# Patient Record
Sex: FEMALE | Race: BLACK OR AFRICAN AMERICAN | NOT HISPANIC OR LATINO | Employment: UNEMPLOYED | URBAN - METROPOLITAN AREA
[De-identification: names, ages, dates, MRNs, and addresses within clinical notes are randomized per-mention and may not be internally consistent; named-entity substitution may affect disease eponyms.]

---

## 2022-04-21 ENCOUNTER — APPOINTMENT (OUTPATIENT)
Dept: ULTRASOUND IMAGING | Facility: CLINIC | Age: 32
End: 2022-04-21

## 2022-04-21 ENCOUNTER — HOSPITAL ENCOUNTER (INPATIENT)
Facility: CLINIC | Age: 32
LOS: 5 days | Discharge: HOME | End: 2022-04-26
Attending: EMERGENCY MEDICINE | Admitting: OBSTETRICS & GYNECOLOGY

## 2022-04-21 DIAGNOSIS — O14.92 PRE-ECLAMPSIA IN SECOND TRIMESTER: Primary | ICD-10-CM

## 2022-04-21 DIAGNOSIS — O36.4XX0 FETAL DEMISE IN SINGLETON PREGNANCY GREATER THAN 22 WEEKS GESTATION, ANTEPARTUM: ICD-10-CM

## 2022-04-21 LAB
ABO/RH(D): NORMAL
ALT SERPL W P-5'-P-CCNC: 13 U/L (ref 0–50)
ANION GAP SERPL CALCULATED.3IONS-SCNC: 11 MMOL/L (ref 3–14)
ANTIBODY SCREEN: NEGATIVE
APTT PPP: 31 SECONDS (ref 22–38)
AST SERPL W P-5'-P-CCNC: 20 U/L (ref 0–45)
BASOPHILS # BLD AUTO: 0 10E3/UL (ref 0–0.2)
BASOPHILS NFR BLD AUTO: 0 %
BUN SERPL-MCNC: 14 MG/DL (ref 7–30)
CA-I BLD-MCNC: 4.8 MG/DL (ref 4.4–5.2)
CALCIUM SERPL-MCNC: 8.2 MG/DL (ref 8.5–10.1)
CHLORIDE BLD-SCNC: 110 MMOL/L (ref 94–109)
CO2 SERPL-SCNC: 17 MMOL/L (ref 20–32)
CPB POCT: NO
CREAT SERPL-MCNC: 0.89 MG/DL (ref 0.52–1.04)
EOSINOPHIL # BLD AUTO: 0 10E3/UL (ref 0–0.7)
EOSINOPHIL NFR BLD AUTO: 0 %
ERYTHROCYTE [DISTWIDTH] IN BLOOD BY AUTOMATED COUNT: 15.9 % (ref 10–15)
GFR SERPL CREATININE-BSD FRML MDRD: 89 ML/MIN/1.73M2
GLUCOSE BLD-MCNC: 110 MG/DL (ref 70–99)
GLUCOSE BLD-MCNC: 141 MG/DL (ref 70–99)
HBA1C MFR BLD: 4.7 % (ref 0–5.6)
HCO3 BLDV-SCNC: 18 MMOL/L (ref 21–28)
HCT VFR BLD AUTO: 30.1 % (ref 35–47)
HCT VFR BLD CALC: 30 % (ref 35–47)
HGB BLD-MCNC: 10.2 G/DL (ref 11.7–15.7)
HGB BLD-MCNC: 9.7 G/DL (ref 11.7–15.7)
HOLD SPECIMEN: NORMAL
IMM GRANULOCYTES # BLD: 0.1 10E3/UL
IMM GRANULOCYTES NFR BLD: 1 %
INR PPP: 1.15 (ref 0.85–1.15)
LYMPHOCYTES # BLD AUTO: 1.8 10E3/UL (ref 0.8–5.3)
LYMPHOCYTES NFR BLD AUTO: 8 %
MCH RBC QN AUTO: 28.9 PG (ref 26.5–33)
MCHC RBC AUTO-ENTMCNC: 32.2 G/DL (ref 31.5–36.5)
MCV RBC AUTO: 90 FL (ref 78–100)
MONOCYTES # BLD AUTO: 0.7 10E3/UL (ref 0–1.3)
MONOCYTES NFR BLD AUTO: 3 %
NEUTROPHILS # BLD AUTO: 19.8 10E3/UL (ref 1.6–8.3)
NEUTROPHILS NFR BLD AUTO: 88 %
NRBC # BLD AUTO: 0 10E3/UL
NRBC BLD AUTO-RTO: 0 /100
PCO2 BLDV: 35 MM HG (ref 40–50)
PH BLDV: 7.33 [PH] (ref 7.32–7.43)
PLAT MORPH BLD: NORMAL
PLATELET # BLD AUTO: 171 10E3/UL (ref 150–450)
PO2 BLDV: 25 MM HG (ref 25–47)
POTASSIUM BLD-SCNC: 3.5 MMOL/L (ref 3.4–5.3)
POTASSIUM BLD-SCNC: 3.6 MMOL/L (ref 3.4–5.3)
RBC # BLD AUTO: 3.36 10E6/UL (ref 3.8–5.2)
RBC MORPH BLD: NORMAL
SAO2 % BLDV: 43 % (ref 94–100)
SARS-COV-2 RNA RESP QL NAA+PROBE: NEGATIVE
SODIUM BLD-SCNC: 139 MMOL/L (ref 133–144)
SODIUM SERPL-SCNC: 138 MMOL/L (ref 133–144)
SPECIMEN EXPIRATION DATE: NORMAL
TSH SERPL DL<=0.005 MIU/L-ACNC: 1.94 MU/L (ref 0.4–4)
WBC # BLD AUTO: 22.4 10E3/UL (ref 4–11)

## 2022-04-21 PROCEDURE — 86803 HEPATITIS C AB TEST: CPT | Performed by: STUDENT IN AN ORGANIZED HEALTH CARE EDUCATION/TRAINING PROGRAM

## 2022-04-21 PROCEDURE — 86787 VARICELLA-ZOSTER ANTIBODY: CPT | Performed by: STUDENT IN AN ORGANIZED HEALTH CARE EDUCATION/TRAINING PROGRAM

## 2022-04-21 PROCEDURE — 3E0P7VZ INTRODUCTION OF HORMONE INTO FEMALE REPRODUCTIVE, VIA NATURAL OR ARTIFICIAL OPENING: ICD-10-PCS | Performed by: OBSTETRICS & GYNECOLOGY

## 2022-04-21 PROCEDURE — U0005 INFEC AGEN DETEC AMPLI PROBE: HCPCS | Performed by: OBSTETRICS & GYNECOLOGY

## 2022-04-21 PROCEDURE — 99285 EMERGENCY DEPT VISIT HI MDM: CPT | Mod: 25

## 2022-04-21 PROCEDURE — 85460 HEMOGLOBIN FETAL: CPT | Performed by: STUDENT IN AN ORGANIZED HEALTH CARE EDUCATION/TRAINING PROGRAM

## 2022-04-21 PROCEDURE — 86923 COMPATIBILITY TEST ELECTRIC: CPT | Performed by: OBSTETRICS & GYNECOLOGY

## 2022-04-21 PROCEDURE — 120N000002 HC R&B MED SURG/OB UMMC

## 2022-04-21 PROCEDURE — 87340 HEPATITIS B SURFACE AG IA: CPT | Performed by: STUDENT IN AN ORGANIZED HEALTH CARE EDUCATION/TRAINING PROGRAM

## 2022-04-21 PROCEDURE — 82947 ASSAY GLUCOSE BLOOD QUANT: CPT

## 2022-04-21 PROCEDURE — 99285 EMERGENCY DEPT VISIT HI MDM: CPT | Performed by: EMERGENCY MEDICINE

## 2022-04-21 PROCEDURE — 250N000011 HC RX IP 250 OP 636: Performed by: OBSTETRICS & GYNECOLOGY

## 2022-04-21 PROCEDURE — 84450 TRANSFERASE (AST) (SGOT): CPT | Performed by: OBSTETRICS & GYNECOLOGY

## 2022-04-21 PROCEDURE — 999N000127 HC STATISTIC PERIPHERAL IV START W US GUIDANCE

## 2022-04-21 PROCEDURE — 258N000003 HC RX IP 258 OP 636: Performed by: EMERGENCY MEDICINE

## 2022-04-21 PROCEDURE — 250N000011 HC RX IP 250 OP 636: Performed by: STUDENT IN AN ORGANIZED HEALTH CARE EDUCATION/TRAINING PROGRAM

## 2022-04-21 PROCEDURE — 86923 COMPATIBILITY TEST ELECTRIC: CPT | Performed by: NURSE PRACTITIONER

## 2022-04-21 PROCEDURE — 86850 RBC ANTIBODY SCREEN: CPT | Performed by: EMERGENCY MEDICINE

## 2022-04-21 PROCEDURE — 36415 COLL VENOUS BLD VENIPUNCTURE: CPT | Performed by: OBSTETRICS & GYNECOLOGY

## 2022-04-21 PROCEDURE — 86762 RUBELLA ANTIBODY: CPT | Performed by: STUDENT IN AN ORGANIZED HEALTH CARE EDUCATION/TRAINING PROGRAM

## 2022-04-21 PROCEDURE — 85384 FIBRINOGEN ACTIVITY: CPT | Performed by: STUDENT IN AN ORGANIZED HEALTH CARE EDUCATION/TRAINING PROGRAM

## 2022-04-21 PROCEDURE — 87389 HIV-1 AG W/HIV-1&-2 AB AG IA: CPT | Performed by: STUDENT IN AN ORGANIZED HEALTH CARE EDUCATION/TRAINING PROGRAM

## 2022-04-21 PROCEDURE — 76805 OB US >/= 14 WKS SNGL FETUS: CPT

## 2022-04-21 PROCEDURE — 85025 COMPLETE CBC W/AUTO DIFF WBC: CPT | Performed by: EMERGENCY MEDICINE

## 2022-04-21 PROCEDURE — 86780 TREPONEMA PALLIDUM: CPT | Performed by: STUDENT IN AN ORGANIZED HEALTH CARE EDUCATION/TRAINING PROGRAM

## 2022-04-21 PROCEDURE — 250N000013 HC RX MED GY IP 250 OP 250 PS 637: Performed by: STUDENT IN AN ORGANIZED HEALTH CARE EDUCATION/TRAINING PROGRAM

## 2022-04-21 PROCEDURE — 84460 ALANINE AMINO (ALT) (SGPT): CPT | Performed by: OBSTETRICS & GYNECOLOGY

## 2022-04-21 PROCEDURE — 82310 ASSAY OF CALCIUM: CPT | Performed by: EMERGENCY MEDICINE

## 2022-04-21 PROCEDURE — 85730 THROMBOPLASTIN TIME PARTIAL: CPT | Performed by: EMERGENCY MEDICINE

## 2022-04-21 PROCEDURE — 84443 ASSAY THYROID STIM HORMONE: CPT | Performed by: OBSTETRICS & GYNECOLOGY

## 2022-04-21 PROCEDURE — 82330 ASSAY OF CALCIUM: CPT

## 2022-04-21 PROCEDURE — 76805 OB US >/= 14 WKS SNGL FETUS: CPT | Mod: 26 | Performed by: RADIOLOGY

## 2022-04-21 PROCEDURE — 85610 PROTHROMBIN TIME: CPT | Performed by: EMERGENCY MEDICINE

## 2022-04-21 PROCEDURE — 83036 HEMOGLOBIN GLYCOSYLATED A1C: CPT | Performed by: STUDENT IN AN ORGANIZED HEALTH CARE EDUCATION/TRAINING PROGRAM

## 2022-04-21 PROCEDURE — 99221 1ST HOSP IP/OBS SF/LOW 40: CPT | Mod: GC | Performed by: OBSTETRICS & GYNECOLOGY

## 2022-04-21 RX ORDER — DIPHENOXYLATE HCL/ATROPINE 2.5-.025MG
2 TABLET ORAL EVERY 6 HOURS PRN
Status: DISCONTINUED | OUTPATIENT
Start: 2022-04-21 | End: 2022-04-22 | Stop reason: HOSPADM

## 2022-04-21 RX ORDER — FENTANYL CITRATE-0.9 % NACL/PF 10 MCG/ML
100 PLASTIC BAG, INJECTION (ML) INTRAVENOUS EVERY 5 MIN PRN
Status: DISCONTINUED | OUTPATIENT
Start: 2022-04-21 | End: 2022-04-22 | Stop reason: HOSPADM

## 2022-04-21 RX ORDER — FENTANYL CITRATE 50 UG/ML
50-100 INJECTION, SOLUTION INTRAMUSCULAR; INTRAVENOUS
Status: DISCONTINUED | OUTPATIENT
Start: 2022-04-21 | End: 2022-04-23

## 2022-04-21 RX ORDER — NALBUPHINE HYDROCHLORIDE 10 MG/ML
2.5-5 INJECTION, SOLUTION INTRAMUSCULAR; INTRAVENOUS; SUBCUTANEOUS EVERY 6 HOURS PRN
Status: DISCONTINUED | OUTPATIENT
Start: 2022-04-21 | End: 2022-04-23

## 2022-04-21 RX ORDER — DIPHENOXYLATE HCL/ATROPINE 2.5-.025MG
2 TABLET ORAL ONCE
Status: COMPLETED | OUTPATIENT
Start: 2022-04-21 | End: 2022-04-21

## 2022-04-21 RX ORDER — NALOXONE HYDROCHLORIDE 0.4 MG/ML
0.2 INJECTION, SOLUTION INTRAMUSCULAR; INTRAVENOUS; SUBCUTANEOUS
Status: DISCONTINUED | OUTPATIENT
Start: 2022-04-21 | End: 2022-04-23

## 2022-04-21 RX ORDER — MAGNESIUM HYDROXIDE/ALUMINUM HYDROXICE/SIMETHICONE 120; 1200; 1200 MG/30ML; MG/30ML; MG/30ML
30 SUSPENSION ORAL EVERY 4 HOURS PRN
Status: DISCONTINUED | OUTPATIENT
Start: 2022-04-21 | End: 2022-04-22 | Stop reason: HOSPADM

## 2022-04-21 RX ORDER — SODIUM CHLORIDE, SODIUM LACTATE, POTASSIUM CHLORIDE, CALCIUM CHLORIDE 600; 310; 30; 20 MG/100ML; MG/100ML; MG/100ML; MG/100ML
INJECTION, SOLUTION INTRAVENOUS CONTINUOUS
Status: DISCONTINUED | OUTPATIENT
Start: 2022-04-21 | End: 2022-04-22

## 2022-04-21 RX ORDER — FENTANYL CITRATE 50 UG/ML
100 INJECTION, SOLUTION INTRAMUSCULAR; INTRAVENOUS
Status: DISCONTINUED | OUTPATIENT
Start: 2022-04-21 | End: 2022-04-21

## 2022-04-21 RX ORDER — ACETAMINOPHEN 325 MG/1
650 TABLET ORAL EVERY 4 HOURS PRN
Status: DISCONTINUED | OUTPATIENT
Start: 2022-04-21 | End: 2022-04-22 | Stop reason: HOSPADM

## 2022-04-21 RX ORDER — NALOXONE HYDROCHLORIDE 0.4 MG/ML
0.4 INJECTION, SOLUTION INTRAMUSCULAR; INTRAVENOUS; SUBCUTANEOUS
Status: DISCONTINUED | OUTPATIENT
Start: 2022-04-21 | End: 2022-04-23

## 2022-04-21 RX ORDER — ONDANSETRON 2 MG/ML
4 INJECTION INTRAMUSCULAR; INTRAVENOUS EVERY 6 HOURS PRN
Status: DISCONTINUED | OUTPATIENT
Start: 2022-04-21 | End: 2022-04-22 | Stop reason: HOSPADM

## 2022-04-21 RX ORDER — MISOPROSTOL 200 UG/1
200 TABLET ORAL EVERY 4 HOURS
Status: DISCONTINUED | OUTPATIENT
Start: 2022-04-21 | End: 2022-04-22 | Stop reason: HOSPADM

## 2022-04-21 RX ORDER — MIFEPRISTONE 200 MG/1
200 TABLET ORAL ONCE
Status: DISCONTINUED | OUTPATIENT
Start: 2022-04-21 | End: 2022-04-21

## 2022-04-21 RX ORDER — OXYCODONE HYDROCHLORIDE 5 MG/1
5 TABLET ORAL EVERY 4 HOURS PRN
Status: DISCONTINUED | OUTPATIENT
Start: 2022-04-21 | End: 2022-04-22 | Stop reason: HOSPADM

## 2022-04-21 RX ORDER — FENTANYL/ROPIVACAINE/NS/PF 2MCG/ML-.1
PLASTIC BAG, INJECTION (ML) EPIDURAL
Status: DISCONTINUED | OUTPATIENT
Start: 2022-04-21 | End: 2022-04-22 | Stop reason: HOSPADM

## 2022-04-21 RX ADMIN — FENTANYL CITRATE 50 MCG: 50 INJECTION INTRAMUSCULAR; INTRAVENOUS at 20:25

## 2022-04-21 RX ADMIN — MISOPROSTOL 200 MCG: 200 TABLET ORAL at 23:14

## 2022-04-21 RX ADMIN — DIPHENOXYLATE HYDROCHLORIDE AND ATROPINE SULFATE 2 TABLET: 2.5; .025 TABLET ORAL at 23:16

## 2022-04-21 RX ADMIN — FENTANYL CITRATE 100 MCG: 50 INJECTION INTRAMUSCULAR; INTRAVENOUS at 23:35

## 2022-04-21 RX ADMIN — SODIUM CHLORIDE, POTASSIUM CHLORIDE, SODIUM LACTATE AND CALCIUM CHLORIDE: 600; 310; 30; 20 INJECTION, SOLUTION INTRAVENOUS at 20:22

## 2022-04-21 RX ADMIN — FENTANYL CITRATE 100 MCG: 50 INJECTION INTRAMUSCULAR; INTRAVENOUS at 21:27

## 2022-04-21 RX ADMIN — FENTANYL CITRATE 100 MCG: 50 INJECTION INTRAMUSCULAR; INTRAVENOUS at 22:31

## 2022-04-21 RX ADMIN — ONDANSETRON 4 MG: 2 INJECTION INTRAMUSCULAR; INTRAVENOUS at 23:22

## 2022-04-21 RX ADMIN — ACETAMINOPHEN 650 MG: 325 TABLET ORAL at 23:17

## 2022-04-21 ASSESSMENT — ENCOUNTER SYMPTOMS: ABDOMINAL PAIN: 1

## 2022-04-21 ASSESSMENT — ACTIVITIES OF DAILY LIVING (ADL)
DOING_ERRANDS_INDEPENDENTLY_DIFFICULTY: NO
WEAR_GLASSES_OR_BLIND: NO
WALKING_OR_CLIMBING_STAIRS_DIFFICULTY: NO
CONCENTRATING,_REMEMBERING_OR_MAKING_DECISIONS_DIFFICULTY: NO
FALL_HISTORY_WITHIN_LAST_SIX_MONTHS: NO
CHANGE_IN_FUNCTIONAL_STATUS_SINCE_ONSET_OF_CURRENT_ILLNESS/INJURY: NO
TOILETING_ISSUES: NO
DIFFICULTY_EATING/SWALLOWING: NO
DRESSING/BATHING_DIFFICULTY: NO

## 2022-04-21 NOTE — LETTER
[facility]  [address]  [place] [address]  [phone]          April 26, 2022    RE:  Malena Thomas                                                                                                                                                       [address] [place] [id number]        To whom it may concern:    Malena Thomas was admitted to the hospital from 4/22-4/26 for an acute severe medical condition requiring critical inpatient care. She will need several weeks for recovery. Please excuse her from work for 6 weeks as needed.      Sincerely,      Louisa Esqueda MD  ObGyn Resident Physician  04/26/22 5:08 PM

## 2022-04-21 NOTE — ED TRIAGE NOTES
Patient arrives to the ED with complaints of vaginal bleeding since 6pm. Patient reports that she is 5 months pregnant, but denies ever receiving prenatal care.

## 2022-04-22 ENCOUNTER — CARE COORDINATION (OUTPATIENT)
Dept: CARE COORDINATION | Facility: CLINIC | Age: 32
End: 2022-04-22

## 2022-04-22 ENCOUNTER — APPOINTMENT (OUTPATIENT)
Dept: CARDIOLOGY | Facility: CLINIC | Age: 32
End: 2022-04-22
Attending: NURSE PRACTITIONER

## 2022-04-22 ENCOUNTER — APPOINTMENT (OUTPATIENT)
Dept: INTERPRETER SERVICES | Facility: CLINIC | Age: 32
End: 2022-04-22

## 2022-04-22 ENCOUNTER — APPOINTMENT (OUTPATIENT)
Dept: GENERAL RADIOLOGY | Facility: CLINIC | Age: 32
End: 2022-04-22

## 2022-04-22 ENCOUNTER — APPOINTMENT (OUTPATIENT)
Dept: ULTRASOUND IMAGING | Facility: CLINIC | Age: 32
End: 2022-04-22
Attending: NURSE PRACTITIONER

## 2022-04-22 LAB
ALBUMIN SERPL-MCNC: 2.5 G/DL (ref 3.4–5)
ALBUMIN UR-MCNC: 300 MG/DL
ALP SERPL-CCNC: 97 U/L (ref 40–150)
ALT SERPL W P-5'-P-CCNC: 11 U/L (ref 0–50)
ALT SERPL W P-5'-P-CCNC: 17 U/L (ref 0–50)
ALT SERPL W P-5'-P-CCNC: 20 U/L (ref 0–50)
ALT SERPL W P-5'-P-CCNC: 8 U/L (ref 0–50)
AMORPH CRY #/AREA URNS HPF: ABNORMAL /HPF
ANION GAP SERPL CALCULATED.3IONS-SCNC: 13 MMOL/L (ref 3–14)
APPEARANCE UR: CLEAR
APTT PPP: 30 SECONDS (ref 22–38)
APTT PPP: 32 SECONDS (ref 22–38)
APTT PPP: 34 SECONDS (ref 22–38)
APTT PPP: 39 SECONDS (ref 22–38)
APTT PPP: 39 SECONDS (ref 22–38)
AST SERPL W P-5'-P-CCNC: 44 U/L (ref 0–45)
AST SERPL W P-5'-P-CCNC: 45 U/L (ref 0–45)
AST SERPL W P-5'-P-CCNC: 45 U/L (ref 0–45)
AST SERPL W P-5'-P-CCNC: 48 U/L (ref 0–45)
AST SERPL W P-5'-P-CCNC: 49 U/L (ref 0–45)
AST SERPL W P-5'-P-CCNC: 57 U/L (ref 0–45)
BACTERIA #/AREA URNS HPF: ABNORMAL /HPF
BASE EXCESS BLDV CALC-SCNC: -3.4 MMOL/L (ref -7.7–1.9)
BASE EXCESS BLDV CALC-SCNC: -4.3 MMOL/L (ref -7.7–1.9)
BILIRUB SERPL-MCNC: 0.6 MG/DL (ref 0.2–1.3)
BILIRUB UR QL STRIP: NEGATIVE
BLD PROD TYP BPU: NORMAL
BLOOD COMPONENT TYPE: NORMAL
BUN SERPL-MCNC: 20 MG/DL (ref 7–30)
CA-I BLD-MCNC: 4 MG/DL (ref 4.4–5.2)
CALCIUM SERPL-MCNC: 7.8 MG/DL (ref 8.5–10.1)
CF REDUC 60M P MA LENFR BLD TEG: 0 % (ref 0–15)
CF REDUC 60M P MA LENFR BLD TEG: 0 % (ref 0–15)
CFT BLD TEG: 1.3 MINUTE (ref 1–3)
CFT BLD TEG: 4.8 MINUTE (ref 1–3)
CHLORIDE BLD-SCNC: 109 MMOL/L (ref 94–109)
CI (COAGULATION INDEX)(Z) NON NATIVE: -3.5 (ref -3–3)
CI (COAGULATION INDEX)(Z) NON NATIVE: 2.1 (ref -3–3)
CLOT ANGLE BLD TEG: 47.5 DEGREES (ref 53–72)
CLOT ANGLE BLD TEG: 72.2 DEGREES (ref 53–72)
CLOT INIT BLD TEG: 4 MINUTE (ref 5–10)
CLOT INIT BLD TEG: 5.1 MINUTE (ref 5–10)
CLOT LYSIS 30M P MA LENFR BLD TEG: 0 % (ref 0–8)
CLOT LYSIS 30M P MA LENFR BLD TEG: 0 % (ref 0–8)
CLOT STRENGTH BLD TEG: 4.5 KD/SC (ref 4.5–11)
CLOT STRENGTH BLD TEG: 7.9 KD/SC (ref 4.5–11)
CO2 SERPL-SCNC: 15 MMOL/L (ref 20–32)
CODING SYSTEM: NORMAL
COLOR UR AUTO: ABNORMAL
CREAT SERPL-MCNC: 1.42 MG/DL (ref 0.52–1.04)
CREAT SERPL-MCNC: 1.51 MG/DL (ref 0.52–1.04)
CREAT SERPL-MCNC: 1.59 MG/DL (ref 0.52–1.04)
CREAT SERPL-MCNC: 1.61 MG/DL (ref 0.52–1.04)
CREAT SERPL-MCNC: 1.61 MG/DL (ref 0.52–1.04)
CREAT SERPL-MCNC: 1.66 MG/DL (ref 0.52–1.04)
CROSSMATCH: NORMAL
CROSSMATCH: NORMAL
CRP SERPL-MCNC: 12 MG/L (ref 0–8)
ERYTHROCYTE [DISTWIDTH] IN BLOOD BY AUTOMATED COUNT: 15.2 % (ref 10–15)
ERYTHROCYTE [DISTWIDTH] IN BLOOD BY AUTOMATED COUNT: 15.3 % (ref 10–15)
ERYTHROCYTE [DISTWIDTH] IN BLOOD BY AUTOMATED COUNT: 15.4 % (ref 10–15)
ERYTHROCYTE [DISTWIDTH] IN BLOOD BY AUTOMATED COUNT: 15.7 % (ref 10–15)
ERYTHROCYTE [DISTWIDTH] IN BLOOD BY AUTOMATED COUNT: 15.7 % (ref 10–15)
ERYTHROCYTE [DISTWIDTH] IN BLOOD BY AUTOMATED COUNT: 16.1 % (ref 10–15)
FETAL RBC % LFV: 0 %
FETAL RBC (ML): 0 ML
FIBRINOGEN PPP-MCNC: 125 MG/DL (ref 170–490)
FIBRINOGEN PPP-MCNC: 148 MG/DL (ref 170–490)
FIBRINOGEN PPP-MCNC: 201 MG/DL (ref 170–490)
FIBRINOGEN PPP-MCNC: 230 MG/DL (ref 170–490)
FIBRINOGEN PPP-MCNC: 231 MG/DL (ref 170–490)
FIBRINOGEN PPP-MCNC: 279 MG/DL (ref 170–490)
GFR SERPL CREATININE-BSD FRML MDRD: 42 ML/MIN/1.73M2
GFR SERPL CREATININE-BSD FRML MDRD: 44 ML/MIN/1.73M2
GFR SERPL CREATININE-BSD FRML MDRD: 47 ML/MIN/1.73M2
GFR SERPL CREATININE-BSD FRML MDRD: 51 ML/MIN/1.73M2
GLUCOSE BLD-MCNC: 79 MG/DL (ref 70–99)
GLUCOSE BLDC GLUCOMTR-MCNC: 116 MG/DL (ref 70–99)
GLUCOSE BLDC GLUCOMTR-MCNC: 85 MG/DL (ref 70–99)
GLUCOSE BLDC GLUCOMTR-MCNC: 89 MG/DL (ref 70–99)
GLUCOSE BLDC GLUCOMTR-MCNC: 95 MG/DL (ref 70–99)
GLUCOSE UR STRIP-MCNC: NEGATIVE MG/DL
HBV SURFACE AG SERPL QL IA: NONREACTIVE
HCO3 BLDV-SCNC: 20 MMOL/L (ref 21–28)
HCO3 BLDV-SCNC: 21 MMOL/L (ref 21–28)
HCT VFR BLD AUTO: 19.9 % (ref 35–47)
HCT VFR BLD AUTO: 20.2 % (ref 35–47)
HCT VFR BLD AUTO: 21.9 % (ref 35–47)
HCT VFR BLD AUTO: 23.3 % (ref 35–47)
HCT VFR BLD AUTO: 25 % (ref 35–47)
HCT VFR BLD AUTO: 25.9 % (ref 35–47)
HCV AB SERPL QL IA: NONREACTIVE
HGB BLD-MCNC: 6.7 G/DL (ref 11.7–15.7)
HGB BLD-MCNC: 6.9 G/DL (ref 11.7–15.7)
HGB BLD-MCNC: 7.1 G/DL (ref 11.7–15.7)
HGB BLD-MCNC: 7.5 G/DL (ref 11.7–15.7)
HGB BLD-MCNC: 8.1 G/DL (ref 11.7–15.7)
HGB BLD-MCNC: 8.3 G/DL (ref 11.7–15.7)
HGB UR QL STRIP: ABNORMAL
HIV 1+2 AB+HIV1 P24 AG SERPL QL IA: NONREACTIVE
IF INDICATED RECOMMENDED DOSE OF RH IMMUNE GLOBULIN UG: 300 UG
INR PPP: 1.19 (ref 0.86–1.14)
INR PPP: 1.22 (ref 0.85–1.15)
INR PPP: 1.34 (ref 0.85–1.15)
INR PPP: 1.55 (ref 0.86–1.14)
INR PPP: 1.56 (ref 0.86–1.14)
ISSUE DATE AND TIME: NORMAL
KETONES UR STRIP-MCNC: NEGATIVE MG/DL
LACTATE SERPL-SCNC: 1.4 MMOL/L (ref 0.7–2)
LEUKOCYTE ESTERASE UR QL STRIP: NEGATIVE
LVEF ECHO: NORMAL
MAGNESIUM SERPL-MCNC: 3.8 MG/DL (ref 1.6–2.3)
MAGNESIUM SERPL-MCNC: 4 MG/DL (ref 1.6–2.3)
MCF BLD TEG: 47.2 MM (ref 50–70)
MCF BLD TEG: 61.3 MM (ref 50–70)
MCH RBC QN AUTO: 28.5 PG (ref 26.5–33)
MCH RBC QN AUTO: 28.7 PG (ref 26.5–33)
MCH RBC QN AUTO: 28.9 PG (ref 26.5–33)
MCH RBC QN AUTO: 29 PG (ref 26.5–33)
MCH RBC QN AUTO: 29.2 PG (ref 26.5–33)
MCH RBC QN AUTO: 29.2 PG (ref 26.5–33)
MCHC RBC AUTO-ENTMCNC: 31.3 G/DL (ref 31.5–36.5)
MCHC RBC AUTO-ENTMCNC: 32.2 G/DL (ref 31.5–36.5)
MCHC RBC AUTO-ENTMCNC: 32.4 G/DL (ref 31.5–36.5)
MCHC RBC AUTO-ENTMCNC: 33.2 G/DL (ref 31.5–36.5)
MCHC RBC AUTO-ENTMCNC: 33.7 G/DL (ref 31.5–36.5)
MCHC RBC AUTO-ENTMCNC: 34.2 G/DL (ref 31.5–36.5)
MCV RBC AUTO: 86 FL (ref 78–100)
MCV RBC AUTO: 86 FL (ref 78–100)
MCV RBC AUTO: 87 FL (ref 78–100)
MCV RBC AUTO: 89 FL (ref 78–100)
MCV RBC AUTO: 90 FL (ref 78–100)
MCV RBC AUTO: 91 FL (ref 78–100)
MRSA DNA SPEC QL NAA+PROBE: NEGATIVE
MUCOUS THREADS #/AREA URNS LPF: PRESENT /LPF
NITRATE UR QL: NEGATIVE
NT-PROBNP SERPL-MCNC: 585 PG/ML (ref 0–450)
O2/TOTAL GAS SETTING VFR VENT: 0 %
O2/TOTAL GAS SETTING VFR VENT: 0 %
OSMOLALITY UR: 228 MMOL/KG (ref 100–1200)
OXYHGB MFR BLDV: 75 % (ref 70–75)
OXYHGB MFR BLDV: 78 % (ref 70–75)
PCO2 BLDV: 32 MM HG (ref 40–50)
PCO2 BLDV: 33 MM HG (ref 40–50)
PH BLDV: 7.39 [PH] (ref 7.32–7.43)
PH BLDV: 7.42 [PH] (ref 7.32–7.43)
PH UR STRIP: 6 [PH] (ref 5–7)
PLATELET # BLD AUTO: 60 10E3/UL (ref 150–450)
PLATELET # BLD AUTO: 60 10E3/UL (ref 150–450)
PLATELET # BLD AUTO: 72 10E3/UL (ref 150–450)
PLATELET # BLD AUTO: 88 10E3/UL (ref 150–450)
PLATELET # BLD AUTO: 93 10E3/UL (ref 150–450)
PLATELET # BLD AUTO: 95 10E3/UL (ref 150–450)
PO2 BLDV: 42 MM HG (ref 25–47)
PO2 BLDV: 44 MM HG (ref 25–47)
POTASSIUM BLD-SCNC: 4.5 MMOL/L (ref 3.4–5.3)
PROCALCITONIN SERPL-MCNC: 1.04 NG/ML
PROT SERPL-MCNC: 6.3 G/DL (ref 6.8–8.8)
RBC # BLD AUTO: 2.31 10E6/UL (ref 3.8–5.2)
RBC # BLD AUTO: 2.36 10E6/UL (ref 3.8–5.2)
RBC # BLD AUTO: 2.43 10E6/UL (ref 3.8–5.2)
RBC # BLD AUTO: 2.61 10E6/UL (ref 3.8–5.2)
RBC # BLD AUTO: 2.84 10E6/UL (ref 3.8–5.2)
RBC # BLD AUTO: 2.87 10E6/UL (ref 3.8–5.2)
RBC URINE: 8 /HPF
RUBV IGG SERPL QL IA: 3.16 INDEX
RUBV IGG SERPL QL IA: POSITIVE
SA TARGET DNA: NEGATIVE
SODIUM SERPL-SCNC: 137 MMOL/L (ref 133–144)
SODIUM UR-SCNC: 62 MMOL/L
SP GR UR STRIP: 1.01 (ref 1–1.03)
SQUAMOUS EPITHELIAL: 2 /HPF
T PALLIDUM AB SER QL: NONREACTIVE
TROPONIN I SERPL HS-MCNC: 32 NG/L
UNIT ABO/RH: NORMAL
UNIT NUMBER: NORMAL
UNIT STATUS: NORMAL
UNIT TYPE ISBT: 2800
UNIT TYPE ISBT: 5100
UNIT TYPE ISBT: 600
UROBILINOGEN UR STRIP-MCNC: NORMAL MG/DL
VZV IGG SER QL IA: 20.5 INDEX
VZV IGG SER QL IA: NORMAL
WBC # BLD AUTO: 16.2 10E3/UL (ref 4–11)
WBC # BLD AUTO: 20.2 10E3/UL (ref 4–11)
WBC # BLD AUTO: 21.8 10E3/UL (ref 4–11)
WBC # BLD AUTO: 22.8 10E3/UL (ref 4–11)
WBC # BLD AUTO: 23 10E3/UL (ref 4–11)
WBC # BLD AUTO: 23.3 10E3/UL (ref 4–11)
WBC URINE: 6 /HPF

## 2022-04-22 PROCEDURE — P9037 PLATE PHERES LEUKOREDU IRRAD: HCPCS | Performed by: OBSTETRICS & GYNECOLOGY

## 2022-04-22 PROCEDURE — 83935 ASSAY OF URINE OSMOLALITY: CPT | Performed by: NURSE PRACTITIONER

## 2022-04-22 PROCEDURE — 85384 FIBRINOGEN ACTIVITY: CPT | Performed by: OBSTETRICS & GYNECOLOGY

## 2022-04-22 PROCEDURE — 82805 BLOOD GASES W/O2 SATURATION: CPT | Performed by: NURSE PRACTITIONER

## 2022-04-22 PROCEDURE — P9059 PLASMA, FRZ BETWEEN 8-24HOUR: HCPCS | Performed by: STUDENT IN AN ORGANIZED HEALTH CARE EDUCATION/TRAINING PROGRAM

## 2022-04-22 PROCEDURE — 71045 X-RAY EXAM CHEST 1 VIEW: CPT | Mod: 26 | Performed by: RADIOLOGY

## 2022-04-22 PROCEDURE — 88305 TISSUE EXAM BY PATHOLOGIST: CPT | Mod: TC | Performed by: STUDENT IN AN ORGANIZED HEALTH CARE EDUCATION/TRAINING PROGRAM

## 2022-04-22 PROCEDURE — 200N000002 HC R&B ICU UMMC

## 2022-04-22 PROCEDURE — 250N000011 HC RX IP 250 OP 636

## 2022-04-22 PROCEDURE — 722N000001 HC LABOR CARE VAGINAL DELIVERY SINGLE

## 2022-04-22 PROCEDURE — 83735 ASSAY OF MAGNESIUM: CPT | Performed by: OBSTETRICS & GYNECOLOGY

## 2022-04-22 PROCEDURE — 36415 COLL VENOUS BLD VENIPUNCTURE: CPT | Performed by: NURSE PRACTITIONER

## 2022-04-22 PROCEDURE — 85730 THROMBOPLASTIN TIME PARTIAL: CPT | Performed by: STUDENT IN AN ORGANIZED HEALTH CARE EDUCATION/TRAINING PROGRAM

## 2022-04-22 PROCEDURE — P9016 RBC LEUKOCYTES REDUCED: HCPCS | Performed by: NURSE PRACTITIONER

## 2022-04-22 PROCEDURE — 84460 ALANINE AMINO (ALT) (SGPT): CPT | Performed by: OBSTETRICS & GYNECOLOGY

## 2022-04-22 PROCEDURE — 36415 COLL VENOUS BLD VENIPUNCTURE: CPT | Performed by: STUDENT IN AN ORGANIZED HEALTH CARE EDUCATION/TRAINING PROGRAM

## 2022-04-22 PROCEDURE — 87070 CULTURE OTHR SPECIMN AEROBIC: CPT | Performed by: STUDENT IN AN ORGANIZED HEALTH CARE EDUCATION/TRAINING PROGRAM

## 2022-04-22 PROCEDURE — 71045 X-RAY EXAM CHEST 1 VIEW: CPT

## 2022-04-22 PROCEDURE — 85396 CLOTTING ASSAY WHOLE BLOOD: CPT | Performed by: OBSTETRICS & GYNECOLOGY

## 2022-04-22 PROCEDURE — 93306 TTE W/DOPPLER COMPLETE: CPT

## 2022-04-22 PROCEDURE — 93306 TTE W/DOPPLER COMPLETE: CPT | Mod: 26 | Performed by: STUDENT IN AN ORGANIZED HEALTH CARE EDUCATION/TRAINING PROGRAM

## 2022-04-22 PROCEDURE — 85730 THROMBOPLASTIN TIME PARTIAL: CPT | Performed by: OBSTETRICS & GYNECOLOGY

## 2022-04-22 PROCEDURE — 250N000013 HC RX MED GY IP 250 OP 250 PS 637: Performed by: NURSE PRACTITIONER

## 2022-04-22 PROCEDURE — 258N000003 HC RX IP 258 OP 636: Performed by: STUDENT IN AN ORGANIZED HEALTH CARE EDUCATION/TRAINING PROGRAM

## 2022-04-22 PROCEDURE — 85384 FIBRINOGEN ACTIVITY: CPT | Performed by: STUDENT IN AN ORGANIZED HEALTH CARE EDUCATION/TRAINING PROGRAM

## 2022-04-22 PROCEDURE — 82330 ASSAY OF CALCIUM: CPT | Performed by: STUDENT IN AN ORGANIZED HEALTH CARE EDUCATION/TRAINING PROGRAM

## 2022-04-22 PROCEDURE — 84155 ASSAY OF PROTEIN SERUM: CPT | Performed by: STUDENT IN AN ORGANIZED HEALTH CARE EDUCATION/TRAINING PROGRAM

## 2022-04-22 PROCEDURE — 250N000013 HC RX MED GY IP 250 OP 250 PS 637: Performed by: STUDENT IN AN ORGANIZED HEALTH CARE EDUCATION/TRAINING PROGRAM

## 2022-04-22 PROCEDURE — 85610 PROTHROMBIN TIME: CPT | Performed by: OBSTETRICS & GYNECOLOGY

## 2022-04-22 PROCEDURE — 84460 ALANINE AMINO (ALT) (SGPT): CPT | Performed by: STUDENT IN AN ORGANIZED HEALTH CARE EDUCATION/TRAINING PROGRAM

## 2022-04-22 PROCEDURE — 85396 CLOTTING ASSAY WHOLE BLOOD: CPT | Performed by: STUDENT IN AN ORGANIZED HEALTH CARE EDUCATION/TRAINING PROGRAM

## 2022-04-22 PROCEDURE — 87641 MR-STAPH DNA AMP PROBE: CPT | Performed by: NURSE PRACTITIONER

## 2022-04-22 PROCEDURE — 87040 BLOOD CULTURE FOR BACTERIA: CPT | Performed by: OBSTETRICS & GYNECOLOGY

## 2022-04-22 PROCEDURE — 83735 ASSAY OF MAGNESIUM: CPT | Performed by: STUDENT IN AN ORGANIZED HEALTH CARE EDUCATION/TRAINING PROGRAM

## 2022-04-22 PROCEDURE — 84145 PROCALCITONIN (PCT): CPT | Performed by: NURSE PRACTITIONER

## 2022-04-22 PROCEDURE — 250N000011 HC RX IP 250 OP 636: Performed by: OBSTETRICS & GYNECOLOGY

## 2022-04-22 PROCEDURE — P9012 CRYOPRECIPITATE EACH UNIT: HCPCS | Performed by: STUDENT IN AN ORGANIZED HEALTH CARE EDUCATION/TRAINING PROGRAM

## 2022-04-22 PROCEDURE — 84450 TRANSFERASE (AST) (SGOT): CPT | Performed by: STUDENT IN AN ORGANIZED HEALTH CARE EDUCATION/TRAINING PROGRAM

## 2022-04-22 PROCEDURE — 93970 EXTREMITY STUDY: CPT | Mod: 26 | Performed by: RADIOLOGY

## 2022-04-22 PROCEDURE — 85610 PROTHROMBIN TIME: CPT | Performed by: STUDENT IN AN ORGANIZED HEALTH CARE EDUCATION/TRAINING PROGRAM

## 2022-04-22 PROCEDURE — 84300 ASSAY OF URINE SODIUM: CPT | Performed by: NURSE PRACTITIONER

## 2022-04-22 PROCEDURE — 85027 COMPLETE CBC AUTOMATED: CPT | Performed by: STUDENT IN AN ORGANIZED HEALTH CARE EDUCATION/TRAINING PROGRAM

## 2022-04-22 PROCEDURE — 93010 ELECTROCARDIOGRAM REPORT: CPT | Performed by: INTERNAL MEDICINE

## 2022-04-22 PROCEDURE — 59409 OBSTETRICAL CARE: CPT | Mod: GC | Performed by: OBSTETRICS & GYNECOLOGY

## 2022-04-22 PROCEDURE — 250N000013 HC RX MED GY IP 250 OP 250 PS 637

## 2022-04-22 PROCEDURE — 84450 TRANSFERASE (AST) (SGOT): CPT | Performed by: OBSTETRICS & GYNECOLOGY

## 2022-04-22 PROCEDURE — P9059 PLASMA, FRZ BETWEEN 8-24HOUR: HCPCS | Performed by: OBSTETRICS & GYNECOLOGY

## 2022-04-22 PROCEDURE — 80053 COMPREHEN METABOLIC PANEL: CPT | Performed by: STUDENT IN AN ORGANIZED HEALTH CARE EDUCATION/TRAINING PROGRAM

## 2022-04-22 PROCEDURE — 85027 COMPLETE CBC AUTOMATED: CPT | Performed by: NURSE PRACTITIONER

## 2022-04-22 PROCEDURE — 10907ZC DRAINAGE OF AMNIOTIC FLUID, THERAPEUTIC FROM PRODUCTS OF CONCEPTION, VIA NATURAL OR ARTIFICIAL OPENING: ICD-10-PCS | Performed by: OBSTETRICS & GYNECOLOGY

## 2022-04-22 PROCEDURE — 250N000011 HC RX IP 250 OP 636: Performed by: STUDENT IN AN ORGANIZED HEALTH CARE EDUCATION/TRAINING PROGRAM

## 2022-04-22 PROCEDURE — 83880 ASSAY OF NATRIURETIC PEPTIDE: CPT | Performed by: STUDENT IN AN ORGANIZED HEALTH CARE EDUCATION/TRAINING PROGRAM

## 2022-04-22 PROCEDURE — 83605 ASSAY OF LACTIC ACID: CPT | Performed by: NURSE PRACTITIONER

## 2022-04-22 PROCEDURE — 88305 TISSUE EXAM BY PATHOLOGIST: CPT | Mod: 26 | Performed by: PATHOLOGY

## 2022-04-22 PROCEDURE — 84484 ASSAY OF TROPONIN QUANT: CPT | Performed by: STUDENT IN AN ORGANIZED HEALTH CARE EDUCATION/TRAINING PROGRAM

## 2022-04-22 PROCEDURE — 250N000009 HC RX 250: Performed by: NURSE PRACTITIONER

## 2022-04-22 PROCEDURE — 36415 COLL VENOUS BLD VENIPUNCTURE: CPT | Performed by: OBSTETRICS & GYNECOLOGY

## 2022-04-22 PROCEDURE — 85027 COMPLETE CBC AUTOMATED: CPT | Performed by: OBSTETRICS & GYNECOLOGY

## 2022-04-22 PROCEDURE — 250N000009 HC RX 250

## 2022-04-22 PROCEDURE — 81001 URINALYSIS AUTO W/SCOPE: CPT | Performed by: STUDENT IN AN ORGANIZED HEALTH CARE EDUCATION/TRAINING PROGRAM

## 2022-04-22 PROCEDURE — 86140 C-REACTIVE PROTEIN: CPT | Performed by: NURSE PRACTITIONER

## 2022-04-22 PROCEDURE — P9016 RBC LEUKOCYTES REDUCED: HCPCS | Performed by: OBSTETRICS & GYNECOLOGY

## 2022-04-22 PROCEDURE — 93970 EXTREMITY STUDY: CPT

## 2022-04-22 PROCEDURE — 82565 ASSAY OF CREATININE: CPT | Performed by: OBSTETRICS & GYNECOLOGY

## 2022-04-22 RX ORDER — SODIUM CHLORIDE, SODIUM LACTATE, POTASSIUM CHLORIDE, CALCIUM CHLORIDE 600; 310; 30; 20 MG/100ML; MG/100ML; MG/100ML; MG/100ML
10-125 INJECTION, SOLUTION INTRAVENOUS CONTINUOUS
Status: DISCONTINUED | OUTPATIENT
Start: 2022-04-22 | End: 2022-04-22

## 2022-04-22 RX ORDER — MAGNESIUM SULFATE HEPTAHYDRATE 40 MG/ML
4 INJECTION, SOLUTION INTRAVENOUS ONCE
Status: COMPLETED | OUTPATIENT
Start: 2022-04-22 | End: 2022-04-22

## 2022-04-22 RX ORDER — FENTANYL CITRATE 50 UG/ML
50-100 INJECTION, SOLUTION INTRAMUSCULAR; INTRAVENOUS
Status: CANCELLED | OUTPATIENT
Start: 2022-04-22

## 2022-04-22 RX ORDER — FUROSEMIDE 10 MG/ML
40 INJECTION INTRAMUSCULAR; INTRAVENOUS ONCE
Status: DISCONTINUED | OUTPATIENT
Start: 2022-04-22 | End: 2022-04-22

## 2022-04-22 RX ORDER — ONDANSETRON 4 MG/1
4 TABLET, ORALLY DISINTEGRATING ORAL EVERY 6 HOURS PRN
Status: DISCONTINUED | OUTPATIENT
Start: 2022-04-22 | End: 2022-04-26 | Stop reason: HOSPADM

## 2022-04-22 RX ORDER — ACETAMINOPHEN 325 MG/1
650 TABLET ORAL EVERY 4 HOURS PRN
Status: DISCONTINUED | OUTPATIENT
Start: 2022-04-22 | End: 2022-04-26 | Stop reason: HOSPADM

## 2022-04-22 RX ORDER — BISACODYL 10 MG
10 SUPPOSITORY, RECTAL RECTAL DAILY PRN
Status: DISCONTINUED | OUTPATIENT
Start: 2022-04-22 | End: 2022-04-26 | Stop reason: HOSPADM

## 2022-04-22 RX ORDER — DEXTROSE MONOHYDRATE 25 G/50ML
25-50 INJECTION, SOLUTION INTRAVENOUS
Status: DISCONTINUED | OUTPATIENT
Start: 2022-04-22 | End: 2022-04-23

## 2022-04-22 RX ORDER — METHYLERGONOVINE MALEATE 0.2 MG/ML
200 INJECTION INTRAVENOUS
Status: DISCONTINUED | OUTPATIENT
Start: 2022-04-22 | End: 2022-04-26 | Stop reason: HOSPADM

## 2022-04-22 RX ORDER — NICOTINE POLACRILEX 4 MG
15-30 LOZENGE BUCCAL
Status: DISCONTINUED | OUTPATIENT
Start: 2022-04-22 | End: 2022-04-23

## 2022-04-22 RX ORDER — HYDROCORTISONE 2.5 %
CREAM (GRAM) TOPICAL 3 TIMES DAILY PRN
Status: DISCONTINUED | OUTPATIENT
Start: 2022-04-22 | End: 2022-04-26 | Stop reason: HOSPADM

## 2022-04-22 RX ORDER — OXYTOCIN/0.9 % SODIUM CHLORIDE 30/500 ML
340 PLASTIC BAG, INJECTION (ML) INTRAVENOUS CONTINUOUS PRN
Status: DISCONTINUED | OUTPATIENT
Start: 2022-04-22 | End: 2022-04-26 | Stop reason: HOSPADM

## 2022-04-22 RX ORDER — LABETALOL HYDROCHLORIDE 5 MG/ML
20-80 INJECTION, SOLUTION INTRAVENOUS EVERY 10 MIN PRN
Status: DISCONTINUED | OUTPATIENT
Start: 2022-04-22 | End: 2022-04-23

## 2022-04-22 RX ORDER — MAGNESIUM SULFATE HEPTAHYDRATE 500 MG/ML
10 INJECTION, SOLUTION INTRAMUSCULAR; INTRAVENOUS
Status: DISCONTINUED | OUTPATIENT
Start: 2022-04-22 | End: 2022-04-23

## 2022-04-22 RX ORDER — LABETALOL HYDROCHLORIDE 5 MG/ML
INJECTION, SOLUTION INTRAVENOUS
Status: COMPLETED
Start: 2022-04-22 | End: 2022-04-22

## 2022-04-22 RX ORDER — CARBOPROST TROMETHAMINE 250 UG/ML
250 INJECTION, SOLUTION INTRAMUSCULAR
Status: DISCONTINUED | OUTPATIENT
Start: 2022-04-22 | End: 2022-04-26 | Stop reason: HOSPADM

## 2022-04-22 RX ORDER — DOCUSATE SODIUM 100 MG/1
100 CAPSULE, LIQUID FILLED ORAL DAILY
Status: DISCONTINUED | OUTPATIENT
Start: 2022-04-22 | End: 2022-04-26 | Stop reason: HOSPADM

## 2022-04-22 RX ORDER — POLYETHYLENE GLYCOL 3350 17 G/17G
17 POWDER, FOR SOLUTION ORAL DAILY PRN
Status: DISCONTINUED | OUTPATIENT
Start: 2022-04-22 | End: 2022-04-26 | Stop reason: HOSPADM

## 2022-04-22 RX ORDER — SODIUM CHLORIDE 9 MG/ML
INJECTION, SOLUTION INTRAVENOUS
Status: DISCONTINUED
Start: 2022-04-22 | End: 2022-04-22 | Stop reason: HOSPADM

## 2022-04-22 RX ORDER — MISOPROSTOL 200 UG/1
400 TABLET ORAL
Status: DISCONTINUED | OUTPATIENT
Start: 2022-04-22 | End: 2022-04-26 | Stop reason: HOSPADM

## 2022-04-22 RX ORDER — LIDOCAINE HYDROCHLORIDE 10 MG/ML
INJECTION, SOLUTION EPIDURAL; INFILTRATION; INTRACAUDAL; PERINEURAL
Status: DISCONTINUED
Start: 2022-04-22 | End: 2022-04-22 | Stop reason: WASHOUT

## 2022-04-22 RX ORDER — LEVALBUTEROL INHALATION SOLUTION 1.25 MG/3ML
1.25 SOLUTION RESPIRATORY (INHALATION) EVERY 6 HOURS PRN
Status: DISCONTINUED | OUTPATIENT
Start: 2022-04-22 | End: 2022-04-26 | Stop reason: HOSPADM

## 2022-04-22 RX ORDER — MAGNESIUM SULFATE IN WATER 40 MG/ML
0.5 INJECTION, SOLUTION INTRAVENOUS CONTINUOUS
Status: DISCONTINUED | OUTPATIENT
Start: 2022-04-22 | End: 2022-04-22

## 2022-04-22 RX ORDER — MAGNESIUM SULFATE HEPTAHYDRATE 40 MG/ML
2 INJECTION, SOLUTION INTRAVENOUS
Status: DISCONTINUED | OUTPATIENT
Start: 2022-04-22 | End: 2022-04-23

## 2022-04-22 RX ORDER — OXYTOCIN/0.9 % SODIUM CHLORIDE 30/500 ML
PLASTIC BAG, INJECTION (ML) INTRAVENOUS
Status: COMPLETED
Start: 2022-04-22 | End: 2022-04-22

## 2022-04-22 RX ORDER — TRANEXAMIC ACID 10 MG/ML
1 INJECTION, SOLUTION INTRAVENOUS EVERY 30 MIN PRN
Status: DISCONTINUED | OUTPATIENT
Start: 2022-04-22 | End: 2022-04-26 | Stop reason: HOSPADM

## 2022-04-22 RX ORDER — MISOPROSTOL 200 UG/1
TABLET ORAL
Status: DISCONTINUED
Start: 2022-04-22 | End: 2022-04-22 | Stop reason: HOSPADM

## 2022-04-22 RX ORDER — CALCIUM GLUCONATE 20 MG/ML
2 INJECTION, SOLUTION INTRAVENOUS ONCE
Status: COMPLETED | OUTPATIENT
Start: 2022-04-22 | End: 2022-04-22

## 2022-04-22 RX ORDER — MAGNESIUM SULFATE HEPTAHYDRATE 40 MG/ML
4 INJECTION, SOLUTION INTRAVENOUS
Status: DISCONTINUED | OUTPATIENT
Start: 2022-04-22 | End: 2022-04-23

## 2022-04-22 RX ORDER — CALCIUM GLUCONATE 94 MG/ML
1 INJECTION, SOLUTION INTRAVENOUS
Status: DISCONTINUED | OUTPATIENT
Start: 2022-04-22 | End: 2022-04-23

## 2022-04-22 RX ORDER — HYDRALAZINE HYDROCHLORIDE 20 MG/ML
10 INJECTION INTRAMUSCULAR; INTRAVENOUS
Status: DISCONTINUED | OUTPATIENT
Start: 2022-04-22 | End: 2022-04-23

## 2022-04-22 RX ORDER — SODIUM CHLORIDE, SODIUM LACTATE, POTASSIUM CHLORIDE, CALCIUM CHLORIDE 600; 310; 30; 20 MG/100ML; MG/100ML; MG/100ML; MG/100ML
10-125 INJECTION, SOLUTION INTRAVENOUS CONTINUOUS
Status: CANCELLED | OUTPATIENT
Start: 2022-04-22

## 2022-04-22 RX ORDER — TRANEXAMIC ACID 10 MG/ML
INJECTION, SOLUTION INTRAVENOUS
Status: DISCONTINUED
Start: 2022-04-22 | End: 2022-04-22 | Stop reason: WASHOUT

## 2022-04-22 RX ORDER — MISOPROSTOL 200 UG/1
TABLET ORAL
Status: COMPLETED
Start: 2022-04-22 | End: 2022-04-22

## 2022-04-22 RX ORDER — SODIUM CHLORIDE, SODIUM LACTATE, POTASSIUM CHLORIDE, CALCIUM CHLORIDE 600; 310; 30; 20 MG/100ML; MG/100ML; MG/100ML; MG/100ML
INJECTION, SOLUTION INTRAVENOUS CONTINUOUS
Status: CANCELLED | OUTPATIENT
Start: 2022-04-22

## 2022-04-22 RX ORDER — LORAZEPAM 2 MG/ML
2 INJECTION INTRAMUSCULAR
Status: DISCONTINUED | OUTPATIENT
Start: 2022-04-22 | End: 2022-04-23

## 2022-04-22 RX ORDER — MISOPROSTOL 200 UG/1
800 TABLET ORAL
Status: DISCONTINUED | OUTPATIENT
Start: 2022-04-22 | End: 2022-04-26 | Stop reason: HOSPADM

## 2022-04-22 RX ORDER — ONDANSETRON 2 MG/ML
4 INJECTION INTRAMUSCULAR; INTRAVENOUS EVERY 6 HOURS PRN
Status: DISCONTINUED | OUTPATIENT
Start: 2022-04-22 | End: 2022-04-26 | Stop reason: HOSPADM

## 2022-04-22 RX ORDER — OXYTOCIN 10 [USP'U]/ML
10 INJECTION, SOLUTION INTRAMUSCULAR; INTRAVENOUS
Status: DISCONTINUED | OUTPATIENT
Start: 2022-04-22 | End: 2022-04-26 | Stop reason: HOSPADM

## 2022-04-22 RX ORDER — CARBOPROST TROMETHAMINE 250 UG/ML
INJECTION, SOLUTION INTRAMUSCULAR
Status: DISCONTINUED
Start: 2022-04-22 | End: 2022-04-22 | Stop reason: WASHOUT

## 2022-04-22 RX ORDER — OXYTOCIN 10 [USP'U]/ML
INJECTION, SOLUTION INTRAMUSCULAR; INTRAVENOUS
Status: DISCONTINUED
Start: 2022-04-22 | End: 2022-04-22 | Stop reason: WASHOUT

## 2022-04-22 RX ADMIN — MAGNESIUM SULFATE HEPTAHYDRATE 4 G: 40 INJECTION, SOLUTION INTRAVENOUS at 01:30

## 2022-04-22 RX ADMIN — LABETALOL HYDROCHLORIDE 20 MG: 5 INJECTION, SOLUTION INTRAVENOUS at 01:03

## 2022-04-22 RX ADMIN — MISOPROSTOL 800 MCG: 200 TABLET ORAL at 02:40

## 2022-04-22 RX ADMIN — LABETALOL HYDROCHLORIDE 80 MG: 5 INJECTION, SOLUTION INTRAVENOUS at 01:32

## 2022-04-22 RX ADMIN — CALCIUM GLUCONATE 2 G: 20 INJECTION, SOLUTION INTRAVENOUS at 12:21

## 2022-04-22 RX ADMIN — ACETAMINOPHEN 650 MG: 325 TABLET, FILM COATED ORAL at 10:44

## 2022-04-22 RX ADMIN — LABETALOL HYDROCHLORIDE 40 MG: 5 INJECTION, SOLUTION INTRAVENOUS at 01:16

## 2022-04-22 RX ADMIN — OXYCODONE HYDROCHLORIDE 5 MG: 5 TABLET ORAL at 00:11

## 2022-04-22 RX ADMIN — FENTANYL CITRATE 100 MCG: 50 INJECTION INTRAMUSCULAR; INTRAVENOUS at 00:38

## 2022-04-22 RX ADMIN — ACETAMINOPHEN 650 MG: 325 TABLET, FILM COATED ORAL at 14:14

## 2022-04-22 RX ADMIN — HYDRALAZINE HYDROCHLORIDE 10 MG: 20 INJECTION INTRAMUSCULAR; INTRAVENOUS at 01:43

## 2022-04-22 RX ADMIN — FENTANYL CITRATE 100 MCG: 50 INJECTION INTRAMUSCULAR; INTRAVENOUS at 01:40

## 2022-04-22 RX ADMIN — HYDRALAZINE HYDROCHLORIDE 10 MG: 20 INJECTION INTRAMUSCULAR; INTRAVENOUS at 05:16

## 2022-04-22 RX ADMIN — Medication 30 UNITS: at 02:35

## 2022-04-22 RX ADMIN — SODIUM CHLORIDE, POTASSIUM CHLORIDE, SODIUM LACTATE AND CALCIUM CHLORIDE 50 ML/HR: 600; 310; 30; 20 INJECTION, SOLUTION INTRAVENOUS at 06:54

## 2022-04-22 RX ADMIN — MAGNESIUM SULFATE IN WATER 2 G/HR: 40 INJECTION, SOLUTION INTRAVENOUS at 02:00

## 2022-04-22 ASSESSMENT — ACTIVITIES OF DAILY LIVING (ADL)
ADLS_ACUITY_SCORE: 3

## 2022-04-22 NOTE — PROGRESS NOTES
M doctor reported during Antepartum Huddle this patient was transferred from L & D to ICU after a interuterine fetal demise and delivery last night.  There was no consult for this patient but this writer attempted to contact Dad with a phone  to offer support and discuss family's wishes for disposition.    There is a chart note that a Anabaptism  reached out to the family and Dad stated they did not want to hold the baby or know the gender as the baby was not meant to live on this earth and it was God's will.  Baby is not eligible for hospital disposition as gestational age was estimated at 25w.  Family will need to determine disposition for their baby's remains and whether they want a Anabaptism burial.    This writer called Charge nurse in L & D to enter a Loss consult.  This writer will pass off this information and consult to the Weekend  on site tomorrow.    She can be paged at [phone]    Jaci GARW, MSW, LICSW  Maternal Child Health

## 2022-04-22 NOTE — PHARMACY-ADMISSION MEDICATION HISTORY
Admission Medication History Completed by Pharmacy    See [facility] Admission Navigator for allergy information, preferred outpatient pharmacy, prior to admission medications and immunization status.     Medication History Sources:     Patient    Changes made to PTA medication list (reason):    Added: None    Deleted: None    Changed: None    Additional Information:    Per patient not on any medications at home including OTC.    Prior to Admission medications    Not on File       Date completed: 04/22/22    Medication history completed by: Carla Armas RPH

## 2022-04-22 NOTE — PLAN OF CARE
Goal Outcome Evaluation:     fetal demise 4/22/22 @0234.  fetal vc=146ne, ht=13 3/4in, OFC=9 3/4 in  [id number]

## 2022-04-22 NOTE — PROGRESS NOTES
[facility] Transfer of Care Note    Malena Thomas MRN# [id number]   Age: 31 year old YOB: 1990     Date of Admission:  2022  Admitting Physician:  Isaura Montgomery MD    Admit Dx:   -  at 25w5d   - Intrauterine fetal demise   - DIC  - Placental abruption  - No PNC    Diagnoses at Transfer:  - Same as above, now  s/p induction after IUFD    Procedures:  - Spontaneous vaginal delivery  - Epidural analgesia    Admit HPI and Labor Course:  31 year old  at 25w5d by ultrasound today who presented to the ED for vaginal bleeding and leaking fluid, found to have IUFD. Ultrasound concerning for placental abruption. Vital signs notable for a few elevated BP. Pregnancy notable for no prenatal care.     # Preeclampsia with severe features (BP)  # Pulmonary edema  - Patient was diagnosed with preeclampsia with severe features based on severe range blood pressures  - Blood pressures goals < 160/110. If patient has > 160/110 for two checks fifteen minutes apart, she should receive IV antihypertensive medications. OB Management of Preeclampsia and Obstetric Hypertension has been ordered for you. If she has sustained severe range blood pressures, can start with 20mg IV labetalol and progress through protocol. Page OB PGY2 for further guidance if she develops severe range blood pressures.   - Persistently mild range blood pressures (140/90 - 159/109) may indicate a need for long acting oral anti-hypertensive. OB will continue to follow to guide management of beginning oral antihypertensive therapy.   - Magnesium prophylaxis for eclampsia is a contraindicated in patients with pulmonary edema. Magnesium to discontinue at this time. Mag level most recently 3.8, subtherapeutic for eclampsia prophylaxis.   - If patient were to become eclamptic, seizure prophylaxis protocol is included in OB Management of Preeclampsia and Obstetric Hypertension order set.    - Strict Is&Os  -  Daily weights  - Recommend daily HELLP labs: CBC, Cr, AST, ALT     # Placental abruption  # DIC  # Intrauterine fetal demise at 25w  - Patient underwent induction of labor for intrauterine fetal demise diagnosed in ED. Fetus measuring approximately 25w gestational age.   - Delivery was uncomplicated with EBL of 550 mL. This blood loss is greater than average for an IOL, but not outside normal and does not constitute postpartum hemorhage.  - Possible causes include infection, abruption, thrombophilia or fetal factors such as chromosomal abnormalities, FGR. Given her bleeding and appearance on ultrasound, it appears there has been an abruption. Her WBC count is also very elevated, thus it's possible she has developed intraamniotic infection, had PPROM and then abrupted.   - Social work,  PRN for processing loss  - S/p fetal survey  - S/p IOL resulting in  complicated by DIC  - Summary of products received:   - Hgb 10.2 > 9.7> 7.5 > > 1u pRBC   - Plts 171> 60> 1u plts    - Coags: PTT 31>39; Fibrinogen 279>148, INR 1.15>1.55   - Total product: 1u pRBC, 1u plts, 2u FFP, 1u cryo    # MICHAEL  - MICHAEL possibly due to preeclampsia with severe features or prerenal MICHAEL due to severe hypotension in setting of DIC  - Continue management per primary team    # Postpartum state  - L&D nurse to come to ICU to perform fundal checks q2h. Nurse to assess amount of lochia.  - Monitor for sudden excessive bleeding  - Monitor for fundal tenderness  - No PNC  - PNC labs pending    OB PGY2 Pager [phone]    Joe Montaño MD  Obstetrics, Gynecology, and Women's Health  PGY2  11:16 AM 2022

## 2022-04-22 NOTE — PROGRESS NOTES
"SPIRITUAL HEALTH SERVICES  SPIRITUAL ASSESSMENT Progress Note  [facility] ([facility]) UR 10 ICU     REFERRAL SOURCE: Unit     I tele-visited with the pt's spouse. He welcome the SHS support. He mentioned his spouse is doing okay, but could use a visit from a Scientologist  on Monday. He mentioned it was their first baby \" It was not meant to be ours went back to it's creator\". He shared that he was not interested in the gender , because the baby never made it to this life. I prayed for the family to Allah may Allah make their baby be the one who  welcomes  [facility].    PLAN: SHS will remain the same.    Megan Apodaca  Chaplain Resident  Phone: [phone]    "

## 2022-04-22 NOTE — PROGRESS NOTES
Late entry due to patient care:    After delivery patient declined to see baby. Baby banded and moved to loss room at 0245.

## 2022-04-22 NOTE — PLAN OF CARE
Pt arrived by ambulance as transfer from [place] ED. Pt did not feel movement earlier in the day and has arrived with probable rupture of membranes and bleeding. No heart tones noted at [place] ED. Pt sent to us for verification followed by induction. Pt is visibly distressed. Explanations and comfort given. MD notified of arrival.

## 2022-04-22 NOTE — L&D DELIVERY NOTE
Delivery summary    Malena Thomas is a 31 year old , who presented at 25w5d with PPROM and vaginal bleeding. Fetal demise was confirmed by ultrasound. She was induced with vaginal misoprostol, augmented with AROM and delivered a stillborn male infant, weighing 775g, at 0234. Amniotic fluid was bloody. Umbilical cord without complications. Placenta appeared abnormal aside from large clot on posterior aspect. EBL was 550cc. Pitocin and MD misoprostol administered.     External examination reveals a well-developed male infant. The features of the infant were without syndromic appearance. External anatomy was within normal limits and mouth and anus were patent without cleft lip or palette. The infant has two eyes with orbits, a small anteverted nose with patent nares, an intact lip and palate, and a normal chin. The head is grossly unremarkable with normally formed ears, and no nuchal thickening. The thorax is grossly unremarkable, has two nipple buds, and the sternum ends approximately half the distance from nipple line to umbilicus. The vertebral column is intact, and there are five digits on all extremities with no syndactyly or abnormal creases. The anus is patent and there are no abnormal joint contractures. There is an attached pink-white three vessel cord that appears normal in caliber and length. Placenta had no odor.     Debora Lozano MD MSc  OBGYN Resident, PGY3  2022, 4:27 AM

## 2022-04-22 NOTE — ED PROVIDER NOTES
ED Provider Note  [facility]      History     Chief Complaint   Patient presents with     Vaginal Bleeding     HPI  Malena Thomas is a (20-25 weeks pregnant) 31 year old female without past medical history who presents to the ED today complaining of vaginal bleeding.  She presents to the ED today by Uber. Patient states that she is 5 months pregnant and has not had any prenatal care. She denies ever having a positive pregnancy test.  She states her LMP was 5 months ago.  Patient states that she began having cramping abdominal pain and heavy vaginal bleeding at around 6 PM today.  She is also endorsing dizziness here.  She states this is her second pregnancy, noting a miscarriage in 2018.  Here in the ED, blood pressure was 121/84, temperature 97.9  F and hemoglobin 10.2.  Patient denies any allergies to medication, recent falls or trauma, fever or chills.    Past Medical History  No past medical history on file.  No past surgical history on file.  No current outpatient medications on file.    No Known Allergies  Family History  No family history on file.  Social History       Past medical history, past surgical history, medications, allergies, family history, and social history were reviewed with the patient. No additional pertinent items.       Review of Systems   Gastrointestinal: Positive for abdominal pain.   Genitourinary: Positive for pelvic pain and vaginal bleeding.   All other systems reviewed and are negative.    A complete review of systems was performed with pertinent positives and negatives noted in the HPI, and all other systems negative.    Physical Exam   BP: 121/84  Pulse: 74  Temp: 97.7  F (36.5  C)  Resp: 15  SpO2: 98 %  Physical Exam  Vitals and nursing note reviewed.   Constitutional:       General: She is not in acute distress.  Abdominal:      Comments: Gravid nontender fundal height at umbilicus +6 cm   Genitourinary:     Comments: Bimanual pelvic exam performed by  the OB resident 1 cm dilated with rupture of membranes  Neurological:      Mental Status: She is alert.         ED Course     6:50 PM  The patient was seen and examined by Artemio Montgomery MD in Room ED02.     Procedures       Seen by diagnostic resident on the [place]  Discussed with OB staff on the [facility], requested orders put in.  Hemorrhage is small volume according to the resident         Results for orders placed or performed during the hospital encounter of 04/21/22   iStat Gases Electrolytes ICA Glucose Venous, POCT     Status: Abnormal   Result Value Ref Range    CPB Applied No     Hematocrit POCT 30 (L) 35 - 47 %    Calcium, Ionized Whole Blood POCT 4.8 4.4 - 5.2 mg/dL    Glucose Whole Blood POCT 141 (H) 70 - 99 mg/dL    Bicarbonate Venous POCT 18 (L) 21 - 28 mmol/L    Hemoglobin POCT 10.2 (L) 11.7 - 15.7 g/dL    Potassium POCT 3.6 3.4 - 5.3 mmol/L    Sodium POCT 139 133 - 144 mmol/L    pCO2 Venous POCT 35 (L) 40 - 50 mm Hg    pO2 Venous POCT 25 25 - 47 mm Hg    pH Venous POCT 7.33 7.32 - 7.43    O2 Sat, Venous POCT 43 (L) 94 - 100 %     Medications   lactated ringers infusion (has no administration in time range)        Assessments & Plan (with Medical Decision Making)   Patient is a 31-year-old female who is approximately 25 weeks pregnant with no prenatal care, comes in with vaginal bleeding and abdominal cramping.  Bedside ultrasound by myself and the OB/GYN resident shows no fetal cardiac activity. The bleeding is not heavy. I contacted the L&D on the [facility] and should be transported emergently to L&D on the [facility]. She is hemodynamically stable, she has an IV and the hemorrhage is not severe. Her cervix is open, effaced and she has had rupture of membranes.  She is not febrile and she was informed of the fetal demise.    I have reviewed the nursing notes. I have reviewed the findings, diagnosis, plan and need for follow up with the patient.    New Prescriptions    No medications on file        Final diagnoses:   Fetal demise in benito pregnancy greater than 22 weeks gestation, antepartum   I, Umer Dillard, am serving as a trained medical scribe to document services personally performed by Artemio Montgomery MD, based on the provider's statements to me.     IArtemio MD, was physically present and have reviewed and verified the accuracy of this note documented by Umer Dillard.      --  Artemio Montgomery MD  [facility] EMERGENCY DEPARTMENT  4/21/2022     Artemoi Montgomery MD  04/21/22 1924

## 2022-04-22 NOTE — PROGRESS NOTES
Block note:    0325 patient BP 45/27 on left arm. BP cuff moved to Right arm repeat BP 97/71 at 0327. Fundus firm at this time. MD, PGY-3, and PGY-2 paged to bedside for evaluation. 0332 PGY-3 arrived to bedside and preformed fundal assessment which was 2 below and firm. Magnesium turned off at this time. PGY-2 arrived to bedside to evaluate. 0342 Blood administration started. Anesthesia MD arrived to bedside to start 3rd IV site. Stef PETERS arrived to bedside for evaluation. PGY-2 preformed fundal assessment. 0345 Patient straight cathed with 100 ml output. Anthony sweep performed by PGY-2. No clots present. Bear hugger applied to patient for warmth. Patient bleeding and fundus stable. BP back to baseline. See flow sheets for details.

## 2022-04-22 NOTE — CARE PLAN
Data: Malena Thomas transferred to ICU floor 10 via wheelchair at 0910.  Action: Receiving unit notified of transfer: Yes. Patient and family notified of room change. Report given to Amee at 0900. Belongings sent to receiving unit. Accompanied by Registered Nurse. Oriented patient to surroundings. Call light within reach.   Response: Patient tolerated transfer and is stable enough for the transfer to ICU.

## 2022-04-22 NOTE — PROGRESS NOTES
"Late entry due to patient care:     Saw patient around 0645 for magnesium check. She was feeling well. Mild headache, maybe some floaters in vision, denied SOB, chest pain, RUQ pain. Fundus was firm. Was on magnesium 0.5g/hr due to elevated creatine. LE 2+ pitting edema. Patellar reflexes 3+. 1 beat clonus each foot.     Paged by bedside RN at 0715 re: new SOB. Went to assess patient with anesthesia provider Dr. Mary Watts. Patient reports new SOB, \"working hard to breathe\". Denies chest pain. Appears to be in some respiratory distress. O2 sats low 90s. Tachycardic to 100s. On auscultation of lungs crackles are noted in anterior lung fields.     Assessment: new pulmonary edema    Plan:   - STAT Teg, CXR, pulmonary ultrasound per anesthesia provider  - Discussed w/ ICU provider and recommended transfer to MICU. Signout given.     Discussed w/ Dr. Becerra.     Debora Lozano MD MSc  OBGYN Resident, PGY3  April 22, 2022, 8:06 AM    "

## 2022-04-22 NOTE — PROVIDER NOTIFICATION
04/22/22 0336   Provider Notification   Provider Name/Title drs [email or web address]   Method of Notification Electronic Page   Notification Reason Maternal Vital Sign Change;Bleeding;Other (Comment)  (dr lilly on unit, brought into room)   Bp 94/57

## 2022-04-22 NOTE — CONSULTS
[facility]  OB Consult      Malena Thomas MRN# [id number]   Age: 31 year old YOB: 1990     CC: pregnant, vaginal bleeding    HPI:  Ms. Malena Thomas is a 31 year old  at 5 months' gestation by patient report and unsure LMP who presented to the [place] ED by Uber for vaginal bleeding and cramping.     I was paged to the ED STAT at 1658 to evaluate the patient.    Patient endorses cramping and vaginal bleeding that started around 6 PM today.  She endorses normal fetal movements throughout the day today until about 5 PM.  She denies any leakage of fluid.  She denies any abdominal trauma or accidents, and has not had any vaginal bleeding prior to today.  She denies any fevers or chills. She states that her LMP was 5 months ago, and she has not had any prenatal care.  This is her second pregnancy: She had an elective surgical  in 2018 at about 16 weeks gestation.  She denies any complications with that pregnancy.    Pregnancy Complications:  -No prenatal care    Denies past medical history, medications, or allergies to medications.  Only PSH is surgical  in 2018    ROS:   Negative except as noted in HPI. She denies headache, blurry vision, chest pain, shortness of breath, RUQ pain, nausea, vomiting, dysuria, hematuria or extremity edema.    PE:  Patient Vitals for the past 4 hrs:   BP Temp Temp src Pulse Resp SpO2   22 1858 121/84 97.7  F (36.5  C) Axillary 74 15 98 %      Gen: Lying quietly in bed, no acute distress, responsive to questions  CV: Well perfused  Pulm: Breathing comfortably on RA  Abd: Soft, gravid, non-tender.  Fundus ~4 cm above umbilicus, nontender to palpation.  Pelvic: Scant vaginal bleeding on perineum, a few spots on chux. Gross leakage of blood-tinged fluid.    BSUS: Garner IUP, cephalic, no fetal cardiac activity, placenta anterior    SVE: /-3, no fetal parts or umbilical cord palpated. Bloody show and gross leakage of  fluid.    Memb: Grossly ruptured              Assessment/Plan:  Ms. Malena Thomas is a 31 year old  at ~20-24 weeks' gestation who presents with vaginal bleeding and gross rupture of membranes in the setting of benito intrauterine fetal demise.    Explained to patient that no fetal heartbeat was seen on the ultrasound, which means that the baby is no longer alive. Explained that her water appears to be broken, and luckily her amount of bleeding currently is not dangerous. She will need to go to L&D for further evaluation, and she is agreeable to this. She will have a support person meet her there.    # IUFD, unknown GA  - Formal dating US ordered  - Labs: CBC, T&S, KB, Coags, prenatal labs  - Hgb 10.2, vitals wnl without hypotension, tachycardia, or fever  - Patient is stable for transfer to L&D    Discussed with L&D resident (Dr. Lozano) and OB staff (Dr. Finch) on [facility]. Transferred to [facility] L&D for further assessment and likely induction for IUFD. Rh status unknown, needs Rhogam if Rh negative.    Rosa Danielle MD  OB/GYN, PGY-2  2022, 8:18 PM

## 2022-04-22 NOTE — PROGRESS NOTES
Labor Progress Note     S: Feeling more pressure. Denies headache, vision changes, shortness of breath, chest pain, upper abdominal pain.     O:  Patient Vitals for the past 4 hrs:   BP Temp Temp src Pulse Resp   22 0100 (!) 161/109 -- -- 72 --   22 0045 (!) 161/103 98.5  F (36.9  C) Oral 85 20   22 2230 (!) 149/103 -- -- -- --   22 -- -- -- -- 18     General: appears uncomfortable  VE: 6/100/0, passed some clots    A/P: 31 year old  at 24w3d by US today, here for IOL due to IUFD and PPROM with likely abruption. Progressing well in labor. Now meeting criteria for preeclampsia w/ SF by BP.    # Pre-eclampsia with severe features  - Serial BP monitoring  - IV Antihypertensives prn for sustained severe range blood pressures (>160/>110)  - Labs: HELLP labs ordered  - Daily weights, strict I&Os  - Magnesium: 4g loading dose, followed by 2g per hour   - Mg checks q4h   - Will continue for 24hrs post-delivery    # Vaginal bleeding, likely placental abruption  - CBC, Coags ordered  - T&C x2u pRBC    Anticipate     Debora Lozano MD MSc  OBGYN Resident, PGY3  2022, 1:13 AM

## 2022-04-22 NOTE — H&P
History and Physical     Malena Thomas MRN# [id number]   YOB: 1990 Age: 31 year old      Date of Admission: 2022      Assessment and Plan:   31 year old  at 25w5d by ultrasound today who presented to the ED for vaginal bleeding and leaking fluid, found to have IUFD. Ultrasound concerning for placental abruption. Labs so far notable only for elevated WBC with left shift. Vital signs notable for a few elevated BP. Pregnancy notable for no prenatal care.     # IUFD at 25 weeks  We reviewed some possible causes, including infection, abruption, thrombophilia or fetal factors such as chromosomal abnormalities, FGR. Given her bleeding and appearance on ultrasound, it appears there has been an abruption. Her WBC count is also very elevated, thus it's possible she has developed intraamniotic infection, had PPROM and then abrupted.  - Labs: CBC, coags, KB, TSH, A1c, prenatal labs; patient denies drug use  - Fetal evaluation: will perform fetal survey, send placenta for cultures and path  - IUFD IOL: 24 to 28 weeks: SFP protocol   - Misoprostol 200 mcg vaginally every 4 hours     # PNC  - had no PNC  - PNC labs pending     Patient discussed with Dr. Stef Lozano MD MSc  OBGYN Resident, PGY3  2022, 9:36 PM           HPI:   Malena Thomas is a 31 year old  at 25w5d by radiology ultrasound today who presented to [place] emergency department for vaginal bleeding and cramping. She has     Patient endorses cramping and vaginal bleeding that started around 6 PM today.  She endorses normal fetal movements throughout the day today until about 5 PM.  She denies any leakage of fluid.  She denies any abdominal trauma or accidents, and has not had any vaginal bleeding prior to today.  She denies any fevers or chills. She states that her LMP was 5 months ago, and she has not had any prenatal care.  This is her second pregnancy: She had an elective surgical  in 2018 at  "about 16 weeks gestation.  She denies any complications with that pregnancy.    She does endorse lower abdominal pain that is aching, persistent, doesn't come and go. Started this afternoon. She denies fever, SOB, chest pain, palpitations, N/V, LE swelling/tenderness.  No concerns for headache, vision changes, RUQ or epigastric pain.     OB History:    OB History    Para Term  AB Living   2 0 0 0 1 0   SAB IAB Ectopic Multiple Live Births   0 0 0 0 0      # Outcome Date GA Lbr Bayron/2nd Weight Sex Delivery Anes PTL Lv   2 Current            1 AB 2018 16w0d    IAB           Prenatal Lab Results:  Lab Results   Component Value Date    AS Negative 2022    HGB 9.7 (L) 2022     GBS Status: unknown         Past Medical History:   History reviewed. No pertinent past medical history.          Past Surgical History:   History reviewed. No pertinent surgical history.          Social History:     Social History     Tobacco Use     Smoking status: Never Smoker     Smokeless tobacco: Never Used   Substance Use Topics     Alcohol use: Not Currently             Family History:   History reviewed. No pertinent family history.          Immunizations:     There is no immunization history on file for this patient.         Allergies:   No Known Allergies          Medications:     No medications prior to admission.             Review of Systems & Physical Exam:     The Review of Systems is negative other than noted in the HPI      Patient Vitals for the past 4 hrs:   BP Temp Temp src Resp Height Weight   22 2230 (!) 149/103 -- -- -- -- --   22 -- -- -- 18 -- --   22 135/83 -- -- -- -- --   22 -- 97.5  F (36.4  C) Oral 17 1.575 m (5' 2\") 79.4 kg (175 lb)   22 (!) 163/93 -- -- -- -- --     Gen: shaking, appears uncomfortable, answering questions appropriately  CV: regular rate, well perfused  Lungs: breathing comfortably on room air  Abd: Gravid, mildly " tender in suprapubic area  Ext: trace peripheral extremity edema    SVE: 2/80/-1, some blood on glove  Membranes: ruptured  Presentation: cephalic by BSUS         Data:     Results for orders placed or performed during the hospital encounter of 04/21/22 (from the past 24 hour(s))   iStat Gases Electrolytes ICA Glucose Venous, POCT   Result Value Ref Range    CPB Applied No     Hematocrit POCT 30 (L) 35 - 47 %    Calcium, Ionized Whole Blood POCT 4.8 4.4 - 5.2 mg/dL    Glucose Whole Blood POCT 141 (H) 70 - 99 mg/dL    Bicarbonate Venous POCT 18 (L) 21 - 28 mmol/L    Hemoglobin POCT 10.2 (L) 11.7 - 15.7 g/dL    Potassium POCT 3.6 3.4 - 5.3 mmol/L    Sodium POCT 139 133 - 144 mmol/L    pCO2 Venous POCT 35 (L) 40 - 50 mm Hg    pO2 Venous POCT 25 25 - 47 mm Hg    pH Venous POCT 7.33 7.32 - 7.43    O2 Sat, Venous POCT 43 (L) 94 - 100 %   Asymptomatic COVID-19 Virus (Coronavirus) by PCR Nose    Specimen: Nose; Swab   Result Value Ref Range    SARS CoV2 PCR Negative Negative    Narrative    Testing was performed using the harley  SARS-CoV-2 & Influenza A/B Assay on the harley  [employer]  System.  This test should be ordered for the detection of SARS-COV-2 in individuals who meet SARS-CoV-2 clinical and/or epidemiological criteria. Test performance is unknown in asymptomatic patients.  This test is for in vitro diagnostic use under the FDA EUA for laboratories certified under CLIA to perform moderate and/or high complexity testing. This test has not been FDA cleared or approved.  A negative test does not rule out the presence of PCR inhibitors in the specimen or target RNA in concentration below the limit of detection for the assay. The possibility of a false negative should be considered if the patient's recent exposure or clinical presentation suggests COVID-19.  [facility] are certified under the Clinical Laboratory Improvement Amendments of 1988 (CLIA-88) as qualified to perform moderate and/or high  complexity laboratory testing.   US OB > 14 Weeks    Addendum: 4/21/2022    Previous report reads single living intrauterine gestation.     This was a template error and should read single non living  intrauterine gestation. There are no fetal heart tones.    I have personally reviewed the examination and initial interpretation  and I agree with the findings.    GEORGE ACEVEDO MD         SYSTEM ID:  [id number]      Narrative    EXAMINATION: US OB > 14 WEEKS, 4/21/2022 8:56 PM     COMPARISON: None.    HISTORY: Formal dating.     FINDINGS: There is a single living intrauterine gestation.    Maternal/Uterine findings:  Placental location: Right    Biometry:  Biparietal diameter: 6.4 cm , 26 weeks, 0 days,  Head circumference:24.1 cm , 26 weeks, 2 days,  Abdominal circumference: 20.8 cm , 25 weeks, 3 days,  Femur length: 4.5 cm , 25 weeks, 0 days,  Head circumference to abdominal circumference ratio: 1.2    Estimated fetal weight:  792 grams    Ultrasound gestational age by today's measurements: 25 weeks, 5 days   Estimated date of delivery by today's measurements: 07/30/2022      Impression    IMPRESSION:      There is a single living intrauterine gestation with a gestational age  of 25 weeks, 5 days corresponding to an estimated date of delivery of  07/30/2022.    I have personally reviewed the examination and initial interpretation  and I agree with the findings.    GEORGE ACEVEDO MD         SYSTEM ID:  [id number]   [place] Draw    Narrative    The following orders were created for panel order [place] Draw.  Procedure                               Abnormality         Status                     ---------                               -----------         ------                     Extra Blue Top Tube[[id number]]                              Final result               Extra Red Top Tube[[id number]]                               Final result               Extra Green Top (Lithium...[[id number]]                       Final result               Extra Purple Top Tube[[id number]]                            Final result               Extra Purple Top Tube[[id number]]                            Final result                 Please view results for these tests on the individual orders.   CBC with platelets differential    Narrative    The following orders were created for panel order CBC with platelets differential.  Procedure                               Abnormality         Status                     ---------                               -----------         ------                     CBC with platelets and d...[[id number]]  Abnormal            Final result               RBC and Platelet Morphology[[id number]]                      Final result                 Please view results for these tests on the individual orders.   ABO/Rh type and screen    Narrative    The following orders were created for panel order ABO/Rh type and screen.  Procedure                               Abnormality         Status                     ---------                               -----------         ------                     Adult Type and Screen[[id number]]                            Final result                 Please view results for these tests on the individual orders.   INR   Result Value Ref Range    INR 1.15 0.85 - 1.15   Partial thromboplastin time   Result Value Ref Range    aPTT 31 22 - 38 Seconds   Basic metabolic panel   Result Value Ref Range    Sodium 138 133 - 144 mmol/L    Potassium 3.5 3.4 - 5.3 mmol/L    Chloride 110 (H) 94 - 109 mmol/L    Carbon Dioxide (CO2) 17 (L) 20 - 32 mmol/L    Anion Gap 11 3 - 14 mmol/L    Urea Nitrogen 14 7 - 30 mg/dL    Creatinine 0.89 0.52 - 1.04 mg/dL    Calcium 8.2 (L) 8.5 - 10.1 mg/dL    Glucose 110 (H) 70 - 99 mg/dL    GFR Estimate 89 >60 mL/min/1.73m2   Hemoglobin A1c   Result Value Ref Range    Hemoglobin A1C 4.7 0.0 - 5.6 %   AST   Result Value Ref Range    AST 20 0 - 45 U/L   ALT   Result Value Ref Range     ALT 13 0 - 50 U/L   TSH with free T4 reflex   Result Value Ref Range    TSH 1.94 0.40 - 4.00 mU/L   Extra Blue Top Tube   Result Value Ref Range    Hold Specimen JIC    Extra Red Top Tube   Result Value Ref Range    Hold Specimen JIC    Extra Green Top (Lithium Heparin) Tube   Result Value Ref Range    Hold Specimen JIC    Extra Purple Top Tube   Result Value Ref Range    Hold Specimen JIC    Extra Purple Top Tube   Result Value Ref Range    Hold Specimen JIC    CBC with platelets and differential   Result Value Ref Range    WBC Count 22.4 (H) 4.0 - 11.0 10e3/uL    RBC Count 3.36 (L) 3.80 - 5.20 10e6/uL    Hemoglobin 9.7 (L) 11.7 - 15.7 g/dL    Hematocrit 30.1 (L) 35.0 - 47.0 %    MCV 90 78 - 100 fL    MCH 28.9 26.5 - 33.0 pg    MCHC 32.2 31.5 - 36.5 g/dL    RDW 15.9 (H) 10.0 - 15.0 %    Platelet Count 171 150 - 450 10e3/uL    % Neutrophils 88 %    % Lymphocytes 8 %    % Monocytes 3 %    % Eosinophils 0 %    % Basophils 0 %    % Immature Granulocytes 1 %    NRBCs per 100 WBC 0 <1 /100    Absolute Neutrophils 19.8 (H) 1.6 - 8.3 10e3/uL    Absolute Lymphocytes 1.8 0.8 - 5.3 10e3/uL    Absolute Monocytes 0.7 0.0 - 1.3 10e3/uL    Absolute Eosinophils 0.0 0.0 - 0.7 10e3/uL    Absolute Basophils 0.0 0.0 - 0.2 10e3/uL    Absolute Immature Granulocytes 0.1 <=0.4 10e3/uL    Absolute NRBCs 0.0 10e3/uL   Adult Type and Screen   Result Value Ref Range    ABO/RH(D) O POS     Antibody Screen Negative Negative    SPECIMEN EXPIRATION DATE [id number]    Extra Tube    Narrative    The following orders were created for panel order Extra Tube.  Procedure                               Abnormality         Status                     ---------                               -----------         ------                     Extra Red Top Tube[[id number]]                               Final result                 Please view results for these tests on the individual orders.   Extra Red Top Tube   Result Value Ref Range    Hold Specimen JIC     Extra Tube    Narrative    The following orders were created for panel order Extra Tube.  Procedure                               Abnormality         Status                     ---------                               -----------         ------                     Extra Blue Top Tube[[id number]]                              Final result               Extra Green Top (Lithium...[[id number]]                      Final result                 Please view results for these tests on the individual orders.   Extra Blue Top Tube   Result Value Ref Range    Hold Specimen JIC    Extra Green Top (Lithium Heparin) Tube   Result Value Ref Range    Hold Specimen JIC    RBC and Platelet Morphology   Result Value Ref Range    Platelet Assessment  Automated Count Confirmed. Platelet morphology is normal.     Automated Count Confirmed. Platelet morphology is normal.    RBC Morphology Confirmed RBC Indices

## 2022-04-22 NOTE — PLAN OF CARE
Shift SBAR:  Post partum team called ICU team. Concern for resp. Status, wanted her to be monitored in ICU. Concern for pt dyspnea /pulm. Edema.     - ECHO.  - LE .  - John placed. For accurate I:O. No lasix given, adequate UO. Maintaining oxygen stats on RA.   -1 RBC given. Slowly given not to fluid overload pt. Recheck is at 1800, awaiting.  - Resp. Status. Lung sounds improved over shift. Started off with crackles, to clear/diminished. Stating mid to high 90s on RA. Providers held off on giving lasix. Also, voiding adequately for diuresis.   - Diet advanced from clear liquids to regular. BM while in ICU, loose stools.    Social work consulted for support.     Problem:  Loss  Goal: Optimal Adjustment to Loss  Outcome: Ongoing, Progressing

## 2022-04-22 NOTE — H&P
MEDICAL ICU H&P  04/22/2022    Date of Hospital Admission: 04/21/2022  Date of ICU Admission: 04/22/2022  Reason for Critical Care Admission: Dyspnea, increased work of breathing   Date of Service (when I saw the patient): 04/22/2022    ASSESSMENT: Malena Thomas is a 31 year old female with history of pre-eclampsia with severe features presented to the ED on 4/21/2022 with complaints of vaginal bleeding found to have placental abruption with fetal demise and coagulopathy causing hemorrhagic shock requiring transfusions of multiple blood products (1 RBC, 2 FFP, 1 plt) with acute onset of dyspnea with concern for pulmonary edema worsened by MICHAEL prompting transfer to the ICU for ongoing monitoring and management.      CHANGES and MAJOR THINGS TODAY:   -Transfer to ICU for close monitoring of respiratory status  -Transfuse and trend Hgb  - Stop Mg gtt  - LE doppler ultrasound  - Diuresis pending urine studies    PLAN:    Neuro:  # Pain and sedation  # Anxiety  Currently denies pain, endorses anxiety related recent fetal loss and exacerbated by dyspnea.   - PRN acetaminophen available for pain    # Grief secondary to fetal demise  - Chaplaincy consult, will reach out to davey Nuñez for further support   - Consider social work consult to assist with emotional support measures     Pulmonary:  # Dyspnea. On room air without dyspnea prior to hemorrhage and multiple blood transfusions, now with rapid onset of increased work of breathing, and c/f for pulm edema on CXR, B lines noted on bedside US, bilateral posterior crackles on exam bilaterally, currently without escalation of oxygen needs, sats well on room air (94-98%). Differential includes flash pulmonary edema, TRALI, TACO, pulmonary embolus, although low suspicion in setting of coagulopathy. Given MICHAEL and no supplemental oxygen requirement will defer CTPE for now.   - Goal sats > 92%  - PRN xopenex for SOB/wheezing given tachycardia  - Sputum cx ordered   - Diuresis if  low UOP continues/worsening WOB  - Check D Dimer, US of LE to assess for DVT   - Repeat VBG at 1600    Cardiovascular:  # Risk for eclampsia, found to be preeclamptic  # Hemorrhagic Shock. Prolonged hypotensive episode overnight with vaginal bleeding, now resolved.   - Holding IV Mg gtt due to concern for exacerbation of pulmonary edema (therapeutic goal Mg level of 4.8-8.4gm/dL for prevention of eclampsia)   - Continuous telemetry  -EKG, troponin pending   - Goal SBP <160, DBP <110  - PRN labetalol available   - Echo to assess cardiac function, RV strain?     GI/Nutrition:  # Hypoalbuminemia, mild, expected in the setting of recent pregnancy  # Risk for constipation   - Clear liquid diet, advance as tolerated   - PRN miralax, senna available     Renal/Fluids/Electrolytes:  # Oliguric MICHAEL. Unknown baseline creatinine, 0.89 on admission. Suspect prerenal injury in the setting of hypotension. Received 2 units FFP, 1 plts, 1 pRBCs, 1 l LR fluid resuscitation   # Hypocalcemia  # Hypermagnesemia, secondary to therapeutic Mg gtt. Will hold given concern for pulmonary edema  - brasher in place for close I/O monitoring  - UA, urine Na pending to assess intravascular volume status  - lasix 40 mg X 1 if indicated   - Ica 4.0, replete with 2gm calcium gluconate, recheck in am   - Hold on electrolyte replacement protocols given elevated Creatinine  - Avoid nephrotoxic medications as able.   - Repeat BMP at 1600    OB/GYN:  # Vaginal bleeding secondary placental abruption with fetal demise after IOL with misoprostol due to bleeding. Unclear etiology of fetal demise. Differnetial includes   ml. Stillborn infant delivery vaginally. S/p pitocin and magnesium gtts  - Vaginal exams per Ob-Gyn protocol  - OB/GYN consulted and following, appreciate recommendations     Endocrine:  #Obesity   (BG  since admission)   - Goal BG < 180  - No indications for sliding scale correction currently  - Hypoglycemia protocol in place  "      ID:  # Query Infection. Presented with leukocytosis without focal signs of infection.   - Send urine, sputum, blood cx  - placental cultures and pathology pending  - Monitor fever curve, WBC  - Check PCT, CRP   - Await cultures data, no current indication for abx          Hematology:    # Acute blood loss anemia  # thrombocytopenia, suspect consumptive in setting of placentia abruptio   # concern for DIC INR elevated to 1.55, fibrinogen 148 on admission in setting of active bleeding.   - follow hgb q 12H  - Daily PTT, INR, Fibrinogen   - Transfuse for Hgb goal of 7, plts 10, fibrinogen < 100  - TEG pending   - Hold prophylactic AC given bleeding     Musculoskeletal:  # Weakness/Deconditioning  - OT/PT consulted       Skin:  No active concerns       General Cares/Prophylaxis:    DVT Prophylaxis: Pneumatic Compression Devices pending   GI Prophylaxis: Not indicated  Restraints: Not indicated   Family Communication:  Karey updated at bedside   Code Status: Full Code     Lines/tubes/drains:  - PIV x 3  - John Catheter placed     Disposition:  - Medical ICU     Patient seen and findings/plan discussed with medical ICU staff, Dr. Ashlee Barkley      Clinically Significant Risk Factors Present on Admission               # Coagulation Defect: INR = 1.34 (Ref range: 0.85 - 1.15) and/or PTT = 34 Seconds (Ref range: 22 - 38 Seconds) on admission, will monitor for bleeding  # Thrombocytopenia: Plts = 93 10e3/uL (Ref range: 150 - 450 10e3/uL) on admission, will monitor for bleeding   # Anemia: based on hgb <11   # Obesity: Estimated body mass index is 32.01 kg/m  as calculated from the following:    Height as of this encounter: 1.575 m (5' 2\").    Weight as of this encounter: 79.4 kg (175 lb).            -----------------------------------------------------------------------    HISTORY PRESENTING ILLNESS: Key findings: 31 year old  who presented to the ED with abdominal pain and vaginal " bleeding.  Thought to be around 25w pregnant with no prenatal care.  Found to have IUFD with formal dating US showing gestational age of 25w2d. Developed worsening bleeding and underwent induction of labor. EBL ~550, developed tachycardia and hypotension following delivery requiring transfusion of 1 RBC, 2 FFP, 1 plt, 1 cryo. Following this developed increased dyspnea,tachypnea without hypoxia. CXR revealed likely pulmonary edema. Decision was made to transfer her to ICU for higher level of nursing care given risk of bleeding and potential need for bipap support.       REVIEW OF SYSTEMS: Denies headache, vision changes, chest pain, pressure, nausea, vomiting diarrhea, recent fever, chills, sweating. Endorses dry cough and mild abdominal pain.     PAST MEDICAL HISTORY:   History reviewed. No pertinent past medical history.  SURGICAL HISTORY:  History reviewed. No pertinent surgical history.  SOCIAL HISTORY:  Social History     Socioeconomic History     Marital status:      Spouse name: None     Number of children: None     Years of education: None     Highest education level: None   Tobacco Use     Smoking status: Never Smoker     Smokeless tobacco: Never Used   Substance and Sexual Activity     Alcohol use: Not Currently     Drug use: Never     Sexual activity: Yes     Partners: Male     FAMILY HISTORY:   History reviewed. No pertinent family history.  ALLERGIES:   No Known Allergies  MEDICATIONS:  No current facility-administered medications on file prior to encounter.  No current outpatient medications on file prior to encounter.      PHYSICAL EXAMINATION:  Temp:  [97.5  F (36.4  C)-100  F (37.8  C)] 100  F (37.8  C)  Pulse:  [] 118  Resp:  [15-35] 35  BP: ()/() 140/92  SpO2:  [96 %-100 %] 96 %  General: Alert, lying in bed, NAD   HEENT: NCAT, PERRL, no lymphadenopathy, neck supple  Neuro: A&Ox3, CN II-XII grossly intact. Denies numbness, tingling.   Pulm/Resp: Mildly increased work of  breathing, clear to auscultation in upper lung fields, crackles in posterior bases.   CV: RRR, no m/r/g, pedal and radial pulses brisk, distal extremities WWP  Abdomen: Obese, soft, diffusely tender to deep palpation  : Exam deferred, brasher catheter in place, urine yellow and clear  Incisions/Skin: No lesions, rashes, or wounds on my cursory exam     LABS: Reviewed.   Arterial Blood Gases   No lab results found in last 7 days.  Complete Blood Count   Recent Labs   Lab 04/22/22  0634 04/22/22  0434 04/22/22  0205 04/21/22 2105   WBC 23.0* 22.8* 20.2* 22.4*   HGB 7.1* 8.1* 7.5* 9.7*   PLT 93* 60* 60* 171     Basic Metabolic Panel  Recent Labs   Lab 04/22/22  0634 04/22/22  0434 04/22/22  0205 04/21/22 2105 04/21/22  1900   NA  --   --   --  138 139   POTASSIUM  --   --   --  3.5 3.6   CHLORIDE  --   --   --  110*  --    CO2  --   --   --  17*  --    BUN  --   --   --  14  --    CR 1.59* 1.51* 1.42* 0.89  --    GLC  --   --   --  110* 141*     Liver Function Tests  Recent Labs   Lab 04/22/22  0634 04/22/22  0434 04/22/22  0205 04/21/22  2105   AST 44 48* 49* 20   ALT 20 11 8 13   INR 1.34* 1.56* 1.55* 1.15     Coagulation Profile  Recent Labs   Lab 04/22/22  0634 04/22/22  0434 04/22/22  0205 04/21/22  2105   INR 1.34* 1.56* 1.55* 1.15   PTT 34 39* 39* 31       IMAGING:  Recent Results (from the past 24 hour(s))   US OB > 14 Weeks    Addendum: 4/21/2022    Previous report reads single living intrauterine gestation.     This was a template error and should read single non living  intrauterine gestation. There are no fetal heart tones.    I have personally reviewed the examination and initial interpretation  and I agree with the findings.    GEORGE ACEVEDO MD         SYSTEM ID:  [id number]      Narrative    EXAMINATION: US OB > 14 WEEKS, 4/21/2022 8:56 PM     COMPARISON: None.    HISTORY: Formal dating.     FINDINGS: There is a single living intrauterine gestation.    Maternal/Uterine findings:  Placental location:  Right    Biometry:  Biparietal diameter: 6.4 cm , 26 weeks, 0 days,  Head circumference:24.1 cm , 26 weeks, 2 days,  Abdominal circumference: 20.8 cm , 25 weeks, 3 days,  Femur length: 4.5 cm , 25 weeks, 0 days,  Head circumference to abdominal circumference ratio: 1.2    Estimated fetal weight:  792 grams    Ultrasound gestational age by today's measurements: 25 weeks, 5 days   Estimated date of delivery by today's measurements: 07/30/2022      Impression    IMPRESSION:      There is a single living intrauterine gestation with a gestational age  of 25 weeks, 5 days corresponding to an estimated date of delivery of  07/30/2022.    I have personally reviewed the examination and initial interpretation  and I agree with the findings.    GEORGE ACEVEDO MD         SYSTEM ID:  [id number]   XR Chest Port 1 View    Impression    RESIDENT PRELIMINARY INTERPRETATION  Impression:   Heart appears enlarged which may represent post partum cardiomyopathy  with pulmonary findings suggestive of mild diffuse pulmonary edema and  atelectasis.

## 2022-04-22 NOTE — PROVIDER NOTIFICATION
04/22/22 0325   Vital Signs   Oximeter Heart Rate 82 bpm   BP (!) 45/27   Oxygen Therapy   SpO2 99 %   Dr. Esqueda, Dr. Lozano and primary RN notified of BP above. LR bolus started, magnesium turned off, pitocin run, fundal check firm, U/2 with no additional bleeding. Dynamap run automatically again at 0327 for 97/71.

## 2022-04-22 NOTE — PROGRESS NOTES
"Called to bedside for hypotension in the setting of downtrending coagulation labs.  EBL from delivery approximately 550.  Vitals listed below.    1 unit of blood and 1 unit platelet already in the room.  Started running unit of PRBC in pressure bag. Pitocin also running along with LR bolus. Magnesium stopped.    On physical exam patient is shaking and voices that she feels very cold. Her fundus is firm but 2cm above umbilicus. Verbal consent obtained to perform uterine sweep. Difficulty getting into lower uterine segment due to patient discomfort. Bedside ultrasound performed which showed thin endometrial stripe. Bladder drained w/ straight cath for 100cc of concentrated.     - STAT HELLP labs, coags collected  - 1 unit cryo ordered, blood bank contacted  - Anesthesia resident at bedside, to get another IV    Will continue to monitor closely.     Patient Vitals for the past 12 hrs:   BP Temp Temp src Pulse Resp SpO2 Height Weight   04/22/22 0340 -- 97.5  F (36.4  C) Oral -- -- -- -- --   04/22/22 0337 95/57 -- -- 75 22 -- -- --   04/22/22 0305 (!) 128/106 -- -- -- 16 98 % -- --   04/22/22 0230 110/75 -- -- -- -- 99 % -- --   04/22/22 0220 123/78 -- -- -- -- 99 % -- --   04/22/22 0205 115/82 -- -- -- -- 100 % -- --   04/22/22 0200 110/73 -- -- -- -- 100 % -- --   04/22/22 0155 110/78 -- -- -- -- 100 % -- --   04/22/22 0150 124/89 -- -- -- -- -- -- --   04/22/22 0145 (!) 151/113 -- -- -- -- -- -- --   04/22/22 0140 (!) 159/121 -- -- -- -- -- -- --   04/22/22 0135 (!) 157/114 -- -- -- -- -- -- --   04/22/22 0125 (!) 161/117 -- -- -- -- -- -- --   04/22/22 0113 (!) 186/128 -- -- 74 -- -- -- --   04/22/22 0100 (!) 161/109 -- -- 72 -- -- -- --   04/22/22 0045 (!) 161/103 98.5  F (36.9  C) Oral 85 20 -- -- --   04/21/22 2230 (!) 149/103 -- -- -- -- -- -- --   04/21/22 2127 -- -- -- -- 18 -- -- --   04/21/22 2039 135/83 -- -- -- -- -- -- --   04/21/22 2028 -- 97.5  F (36.4  C) Oral -- 17 -- 1.575 m (5' 2\") 79.4 kg (175 lb) "   04/21/22 2023 (!) 163/93 -- -- -- -- -- -- --   04/21/22 1858 121/84 97.7  F (36.5  C) Axillary 74 15 98 % -- --     Dr. Finch updated and evaluated patient.     Debora Lozano MD MSc  OBGYN Resident, PGY3  April 22, 2022, 4:09 AM

## 2022-04-23 PROBLEM — N17.9 ACUTE KIDNEY FAILURE, UNSPECIFIED (H): Status: ACTIVE | Noted: 2022-04-23

## 2022-04-23 LAB
ALBUMIN SERPL-MCNC: 2.1 G/DL (ref 3.4–5)
ALP SERPL-CCNC: 91 U/L (ref 40–150)
ALT SERPL W P-5'-P-CCNC: 18 U/L (ref 0–50)
ALT SERPL W P-5'-P-CCNC: 25 U/L (ref 0–50)
ANION GAP SERPL CALCULATED.3IONS-SCNC: 3 MMOL/L (ref 3–14)
APTT PPP: 29 SECONDS (ref 22–38)
AST SERPL W P-5'-P-CCNC: 41 U/L (ref 0–45)
AST SERPL W P-5'-P-CCNC: 42 U/L (ref 0–45)
ATRIAL RATE - MUSE: 97 BPM
BILIRUB SERPL-MCNC: 0.3 MG/DL (ref 0.2–1.3)
BUN SERPL-MCNC: 19 MG/DL (ref 7–30)
CALCIUM SERPL-MCNC: 8.2 MG/DL (ref 8.5–10.1)
CHLORIDE BLD-SCNC: 115 MMOL/L (ref 94–109)
CO2 SERPL-SCNC: 24 MMOL/L (ref 20–32)
CREAT SERPL-MCNC: 1.58 MG/DL (ref 0.52–1.04)
CREAT SERPL-MCNC: 1.64 MG/DL (ref 0.52–1.04)
DIASTOLIC BLOOD PRESSURE - MUSE: NORMAL MMHG
ERYTHROCYTE [DISTWIDTH] IN BLOOD BY AUTOMATED COUNT: 15.7 % (ref 10–15)
ERYTHROCYTE [DISTWIDTH] IN BLOOD BY AUTOMATED COUNT: 15.9 % (ref 10–15)
FIBRINOGEN PPP-MCNC: 306 MG/DL (ref 170–490)
GFR SERPL CREATININE-BSD FRML MDRD: 43 ML/MIN/1.73M2
GFR SERPL CREATININE-BSD FRML MDRD: 45 ML/MIN/1.73M2
GLUCOSE BLD-MCNC: 84 MG/DL (ref 70–99)
GLUCOSE BLDC GLUCOMTR-MCNC: 106 MG/DL (ref 70–99)
GLUCOSE BLDC GLUCOMTR-MCNC: 106 MG/DL (ref 70–99)
HCT VFR BLD AUTO: 23.8 % (ref 35–47)
HCT VFR BLD AUTO: 24.8 % (ref 35–47)
HGB BLD-MCNC: 8.1 G/DL (ref 11.7–15.7)
HGB BLD-MCNC: 8.1 G/DL (ref 11.7–15.7)
HGB BLD-MCNC: 8.2 G/DL (ref 11.7–15.7)
HOLD SPECIMEN: NORMAL
INR PPP: 1.02 (ref 0.85–1.15)
INTERPRETATION ECG - MUSE: NORMAL
MCH RBC QN AUTO: 29 PG (ref 26.5–33)
MCH RBC QN AUTO: 29.2 PG (ref 26.5–33)
MCHC RBC AUTO-ENTMCNC: 33.1 G/DL (ref 31.5–36.5)
MCHC RBC AUTO-ENTMCNC: 34 G/DL (ref 31.5–36.5)
MCV RBC AUTO: 86 FL (ref 78–100)
MCV RBC AUTO: 88 FL (ref 78–100)
P AXIS - MUSE: 49 DEGREES
PLATELET # BLD AUTO: 69 10E3/UL (ref 150–450)
PLATELET # BLD AUTO: 72 10E3/UL (ref 150–450)
PLATELET # BLD AUTO: 78 10E3/UL (ref 150–450)
POTASSIUM BLD-SCNC: 4.5 MMOL/L (ref 3.4–5.3)
PR INTERVAL - MUSE: 154 MS
PROT SERPL-MCNC: 5.6 G/DL (ref 6.8–8.8)
QRS DURATION - MUSE: 86 MS
QT - MUSE: 350 MS
QTC - MUSE: 444 MS
R AXIS - MUSE: 76 DEGREES
RBC # BLD AUTO: 2.77 10E6/UL (ref 3.8–5.2)
RBC # BLD AUTO: 2.83 10E6/UL (ref 3.8–5.2)
SODIUM SERPL-SCNC: 142 MMOL/L (ref 133–144)
SYSTOLIC BLOOD PRESSURE - MUSE: NORMAL MMHG
T AXIS - MUSE: 51 DEGREES
VENTRICULAR RATE- MUSE: 97 BPM
WBC # BLD AUTO: 12.5 10E3/UL (ref 4–11)
WBC # BLD AUTO: 12.7 10E3/UL (ref 4–11)

## 2022-04-23 PROCEDURE — 85730 THROMBOPLASTIN TIME PARTIAL: CPT | Performed by: NURSE PRACTITIONER

## 2022-04-23 PROCEDURE — 85049 AUTOMATED PLATELET COUNT: CPT | Performed by: NURSE PRACTITIONER

## 2022-04-23 PROCEDURE — 250N000013 HC RX MED GY IP 250 OP 250 PS 637: Performed by: STUDENT IN AN ORGANIZED HEALTH CARE EDUCATION/TRAINING PROGRAM

## 2022-04-23 PROCEDURE — 999N000111 HC STATISTIC OT IP EVAL DEFER: Performed by: OCCUPATIONAL THERAPIST

## 2022-04-23 PROCEDURE — 84450 TRANSFERASE (AST) (SGOT): CPT | Performed by: OBSTETRICS & GYNECOLOGY

## 2022-04-23 PROCEDURE — 85384 FIBRINOGEN ACTIVITY: CPT | Performed by: NURSE PRACTITIONER

## 2022-04-23 PROCEDURE — 36415 COLL VENOUS BLD VENIPUNCTURE: CPT | Performed by: NURSE PRACTITIONER

## 2022-04-23 PROCEDURE — 85610 PROTHROMBIN TIME: CPT | Performed by: NURSE PRACTITIONER

## 2022-04-23 PROCEDURE — 250N000013 HC RX MED GY IP 250 OP 250 PS 637: Performed by: OBSTETRICS & GYNECOLOGY

## 2022-04-23 PROCEDURE — 99232 SBSQ HOSP IP/OBS MODERATE 35: CPT | Performed by: OBSTETRICS & GYNECOLOGY

## 2022-04-23 PROCEDURE — 85049 AUTOMATED PLATELET COUNT: CPT | Performed by: OBSTETRICS & GYNECOLOGY

## 2022-04-23 PROCEDURE — 250N000013 HC RX MED GY IP 250 OP 250 PS 637: Performed by: NURSE PRACTITIONER

## 2022-04-23 PROCEDURE — 36415 COLL VENOUS BLD VENIPUNCTURE: CPT | Performed by: OBSTETRICS & GYNECOLOGY

## 2022-04-23 PROCEDURE — 82565 ASSAY OF CREATININE: CPT | Performed by: OBSTETRICS & GYNECOLOGY

## 2022-04-23 PROCEDURE — 250N000011 HC RX IP 250 OP 636: Performed by: STUDENT IN AN ORGANIZED HEALTH CARE EDUCATION/TRAINING PROGRAM

## 2022-04-23 PROCEDURE — 120N000002 HC R&B MED SURG/OB UMMC

## 2022-04-23 PROCEDURE — 85018 HEMOGLOBIN: CPT | Performed by: NURSE PRACTITIONER

## 2022-04-23 PROCEDURE — 85018 HEMOGLOBIN: CPT | Performed by: OBSTETRICS & GYNECOLOGY

## 2022-04-23 PROCEDURE — 80053 COMPREHEN METABOLIC PANEL: CPT | Performed by: NURSE PRACTITIONER

## 2022-04-23 PROCEDURE — 84460 ALANINE AMINO (ALT) (SGPT): CPT | Performed by: OBSTETRICS & GYNECOLOGY

## 2022-04-23 RX ORDER — CALCIUM CARBONATE 500 MG/1
500 TABLET, CHEWABLE ORAL 3 TIMES DAILY PRN
Status: DISCONTINUED | OUTPATIENT
Start: 2022-04-23 | End: 2022-04-26 | Stop reason: HOSPADM

## 2022-04-23 RX ORDER — NIFEDIPINE 30 MG/1
30 TABLET, EXTENDED RELEASE ORAL DAILY
Status: DISCONTINUED | OUTPATIENT
Start: 2022-04-23 | End: 2022-04-24

## 2022-04-23 RX ORDER — HYDROXYZINE HYDROCHLORIDE 50 MG/1
50 TABLET, FILM COATED ORAL 3 TIMES DAILY PRN
Status: DISCONTINUED | OUTPATIENT
Start: 2022-04-23 | End: 2022-04-26 | Stop reason: HOSPADM

## 2022-04-23 RX ORDER — CALCIUM CARBONATE 500 MG/1
500 TABLET, CHEWABLE ORAL DAILY PRN
Status: DISCONTINUED | OUTPATIENT
Start: 2022-04-23 | End: 2022-04-23

## 2022-04-23 RX ORDER — NIFEDIPINE 30 MG/1
30 TABLET, EXTENDED RELEASE ORAL ONCE
Status: COMPLETED | OUTPATIENT
Start: 2022-04-23 | End: 2022-04-23

## 2022-04-23 RX ORDER — MAGNESIUM HYDROXIDE/ALUMINUM HYDROXICE/SIMETHICONE 120; 1200; 1200 MG/30ML; MG/30ML; MG/30ML
30 SUSPENSION ORAL EVERY 4 HOURS PRN
Status: DISCONTINUED | OUTPATIENT
Start: 2022-04-23 | End: 2022-04-26 | Stop reason: HOSPADM

## 2022-04-23 RX ADMIN — LABETALOL HYDROCHLORIDE 20 MG: 5 INJECTION, SOLUTION INTRAVENOUS at 09:24

## 2022-04-23 RX ADMIN — LABETALOL HYDROCHLORIDE 40 MG: 5 INJECTION, SOLUTION INTRAVENOUS at 12:19

## 2022-04-23 RX ADMIN — LABETALOL HYDROCHLORIDE 80 MG: 5 INJECTION, SOLUTION INTRAVENOUS at 12:55

## 2022-04-23 RX ADMIN — NIFEDIPINE 30 MG: 30 TABLET, FILM COATED, EXTENDED RELEASE ORAL at 14:46

## 2022-04-23 RX ADMIN — LABETALOL HYDROCHLORIDE 20 MG: 5 INJECTION, SOLUTION INTRAVENOUS at 11:39

## 2022-04-23 RX ADMIN — HYDROXYZINE HYDROCHLORIDE 50 MG: 25 TABLET, FILM COATED ORAL at 13:13

## 2022-04-23 RX ADMIN — DOCUSATE SODIUM 100 MG: 100 CAPSULE, LIQUID FILLED ORAL at 08:18

## 2022-04-23 RX ADMIN — NIFEDIPINE 30 MG: 30 TABLET, FILM COATED, EXTENDED RELEASE ORAL at 12:19

## 2022-04-23 RX ADMIN — CALCIUM CARBONATE (ANTACID) CHEW TAB 500 MG 500 MG: 500 CHEW TAB at 22:59

## 2022-04-23 RX ADMIN — CALCIUM CARBONATE 500 MG: 500 TABLET, CHEWABLE ORAL at 13:12

## 2022-04-23 ASSESSMENT — ACTIVITIES OF DAILY LIVING (ADL)
ADLS_ACUITY_SCORE: 3

## 2022-04-23 NOTE — PLAN OF CARE
Neuro: A&O x4, follows commands, uses call light appropriately. Flat affect. Denies pain.  Cardiac: HR 80-90s. BP stable. A couple readings w/ SBP in 160s but rechecks within parameters - no antihypertensive required overnight.   Pulmonary: O2 sats > 95% on room air. Lung sounds- bilateral expiratory wheezing/diminished; clear/diminished in AM. Denies SOB or chest pain.   GI/: indwelling brasher in place for strick I&Os- adequate output. No BM this shift. Tolerating regular diet.  Activity: ambulate in room, up to bathroom. Steady gait.     Plan: continue to monitor for bleeding, follow up with social work for  home arrangements. Transfer out of ICU when able.     For vital signs and complete assessments, please see documentation flowsheets.       Goal Outcome Evaluation:    Plan of Care Reviewed With: patient     Overall Patient Progress: improving       Problem: Plan of Care - These are the overarching goals to be used throughout the patient stay.    Goal: Plan of Care Review/Shift Note  Description: The Plan of Care Review/Shift note should be completed every shift.  The Outcome Evaluation is a brief statement about your assessment that the patient is improving, declining, or no change.  This information will be displayed automatically on your shift note.  Outcome: Ongoing, Progressing  Flowsheets (Taken 2022 005)  Plan of Care Reviewed With: patient  Overall Patient Progress: improving

## 2022-04-23 NOTE — PROVIDER NOTIFICATION
04/23/22 1430   Provider Notification   Provider Name/Title Dr CARLOS Lozano   Method of Notification Electronic Page   Clarification of repeating nifidipine 30 mg at this time.  Last had nifdepine 30 mg at 1219 and labetol 80 mg IV Push 1356.  Last /79.

## 2022-04-23 NOTE — PROVIDER NOTIFICATION
Dr Lozano notified pt's spouse requesting her to discuss plan of care, questioning need for patient to remain inpatient.  Dr Lozano to bedside to discuss plan of care utilizing Cam[place]onian .  All questions answered, pt and spouse verbalize understanding of importance of remaining inpatient until blood pressures stabilized.

## 2022-04-23 NOTE — PLAN OF CARE
Physical Therapy: Orders received. Chart reviewed and discussed with care team.? Physical Therapy not indicated due to pt being independent with all mobility.? Defer discharge recommendations to medical team.? Will complete orders.

## 2022-04-23 NOTE — PROVIDER NOTIFICATION
Paged OB provider who assessed pt this morning regarding anxiety medication. Pt is sitting in her chair while her  is present.  stated he only has 30 minutes to fill out the paper work with SW. He proceed to state he needs to  the body at 1300. Awaiting a call back or order for pt comfort due to increasing BP even with PRNs.     Provider called and stated to continue with PRN algorithm for BP but ordered atarax.

## 2022-04-23 NOTE — PROGRESS NOTES
"ICU Brief Note    Assessment:  Malena Thomas is a 31 year old female with history of pre-eclampsia with severe features presented to the ED on 4/21/2022 with complaints of vaginal bleeding found to have placental abruption with fetal demise and coagulopathy causing hemorrhagic shock requiring transfusions of multiple blood products (1 RBC, 2 FFP, 1 plt) with acute onset of dyspnea with concern for pulmonary edema worsened by MICHALE prompting transfer to the ICU for ongoing monitoring and management.    BP (!) 151/86   Pulse 98   Temp 98.2  F (36.8  C) (Oral)   Resp 29   Ht 1.575 m (5' 2\")   Wt 92.8 kg (204 lb 9.4 oz)   LMP 11/01/2021 (Approximate)   SpO2 100%   BMI 37.42 kg/m        Plan:  - No acute interventions need from from ICU team  - Ok to transfer from ICU perspective   - The Critical Care service will continue to follow peripherally while the patient is within the ICU. We are readily available should issues arise. Please feel free to contact us for critical care issues with which we may be of assistance. For all other concerns, please contact primary service first.     Kye Chakraborty, NP-C     "

## 2022-04-23 NOTE — PROGRESS NOTES
[facility]  MATERNAL CHILD HEALTH   SOCIAL WORK PROGRESS NOTE      DATA:     SW met with Malena and her  and bedside to go over paperwork related to stillbirth and disposition of remains.     INTERVENTION:     SW reviewed [place] Department of Health stillborn birth certificate forms and autopsy form with Malena and her . Malena reported not wanting to complete the stillborn birth certificate form and that she was told it was for taxes and she did not wish to claim baby on her taxes. SW told her SW was unsure if the form was used for any other purposes.    Malena completed autopsy form, declining an internal autopsy. Malena reported she did not feel up to completing Department of Health paperwork at this time. Malena asked if she would be able to complete the paperwork after her  left.     SW confirmed with security that the remains of baby had been picked up. Security reports they were picked up around 9:30am today. SW informed Malena's nurse that remains have been picked up.     ASSESSMENT:     Malena and her  appeared to coping well under the circumstances. The accepted SW condolences on their loss and acknowledged that this is a difficult time.     PLAN:     SW will cross over information to m-spatial team.       Signature     KIM Dean, LICSW  Weekend/On-call   Please contact the on-call pager for additional needs  [phone]

## 2022-04-23 NOTE — PROGRESS NOTES
25.5 wks fetal demise  at 0234 on .  Transferred from the ICU to [facility] via wheelchair.  Report received from KAI Uribe.  Summary:  Fetal demise, pre E;DIC;pulm edema; acute kidney failure post delivery.  Elevated BPs and treated while in the ICU.  Given nifedipine 30 mg po here per Dr Lozano.  FFU/2; scant flow.  No reported pain.  Pt communicating well - ipad in room for use of  - pt declines.  Pt resting.  John catheter in place.  Pt states understanding of call light use.  Mementoes in envelope on monitor - per report, pt does not want them.  See SW note regarding her assessment today prior to transfer.  Report to Stacy Brown RN.

## 2022-04-23 NOTE — PLAN OF CARE
OT: Orders received. Chart reviewed and discussed with care team.  OT not indicated due to per nursing patient is independent in room.  Also discussed with patient, patient declines any need and will be going home with family assist.  Patient eager to discharge home.  Defer discharge recommendations to medical team.  Will complete orders.

## 2022-04-23 NOTE — PROGRESS NOTES
Postpartum Progress Note  Malena Thomas  [id number]    Subjective:   Patient reports that she is having no pain.  She states her bleeding is a tiny amount.  She denies any headache, chest pain, chest pressure, shortness of breath, or right upper quadrant pain.  She feels like she is doing well emotionally.  She does have a little bit of flashing lights in her vision at times.  John catheter in draining bladder for strict I&O.    Objective:  Patient Vitals for the past 8 hrs:   BP Temp Temp src Pulse Resp SpO2 Weight   04/23/22 1200 -- -- Oral -- -- -- --   04/23/22 1145 (!) 171/98 -- -- 75 12 100 % --   04/23/22 1130 (!) 178/100 -- -- 89 -- 99 % --   04/23/22 1115 (!) 166/104 -- -- 79 -- 99 % --   04/23/22 1100 (!) 164/104 -- -- 78 22 98 % --   04/23/22 1030 -- -- -- 96 16 97 % --   04/23/22 1015 -- -- -- 84 15 95 % --   04/23/22 1000 (!) 142/97 -- -- 87 17 94 % --   04/23/22 0945 (!) 156/100 -- -- 81 (!) 7 96 % --   04/23/22 0930 (!) 155/96 -- -- 88 23 96 % --   04/23/22 0915 (!) 193/114 -- -- 90 (!) 0 97 % --   04/23/22 0900 (!) 169/107 -- -- 99 (!) 7 97 % --   04/23/22 0800 -- 98.2  F (36.8  C) Oral -- -- -- --   04/23/22 0700 (!) 151/86 -- -- 98 29 100 % --   04/23/22 0600 (!) 147/92 -- -- 81 22 98 % --   04/23/22 0500 (!) 146/88 98.1  F (36.7  C) Axillary 80 19 97 % 92.8 kg (204 lb 9.4 oz)   ]    I/O last 3 completed shifts:  In: 1780 [P.O.:1320; I.V.:110]  Out: 4505 [Urine:4505]    General:awake, alert, answering questions appropriately, appears comfortable sitting up in chair  Heart: regular rate  Lungs: breathing comfortably on room air  Abdomen: Soft, non-tender, non-distended  Extremities: 2+ edema in BLE,  2+ bilateral patellar reflexes, one beat of clonus bilaterally     Labs:  Hemoglobin   Date Value Ref Range Status   04/23/2022 8.1 (L) 11.7 - 15.7 g/dL Final     Hemoglobin   Date Value Ref Range Status   04/23/2022 8.1 (L) 11.7 - 15.7 g/dL Final   04/22/2022 8.3 (L) 11.7 - 15.7 g/dL Final        Assessment/Plan: 31 year old  who is postpartum day number 1 s/p  of IUFD, complicated by pre-eclampsia with severe features and DIC and requiring ICU care, now improving.    Pre-eclampsia with severe features:  Currently with sustained severe range BP during my evaluation.  Discussed with patient's RN to give 20mg of IV labetalol now.  If continues to be sustained severe range we will follow protocol for labetalol.    Also ordered nifedipine 30mg XL now.  This was discussed with patient.  Reviewed risk of uncontrolled severe range BPs being stroke and importance of close monitoring of BP in the hospital and treatment of severe range BPs.    The last labetalol 20mg IV was prior to this was given at 0924 today.    On 22 she had IV labetalol 20, 40, 80 (0103, 0116, 0132) and IV hydral 10mg x2 (0143, 0516)  -start nifedipine 30mg XL now with plan to uptitrate PRN  -given 20mg of IV labetalol now for sustained severe range BP  -Continue strict I&O  -repeat pre-eclampsia labs now given increase in BP    Postpartum state:  Patient reports bleeding is minimal  Rh positive, rhogam not indicated     Dispo: patient to be transferred from ICU floor and ICU primary team to L&D floor with OBGYN primary team now.  Will discharge to home when meeting goals and blood pressure well controlled on oral medications.    Cindy Rincon MD  OBGYN

## 2022-04-23 NOTE — PLAN OF CARE
"Shift SBAR:  ICU team signed off awaiting L/D bed. Pt stated she was really tiered. Allowed her to rest and asked for her to order some food in late morning. Pt is currently drink fluids but needs encouragement with food. Pt ambulated in room to bathroom and agreed to sit in the chair.     - BP parameters needed PRNs. See MAR.  Pt stated she wants to go home, while  was present at bedside stating he needs to talk to  because he only has 30 minutes and wants to  the body. Paged provider for PRN anxiety medications for pt.     - Pt denying pain    Report given to Ninfa on L&D. BP within range before transferring pt. All items and pt belongings were sent with.      Problem:  Loss  Goal: Optimal Adjustment to Loss  Outcome: Ongoing, Progressing   Pt stated that  has found a \"community\" to help with getting the baby for burial.   "

## 2022-04-24 LAB
ALBUMIN SERPL-MCNC: 2.3 G/DL (ref 3.4–5)
ALP SERPL-CCNC: 109 U/L (ref 40–150)
ALT SERPL W P-5'-P-CCNC: 38 U/L (ref 0–50)
ANION GAP SERPL CALCULATED.3IONS-SCNC: 7 MMOL/L (ref 3–14)
APTT PPP: 28 SECONDS (ref 22–38)
AST SERPL W P-5'-P-CCNC: 45 U/L (ref 0–45)
BACTERIA SPEC CULT: NORMAL
BILIRUB SERPL-MCNC: 0.4 MG/DL (ref 0.2–1.3)
BUN SERPL-MCNC: 16 MG/DL (ref 7–30)
CALCIUM SERPL-MCNC: 8.5 MG/DL (ref 8.5–10.1)
CHLORIDE BLD-SCNC: 113 MMOL/L (ref 94–109)
CO2 SERPL-SCNC: 24 MMOL/L (ref 20–32)
CREAT SERPL-MCNC: 1.34 MG/DL (ref 0.52–1.04)
ERYTHROCYTE [DISTWIDTH] IN BLOOD BY AUTOMATED COUNT: 15.7 % (ref 10–15)
FIBRINOGEN PPP-MCNC: 377 MG/DL (ref 170–490)
GFR SERPL CREATININE-BSD FRML MDRD: 54 ML/MIN/1.73M2
GLUCOSE BLD-MCNC: 89 MG/DL (ref 70–99)
HCT VFR BLD AUTO: 25 % (ref 35–47)
HGB BLD-MCNC: 8.3 G/DL (ref 11.7–15.7)
INR PPP: 0.96 (ref 0.86–1.14)
MCH RBC QN AUTO: 28.9 PG (ref 26.5–33)
MCHC RBC AUTO-ENTMCNC: 33.2 G/DL (ref 31.5–36.5)
MCV RBC AUTO: 87 FL (ref 78–100)
PLATELET # BLD AUTO: 74 10E3/UL (ref 150–450)
POTASSIUM BLD-SCNC: 4.5 MMOL/L (ref 3.4–5.3)
PROT SERPL-MCNC: 6.2 G/DL (ref 6.8–8.8)
RBC # BLD AUTO: 2.87 10E6/UL (ref 3.8–5.2)
SODIUM SERPL-SCNC: 144 MMOL/L (ref 133–144)
WBC # BLD AUTO: 11.7 10E3/UL (ref 4–11)

## 2022-04-24 PROCEDURE — 250N000013 HC RX MED GY IP 250 OP 250 PS 637: Performed by: NURSE PRACTITIONER

## 2022-04-24 PROCEDURE — 250N000013 HC RX MED GY IP 250 OP 250 PS 637: Performed by: OBSTETRICS & GYNECOLOGY

## 2022-04-24 PROCEDURE — 99232 SBSQ HOSP IP/OBS MODERATE 35: CPT | Mod: GC | Performed by: OBSTETRICS & GYNECOLOGY

## 2022-04-24 PROCEDURE — 120N000002 HC R&B MED SURG/OB UMMC

## 2022-04-24 PROCEDURE — 85730 THROMBOPLASTIN TIME PARTIAL: CPT | Performed by: NURSE PRACTITIONER

## 2022-04-24 PROCEDURE — 250N000013 HC RX MED GY IP 250 OP 250 PS 637: Performed by: STUDENT IN AN ORGANIZED HEALTH CARE EDUCATION/TRAINING PROGRAM

## 2022-04-24 PROCEDURE — 85384 FIBRINOGEN ACTIVITY: CPT | Performed by: NURSE PRACTITIONER

## 2022-04-24 PROCEDURE — 85027 COMPLETE CBC AUTOMATED: CPT | Performed by: STUDENT IN AN ORGANIZED HEALTH CARE EDUCATION/TRAINING PROGRAM

## 2022-04-24 PROCEDURE — 36415 COLL VENOUS BLD VENIPUNCTURE: CPT | Performed by: STUDENT IN AN ORGANIZED HEALTH CARE EDUCATION/TRAINING PROGRAM

## 2022-04-24 PROCEDURE — 80053 COMPREHEN METABOLIC PANEL: CPT | Performed by: NURSE PRACTITIONER

## 2022-04-24 PROCEDURE — 85610 PROTHROMBIN TIME: CPT | Performed by: NURSE PRACTITIONER

## 2022-04-24 RX ORDER — MAGNESIUM SULFATE HEPTAHYDRATE 40 MG/ML
4 INJECTION, SOLUTION INTRAVENOUS
Status: DISCONTINUED | OUTPATIENT
Start: 2022-04-24 | End: 2022-04-26 | Stop reason: HOSPADM

## 2022-04-24 RX ORDER — HYDRALAZINE HYDROCHLORIDE 20 MG/ML
10 INJECTION INTRAMUSCULAR; INTRAVENOUS
Status: DISCONTINUED | OUTPATIENT
Start: 2022-04-24 | End: 2022-04-26 | Stop reason: HOSPADM

## 2022-04-24 RX ORDER — MAGNESIUM SULFATE HEPTAHYDRATE 40 MG/ML
2 INJECTION, SOLUTION INTRAVENOUS
Status: DISCONTINUED | OUTPATIENT
Start: 2022-04-24 | End: 2022-04-26 | Stop reason: HOSPADM

## 2022-04-24 RX ORDER — LORAZEPAM 2 MG/ML
2 INJECTION INTRAMUSCULAR
Status: DISCONTINUED | OUTPATIENT
Start: 2022-04-24 | End: 2022-04-26 | Stop reason: HOSPADM

## 2022-04-24 RX ORDER — MAGNESIUM SULFATE HEPTAHYDRATE 500 MG/ML
10 INJECTION, SOLUTION INTRAMUSCULAR; INTRAVENOUS
Status: DISCONTINUED | OUTPATIENT
Start: 2022-04-24 | End: 2022-04-26 | Stop reason: HOSPADM

## 2022-04-24 RX ORDER — NIFEDIPINE 30 MG/1
60 TABLET, EXTENDED RELEASE ORAL DAILY
Status: DISCONTINUED | OUTPATIENT
Start: 2022-04-24 | End: 2022-04-26 | Stop reason: HOSPADM

## 2022-04-24 RX ORDER — LABETALOL HYDROCHLORIDE 5 MG/ML
20-80 INJECTION, SOLUTION INTRAVENOUS EVERY 10 MIN PRN
Status: DISCONTINUED | OUTPATIENT
Start: 2022-04-24 | End: 2022-04-26 | Stop reason: HOSPADM

## 2022-04-24 RX ADMIN — NIFEDIPINE 60 MG: 30 TABLET, FILM COATED, EXTENDED RELEASE ORAL at 09:03

## 2022-04-24 RX ADMIN — ACETAMINOPHEN 650 MG: 325 TABLET, FILM COATED ORAL at 09:03

## 2022-04-24 RX ADMIN — ACETAMINOPHEN 650 MG: 325 TABLET, FILM COATED ORAL at 17:52

## 2022-04-24 RX ADMIN — ACETAMINOPHEN 650 MG: 325 TABLET, FILM COATED ORAL at 02:44

## 2022-04-24 RX ADMIN — DOCUSATE SODIUM 100 MG: 100 CAPSULE, LIQUID FILLED ORAL at 09:03

## 2022-04-24 NOTE — CARE PLAN
Pt able to sleep some between cares. Afebrile. BP WDL this shift. SpO2 between %. Pt reported slight headache which resolved with PRN tylenol. Pt denies vision changes, URQ pain. Pt produced 2,950 amount of pale yellow urine this shift. Continuing plan of care.

## 2022-04-24 NOTE — CONSULTS
[facility]  MATERNAL CHILD HEALTH   SOCIAL WORK PROGRESS NOTE      DATA:     Pt is a 32yo  Citizen of [place] American female who presents with a 25w gestation IUFD. Her hospitalization has been complicated by no prenatal care, lack of insurance coverage and a portion of her stay being in the ICU for hemorrhagic shock . SW was consulted for financial concerns, as pt states she is between jobs and does not have health insurance. Pt and partner are concerned about the cost of this hospitalization and have been eager to leave.    INTERVENTION:       Chart review    Communication with interdisciplinary team, primarily  prasanna Kumar    Assessed pt needs via phone conversation with pt's partner and partner over the phone, with assistance from [place] .    Referred pt to [employer] for assistance with MA application.    ASSESSMENT:     Pt states that she was last employed in 2021, at which time she had health insurance through her employer.  She has not had access to health insurance since that time, but is presently working. Partner indicates that between the two of them, they make around $2000/month.    Writer indicates that it is likely pt would qualify for emergency medical assistance, if not MA, and that if she were to obtain it soon, it could backdate to the beginning of this hospitalization. Writer indicated that someone from [employer] would be reaching out to the patient and/or her partner early next week to assist with an application for MA.  Partner expressed appreciation and stated they would await the call on Monday.    PLAN:     SW will continue to follow for supportive intervention.    KIM Stone, [facility]  Clinical   [facility]  After hours DILAN Pager: [phone]

## 2022-04-24 NOTE — PROGRESS NOTES
Postpartum Progress Note  Malena Thomas  [id number]    Subjective:   Malena is feeling well today. She is having no pain.  She states her bleeding is a tiny amount.  She denies any headache, chest pain, chest pressure, shortness of breath, or right upper quadrant pain.  She feels like she is doing well emotionally. Would like to see SW once prior to discharge.    Objective:  Patient Vitals for the past 8 hrs:   BP Temp Temp src Pulse Resp SpO2 Weight   22 1108 126/72 -- -- -- -- 99 % --   22 1000 -- -- -- -- -- 98 % --   22 0900 132/80 98.2  F (36.8  C) Oral -- 16 99 % 87 kg (191 lb 12.8 oz)   22 0644 133/76 97.8  F (36.6  C) Oral 93 18 -- --     I/O last 3 completed shifts:  In: 1601 [P.O.:1601]  Out: 7025 [Urine:7025]    General:awake, alert, answering questions appropriately, appears comfortable sitting up in chair  Heart: regular rate  Lungs: breathing comfortably on room air    Labs:   Latest Reference Range & Units 22 06:43   Sodium 133 - 144 mmol/L 144   Potassium 3.4 - 5.3 mmol/L 4.5   Chloride 94 - 109 mmol/L 113 (H)   Carbon Dioxide 20 - 32 mmol/L 24   Urea Nitrogen 7 - 30 mg/dL 16   Creatinine 0.52 - 1.04 mg/dL 1.34 (H)   GFR Estimate >60 mL/min/1.73m2 54 (L) [1]   Calcium 8.5 - 10.1 mg/dL 8.5   Anion Gap 3 - 14 mmol/L 7   Albumin 3.4 - 5.0 g/dL 2.3 (L)   Protein Total 6.8 - 8.8 g/dL 6.2 (L)   Bilirubin Total 0.2 - 1.3 mg/dL 0.4   Alkaline Phosphatase 40 - 150 U/L 109   ALT 0 - 50 U/L 38   AST 0 - 45 U/L 45   Glucose 70 - 99 mg/dL 89   WBC 4.0 - 11.0 10e3/uL 11.7 (H)   Hemoglobin 11.7 - 15.7 g/dL 8.3 (L)   Hematocrit 35.0 - 47.0 % 25.0 (L)   Platelet Count 150 - 450 10e3/uL 74 (L)   RBC Count 3.80 - 5.20 10e6/uL 2.87 (L)   MCV 78 - 100 fL 87   MCH 26.5 - 33.0 pg 28.9   MCHC 31.5 - 36.5 g/dL 33.2   RDW 10.0 - 15.0 % 15.7 (H)   INR 0.86 - 1.14  0.96   PTT 22 - 38 Seconds 28   Fibrinogen 170 - 490 mg/dL 377     Assessment/Plan: 31 year old  who is postpartum day number 2 s/p   of IUFD, complicated by pre-eclampsia with severe features and DIC, s/p ICU admission. Now stabilized. Doing well.     Pre-eclampsia with severe features:  - D#2 Procardia 60 mg XL. Titrate prn  - Monitor inpatient until at least . Home with BP cuff.   -Continue strict I&O  -repeat pre-eclampsia labs as needed    DIC  - Stabilized  - Repeat CBC in AM    Postpartum state:  Patient reports bleeding is minimal  Rh positive, rhogam not indicated     Dispo: home like  pending ongoing adequate BP control.    Patient seen and discussed with Dr. Becerra.     Justina Cintron MD  OBGYN PGY-3  22 11:41 AM

## 2022-04-24 NOTE — DISCHARGE SUMMARY
[facility]   Discharge Summary    Malena Thomas MRN# [id number]   Age: 31 year old YOB: 1990     Date of Admission:  2022  Date of Discharge::  22   Admitting Physician:  Isaura Finch MD  Discharge Physician:  Kayla Brown MD          Admission Diagnoses:   - IUFD measuring 25w2d  - Vaginal bleeding  - PPROM  - Evidence of placental abruption on ultrasound  - No prenatal care          Discharge Diagnosis:   - IUFD measuring 25w2d, now delivered  - Placental abruption  - Preeclampsia w/ severe features  - DIC, resolved  - Pulmonary edema, resolved  - Acute blood loss anemia           Procedures:     Procedure(s): -   - Multiple blood product transfusions: 1u pRBC, 1u plts, 2u FFP, 1u cryo          Medications Prior to Admission:     No medications prior to admission.             Discharge Medications:        Review of your medicines      START taking      Dose / Directions   NIFEdipine ER 60 MG 24 hr tablet  Commonly known as: ADALAT CC  Used for: Pre-eclampsia in second trimester      Dose: 60 mg  Take 1 tablet (60 mg) by mouth daily  Quantity: 60 tablet  Refills: 0           Where to get your medicines      These medications were sent to [facility] [address]  [address] [address]    Phone: [phone]     NIFEdipine ER 60 MG 24 hr tablet              Consultations:   ICU  Social work           Brief Admission History   31 year old  at 25w5d by ultrasound today who presented to the ED for vaginal bleeding and leaking fluid, found to have IUFD. Ultrasound concerning for placental abruption. Labs so far notable only for elevated WBC with left shift. Vital signs notable for a few elevated BP. Pregnancy notable for no prenatal care.          Brief Intrapartum Course:   She underwent induction of labor with vaginal misoprostol. Her labor was augmented with AROM. During her labor course she  became progressively more hypertensive, ultimately requiring escalating doses of IV antihypertensives. She met criteria for preeclampsia w/ SF and was started on magnesium for seizure prevention. Her labs were notable for increasing creatinine, and increasing coagulopathy. On 4/22/22 at 0234 she delivered a morphologically normal stillborn male infant and placenta with evidence of abruption. EBL at time of delivery was 550cc.           Hospital Course:   Within the few hours after delivery, she became hypotensive and was given 1u pRBC, 1u plts, 2u FFP due to  worsening coags. She went on to develop clinical pulmonary edema and was transferred to the ICU on HD#2.     In the ICU she continued to have elevated BP and was started on nifedipine XL which was up-titrated to 60mg XL by the time of her discharge. She diuresed well and was stable to return to the floor on HD#3. Magnesium was kept off as her BP control improved with nifedipine and her creatinine was elevated. It appropriately downtrended during her stay. Her hemoglobin and platelets also bessy appropriately after transfusions and her coagulopathy had resolved by time of discharge. As part of her workup, APLS labs drawn and negative.    On discharge, her pain was well controlled. Vaginal bleeding is similar to peak menstrual flow.  Voiding without difficulty.  Ambulating well and tolerating a normal diet.  No fever.     Post-partum hemoglobin: 8.3  Contraception: patient undecided  Rh positive, Rhogam not indicated   Rubella immune, MMR not indicated           Discharge Instructions and Follow-Up:     Discharge diet: Regular   Discharge activity: Pelvic rest for 6 weeks including no sexual intercourse, tampons, or douching.    Discharge follow-up: Follow up on 4/29/2022           Discharge Disposition:     Discharged to home in stable condition     Debora Lozano MD MSc  OBGYN Resident, PGY3  April 26, 2022, 5:13 PM

## 2022-04-24 NOTE — PROVIDER NOTIFICATION
04/24/22 1100   Provider Notification   Provider Name/Title Dr Cintron/Dr Becerra   Method of Notification At Bedside   Notification Reason Status Update  (DAILY ROUNDING)   Daily rounding by [place] OB team. Pt stable this AM. Received increased Nifedipine dose this morning (from 30mg to 60mg). Urine output 200+mls/hour, edema is decreasing. Mild headache resolved after tylenol and rest. SpO2 consistently %, will transition from continuous SpO2 monitoring to spot checks w/ vitals.

## 2022-04-24 NOTE — PLAN OF CARE
Goal Outcome Evaluation:    Pt in stable condition this shift.  BPs wdl or mildly elevated, no severe range BPs.  Pt denies pain, headache, SOB, epigastric pain.  This evening complained of heartburn, Dr Lozano notified and updated order for more frequent TUMS requested.  Pt produced 2250ml of urine this shift, per Dr Lozano okay to remove indwelling urethral catheter , removed at 2230.  Pt and spouse expressed appreciation and some measure of relief after talking to  about financial concerns.  Pt pleasant but quiet, appears to be coping with loss appropriately.  Reviewed with pt that mementoes at bedside, encouraged her to take them home in case she wants them later.  Plan to continue with current care plan.

## 2022-04-24 NOTE — PROVIDER NOTIFICATION
04/24/22 0533   Provider Notification   Provider Name/Title MD Blake   Method of Notification Electronic Page   Notification Reason Maternal Vital Sign Change   Pt O2 sat below 95%

## 2022-04-24 NOTE — PLAN OF CARE
Pt has been resting comfortably this shift. Mild headache in morning has resolved, denies any visual disturbances or epigastric pain. Lungs auscultate clear, pt denies any SOB. Diuresing well, >200mls/hour. Tolerating regular diet, no bowel movement since 4/22/22. Feeling some tenderness in right breast, has cold packs available and encouraged  wearing a tight fitting bra. Declined afternoon dose of tylenol. Anticipate repeat labs in AM along with social work follow-up.

## 2022-04-25 LAB
ALT SERPL W P-5'-P-CCNC: 64 U/L (ref 0–50)
APTT PPP: 30 SECONDS (ref 22–38)
AST SERPL W P-5'-P-CCNC: 64 U/L (ref 0–45)
BACTERIA SPEC CULT: ABNORMAL
CREAT SERPL-MCNC: 1.28 MG/DL (ref 0.52–1.04)
DRVVT SCREEN RATIO: 0.87
ERYTHROCYTE [DISTWIDTH] IN BLOOD BY AUTOMATED COUNT: 15.8 % (ref 10–15)
FIBRINOGEN PPP-MCNC: 427 MG/DL (ref 170–490)
GFR SERPL CREATININE-BSD FRML MDRD: 58 ML/MIN/1.73M2
HCT VFR BLD AUTO: 26.8 % (ref 35–47)
HGB BLD-MCNC: 10.4 G/DL (ref 11.7–15.7)
HGB BLD-MCNC: 8.9 G/DL (ref 11.7–15.7)
INR PPP: 1.01 (ref 0.85–1.15)
LA PPP-IMP: NEGATIVE
LUPUS INTERPRETATION: NORMAL
MCH RBC QN AUTO: 29 PG (ref 26.5–33)
MCHC RBC AUTO-ENTMCNC: 33.2 G/DL (ref 31.5–36.5)
MCV RBC AUTO: 87 FL (ref 78–100)
PLATELET # BLD AUTO: 58 10E3/UL (ref 150–450)
PLATELET # BLD AUTO: 60 10E3/UL (ref 150–450)
PTT RATIO: 1.14
RBC # BLD AUTO: 3.07 10E6/UL (ref 3.8–5.2)
THROMBIN TIME: 15.2 SECONDS (ref 13–19)
WBC # BLD AUTO: 9.6 10E3/UL (ref 4–11)

## 2022-04-25 PROCEDURE — 85730 THROMBOPLASTIN TIME PARTIAL: CPT | Performed by: NURSE PRACTITIONER

## 2022-04-25 PROCEDURE — 85384 FIBRINOGEN ACTIVITY: CPT | Performed by: NURSE PRACTITIONER

## 2022-04-25 PROCEDURE — 36415 COLL VENOUS BLD VENIPUNCTURE: CPT | Performed by: STUDENT IN AN ORGANIZED HEALTH CARE EDUCATION/TRAINING PROGRAM

## 2022-04-25 PROCEDURE — 84450 TRANSFERASE (AST) (SGOT): CPT | Performed by: STUDENT IN AN ORGANIZED HEALTH CARE EDUCATION/TRAINING PROGRAM

## 2022-04-25 PROCEDURE — 80053 COMPREHEN METABOLIC PANEL: CPT | Performed by: NURSE PRACTITIONER

## 2022-04-25 PROCEDURE — 99232 SBSQ HOSP IP/OBS MODERATE 35: CPT | Mod: GC | Performed by: OBSTETRICS & GYNECOLOGY

## 2022-04-25 PROCEDURE — 85390 FIBRINOLYSINS SCREEN I&R: CPT | Mod: 26 | Performed by: PATHOLOGY

## 2022-04-25 PROCEDURE — 120N000002 HC R&B MED SURG/OB UMMC

## 2022-04-25 PROCEDURE — 80053 COMPREHEN METABOLIC PANEL: CPT | Performed by: STUDENT IN AN ORGANIZED HEALTH CARE EDUCATION/TRAINING PROGRAM

## 2022-04-25 PROCEDURE — 84460 ALANINE AMINO (ALT) (SGPT): CPT | Performed by: STUDENT IN AN ORGANIZED HEALTH CARE EDUCATION/TRAINING PROGRAM

## 2022-04-25 PROCEDURE — 85610 PROTHROMBIN TIME: CPT | Performed by: NURSE PRACTITIONER

## 2022-04-25 PROCEDURE — 85018 HEMOGLOBIN: CPT | Performed by: STUDENT IN AN ORGANIZED HEALTH CARE EDUCATION/TRAINING PROGRAM

## 2022-04-25 PROCEDURE — 85049 AUTOMATED PLATELET COUNT: CPT | Performed by: STUDENT IN AN ORGANIZED HEALTH CARE EDUCATION/TRAINING PROGRAM

## 2022-04-25 PROCEDURE — 85730 THROMBOPLASTIN TIME PARTIAL: CPT | Performed by: STUDENT IN AN ORGANIZED HEALTH CARE EDUCATION/TRAINING PROGRAM

## 2022-04-25 PROCEDURE — 250N000013 HC RX MED GY IP 250 OP 250 PS 637: Performed by: STUDENT IN AN ORGANIZED HEALTH CARE EDUCATION/TRAINING PROGRAM

## 2022-04-25 PROCEDURE — 86146 BETA-2 GLYCOPROTEIN ANTIBODY: CPT | Performed by: STUDENT IN AN ORGANIZED HEALTH CARE EDUCATION/TRAINING PROGRAM

## 2022-04-25 PROCEDURE — 250N000013 HC RX MED GY IP 250 OP 250 PS 637: Performed by: OBSTETRICS & GYNECOLOGY

## 2022-04-25 PROCEDURE — 86147 CARDIOLIPIN ANTIBODY EA IG: CPT | Performed by: STUDENT IN AN ORGANIZED HEALTH CARE EDUCATION/TRAINING PROGRAM

## 2022-04-25 PROCEDURE — 85027 COMPLETE CBC AUTOMATED: CPT | Performed by: STUDENT IN AN ORGANIZED HEALTH CARE EDUCATION/TRAINING PROGRAM

## 2022-04-25 RX ADMIN — NIFEDIPINE 60 MG: 30 TABLET, FILM COATED, EXTENDED RELEASE ORAL at 08:54

## 2022-04-25 RX ADMIN — DOCUSATE SODIUM 100 MG: 100 CAPSULE, LIQUID FILLED ORAL at 08:54

## 2022-04-25 NOTE — CONSULTS
Pt was able to meet with on-call SW to obtain contact information for the Financial Counseling office to assist with the Medical Assistance Application.  No other needs identified at this time.  Patient is looking forward to going home today.    Jaci SRIVASTAVA, MSW, [facility]  Maternal Child Health

## 2022-04-25 NOTE — PROVIDER NOTIFICATION
04/25/22 1750   Provider Notification   Provider Name/Title Dr Esqueda   Method of Notification Electronic Page   Notification Reason Lab Results   HELLP labs worsening. Platelets now down to 58, ALT/AST elevated to 64. Pt remains asymptomatic. Normal BP.

## 2022-04-25 NOTE — PROGRESS NOTES
Postpartum Progress Note  Malena Thomas  [id number]    Subjective: Malena is feeling well today. She is having no pain.  She states her bleeding is a tiny amount.  She denies any headache, chest pain, chest pressure, shortness of breath, or right upper quadrant pain.  She feels like she is doing well emotionally and feels well supported. Stayed overnight to see social work and check BPs. She feels well and would like to go later on today if able.       Objective:  Patient Vitals for the past 8 hrs:   BP Temp Temp src Resp SpO2   22 0218 125/79 98.6  F (37  C) Oral 18 97 %   22 2212 120/69 98.5  F (36.9  C) Oral 18 --     I/O last 3 completed shifts:  In: 2419 [P.O.:2416; I.V.:3]  Out: 7400 [Urine:7400]    General:awake, alert, answering questions appropriately, appears comfortable sitting up in chair  Heart: regular rate  Lungs: breathing comfortably on room air   Abd: soft, NT, fundus firm and below the U    Labs:  AM CBC pending     Assessment/Plan: 31 year old  who is postpartum day number 3 s/p  of IUFD, complicated by pre-eclampsia with severe features and DIC, s/p ICU admission. Now stabilized. Doing well.     Pre-eclampsia with severe features:  - D#3 Procardia 60 mg XL. Titrate prn - BP within goal range.   - Monitor inpatient until at least . Home with BP cuff.   -Continue strict I&O  -repeat pre-eclampsia labs as needed    DIC  - Stabilized  - Repeat CBC pending     Postpartum state:  Patient reports bleeding is minimal  Rh positive, rhogam not indicated     Dispo: home today pending ongoing adequate BP control/SW meeting.    Zuly Phan MD   OBGYN PGY-3  22     Physician Attestation   I, Kayla Brown MD, personally examined and evaluated this patient.  I discussed the patient with the resident/fellow and care team, and agree with the assessment and plan of care as documented in the note of 22.      I personally reviewed vital signs, medications,  labs and exam.    Key findings: Reviewed lab results. Creatinine stable. Platelets have trended down since yesterday. Liver enzymes up a little. Discussed would like to establish improvement of all organ systems prior to discharge. Will repeat labs this afternoon.   Kayla Brown MD  Date of Service (when I saw the patient): 04/25/22      Recent Results (from the past 48 hour(s))   AST    Collection Time: 04/23/22 12:16 PM   Result Value Ref Range    AST 42 0 - 45 U/L   ALT    Collection Time: 04/23/22 12:16 PM   Result Value Ref Range    ALT 25 0 - 50 U/L   Creatinine    Collection Time: 04/23/22 12:16 PM   Result Value Ref Range    Creatinine 1.58 (H) 0.52 - 1.04 mg/dL    GFR Estimate 45 (L) >60 mL/min/1.73m2   Hemoglobin    Collection Time: 04/23/22 12:16 PM   Result Value Ref Range    Hemoglobin 8.1 (L) 11.7 - 15.7 g/dL   Platelet count    Collection Time: 04/23/22 12:16 PM   Result Value Ref Range    Platelet Count 72 (L) 150 - 450 10e3/uL   CBC with platelets    Collection Time: 04/23/22  7:46 PM   Result Value Ref Range    WBC Count 12.7 (H) 4.0 - 11.0 10e3/uL    RBC Count 2.83 (L) 3.80 - 5.20 10e6/uL    Hemoglobin 8.2 (L) 11.7 - 15.7 g/dL    Hematocrit 24.8 (L) 35.0 - 47.0 %    MCV 88 78 - 100 fL    MCH 29.0 26.5 - 33.0 pg    MCHC 33.1 31.5 - 36.5 g/dL    RDW 15.9 (H) 10.0 - 15.0 %    Platelet Count 78 (L) 150 - 450 10e3/uL   Extra Green Top (Lithium Heparin) Tube    Collection Time: 04/23/22  7:46 PM   Result Value Ref Range    Hold Specimen JIC    Fibrinogen activity    Collection Time: 04/24/22  6:43 AM   Result Value Ref Range    Fibrinogen Activity 377 170 - 490 mg/dL   INR    Collection Time: 04/24/22  6:43 AM   Result Value Ref Range    INR 0.96 0.86 - 1.14   Partial thromboplastin time    Collection Time: 04/24/22  6:43 AM   Result Value Ref Range    aPTT 28 22 - 38 Seconds   Comprehensive metabolic panel    Collection Time: 04/24/22  6:43 AM   Result Value Ref Range    Sodium 144 133 - 144  mmol/L    Potassium 4.5 3.4 - 5.3 mmol/L    Chloride 113 (H) 94 - 109 mmol/L    Carbon Dioxide (CO2) 24 20 - 32 mmol/L    Anion Gap 7 3 - 14 mmol/L    Urea Nitrogen 16 7 - 30 mg/dL    Creatinine 1.34 (H) 0.52 - 1.04 mg/dL    Calcium 8.5 8.5 - 10.1 mg/dL    Glucose 89 70 - 99 mg/dL    Alkaline Phosphatase 109 40 - 150 U/L    AST 45 0 - 45 U/L    ALT 38 0 - 50 U/L    Protein Total 6.2 (L) 6.8 - 8.8 g/dL    Albumin 2.3 (L) 3.4 - 5.0 g/dL    Bilirubin Total 0.4 0.2 - 1.3 mg/dL    GFR Estimate 54 (L) >60 mL/min/1.73m2   CBC with platelets    Collection Time: 04/24/22  6:43 AM   Result Value Ref Range    WBC Count 11.7 (H) 4.0 - 11.0 10e3/uL    RBC Count 2.87 (L) 3.80 - 5.20 10e6/uL    Hemoglobin 8.3 (L) 11.7 - 15.7 g/dL    Hematocrit 25.0 (L) 35.0 - 47.0 %    MCV 87 78 - 100 fL    MCH 28.9 26.5 - 33.0 pg    MCHC 33.2 31.5 - 36.5 g/dL    RDW 15.7 (H) 10.0 - 15.0 %    Platelet Count 74 (L) 150 - 450 10e3/uL   Fibrinogen activity    Collection Time: 04/25/22  7:25 AM   Result Value Ref Range    Fibrinogen Activity 427 170 - 490 mg/dL   INR    Collection Time: 04/25/22  7:25 AM   Result Value Ref Range    INR 1.01 0.85 - 1.15   Partial thromboplastin time    Collection Time: 04/25/22  7:25 AM   Result Value Ref Range    aPTT 30 22 - 38 Seconds   Comprehensive metabolic panel    Collection Time: 04/25/22  7:25 AM   Result Value Ref Range    Sodium 140 133 - 144 mmol/L    Potassium 4.1 3.4 - 5.3 mmol/L    Chloride 108 94 - 109 mmol/L    Carbon Dioxide (CO2) 22 20 - 32 mmol/L    Anion Gap 10 3 - 14 mmol/L    Urea Nitrogen 18 7 - 30 mg/dL    Creatinine 1.36 (H) 0.52 - 1.04 mg/dL    Calcium 8.2 (L) 8.5 - 10.1 mg/dL    Glucose 122 (H) 70 - 99 mg/dL    Alkaline Phosphatase 117 40 - 150 U/L    AST 50 (H) 0 - 45 U/L    ALT 48 0 - 50 U/L    Protein Total 6.5 (L) 6.8 - 8.8 g/dL    Albumin 2.4 (L) 3.4 - 5.0 g/dL    Bilirubin Total 0.7 0.2 - 1.3 mg/dL    GFR Estimate 53 (L) >60 mL/min/1.73m2   CBC with platelets    Collection Time:  04/25/22  7:25 AM   Result Value Ref Range    WBC Count 9.6 4.0 - 11.0 10e3/uL    RBC Count 3.07 (L) 3.80 - 5.20 10e6/uL    Hemoglobin 8.9 (L) 11.7 - 15.7 g/dL    Hematocrit 26.8 (L) 35.0 - 47.0 %    MCV 87 78 - 100 fL    MCH 29.0 26.5 - 33.0 pg    MCHC 33.2 31.5 - 36.5 g/dL    RDW 15.8 (H) 10.0 - 15.0 %    Platelet Count 60 (L) 150 - 450 10e3/uL   Creatinine    Collection Time: 04/25/22  7:25 AM   Result Value Ref Range    Creatinine 1.36 (H) 0.52 - 1.04 mg/dL    GFR Estimate 53 (L) >60 mL/min/1.73m2

## 2022-04-25 NOTE — PROGRESS NOTES
"SPIRITUAL HEALTH SERVICES  SPIRITUAL ASSESSMENT Progress Note  [facility] ([facility]) UR 4BOB     REFERRAL SOURCE: Unit     I visited the pt in her room. Pt welcomed the SHS visit. She mentioned \" my pregnancy was going well accept I was fasting. Allah has another plan for me, and there is always a reason. I will see my son in [facility]. I prayed for her may Allah mone her healthy baby. She mentioned there is no any family support beside my spouse.    PLAN: SHS will remain the same.    Megan Ya Resident  Phone: [phone]    "

## 2022-04-25 NOTE — PLAN OF CARE
Afebrile, VSS. Pt denies pain. Good UOP, see I&O flowsheet. Plan for discharge today. Continue plan of care, contact MD with questions/concerns.

## 2022-04-25 NOTE — PLAN OF CARE
Goal Outcome Evaluation:    Pt continuing to improve this shift.  BPs wdl.  Denies SOB, epigastric pain, vision changes.  She complained of a mild headache which was relieved by tylenol (see MAR).  Continues to diurese with 2300ml urine output this shift.  Reports scant lochia.  Bilateral breast tenderness with colostrum leaking from right breast.  Belly band applied to breasts with breast pads.  Icepacks encouraged and available, pt not interested at this time.  Plan to continue to monitor I/O closely overnight, anticipate discharge tomorrow.

## 2022-04-25 NOTE — PLAN OF CARE
"Pt was hopeful she would discharge home today, but slight deterioration in HELLP labs this AM with repeat labs (drawn at 1645) pending. BP remains stable on daily dose of Nifedipine. Pt denies pre-e symptoms, stating \"I feel fine.\" Up ad bebeto to void good amounts. No lochia noted. Eating regular diet with encouragement. Slept for most of the morning. Pt had follow up with  and  today.  at bedside now.            "

## 2022-04-26 VITALS
RESPIRATION RATE: 16 BRPM | WEIGHT: 184.9 LBS | TEMPERATURE: 98.8 F | OXYGEN SATURATION: 100 % | BODY MASS INDEX: 34.03 KG/M2 | HEART RATE: 92 BPM | SYSTOLIC BLOOD PRESSURE: 122 MMHG | DIASTOLIC BLOOD PRESSURE: 79 MMHG | HEIGHT: 62 IN

## 2022-04-26 LAB
ALBUMIN SERPL-MCNC: 2.5 G/DL (ref 3.4–5)
ALP SERPL-CCNC: 130 U/L (ref 40–150)
ALT SERPL W P-5'-P-CCNC: 62 U/L (ref 0–50)
ALT SERPL W P-5'-P-CCNC: 62 U/L (ref 0–50)
ALT SERPL W P-5'-P-CCNC: 72 U/L (ref 0–50)
ANION GAP SERPL CALCULATED.3IONS-SCNC: 7 MMOL/L (ref 3–14)
APTT PPP: 31 SECONDS (ref 22–38)
APTT PPP: 31 SECONDS (ref 22–38)
AST SERPL W P-5'-P-CCNC: 38 U/L (ref 0–45)
AST SERPL W P-5'-P-CCNC: 59 U/L (ref 0–45)
AST SERPL W P-5'-P-CCNC: 59 U/L (ref 0–45)
B2 GLYCOPROT1 IGG SERPL IA-ACNC: <0.8 U/ML
B2 GLYCOPROT1 IGM SERPL IA-ACNC: <2.4 U/ML
BILIRUB SERPL-MCNC: 0.5 MG/DL (ref 0.2–1.3)
BUN SERPL-MCNC: 19 MG/DL (ref 7–30)
CALCIUM SERPL-MCNC: 8 MG/DL (ref 8.5–10.1)
CARDIOLIPIN IGG SER IA-ACNC: <2 GPL-U/ML
CARDIOLIPIN IGG SER IA-ACNC: NEGATIVE
CARDIOLIPIN IGM SER IA-ACNC: 2.2 MPL-U/ML
CARDIOLIPIN IGM SER IA-ACNC: NEGATIVE
CHLORIDE BLD-SCNC: 110 MMOL/L (ref 94–109)
CO2 SERPL-SCNC: 22 MMOL/L (ref 20–32)
CREAT SERPL-MCNC: 1.25 MG/DL (ref 0.52–1.04)
CREAT SERPL-MCNC: 1.25 MG/DL (ref 0.52–1.04)
CREAT SERPL-MCNC: 1.3 MG/DL (ref 0.52–1.04)
ERYTHROCYTE [DISTWIDTH] IN BLOOD BY AUTOMATED COUNT: 16.4 % (ref 10–15)
FIBRINOGEN PPP-MCNC: 448 MG/DL (ref 170–490)
FIBRINOGEN PPP-MCNC: 470 MG/DL (ref 170–490)
GFR SERPL CREATININE-BSD FRML MDRD: 56 ML/MIN/1.73M2
GFR SERPL CREATININE-BSD FRML MDRD: 59 ML/MIN/1.73M2
GFR SERPL CREATININE-BSD FRML MDRD: 59 ML/MIN/1.73M2
GLUCOSE BLD-MCNC: 95 MG/DL (ref 70–99)
HCT VFR BLD AUTO: 27.3 % (ref 35–47)
HGB BLD-MCNC: 9.1 G/DL (ref 11.7–15.7)
INR PPP: 1 (ref 0.85–1.15)
INR PPP: 1.02 (ref 0.86–1.14)
MCH RBC QN AUTO: 28.9 PG (ref 26.5–33)
MCHC RBC AUTO-ENTMCNC: 33.3 G/DL (ref 31.5–36.5)
MCV RBC AUTO: 87 FL (ref 78–100)
PLATELET # BLD AUTO: 52 10E3/UL (ref 150–450)
PLATELET # BLD AUTO: 55 10E3/UL (ref 150–450)
POTASSIUM BLD-SCNC: 4.3 MMOL/L (ref 3.4–5.3)
PROT SERPL-MCNC: 6.7 G/DL (ref 6.8–8.8)
RBC # BLD AUTO: 3.15 10E6/UL (ref 3.8–5.2)
SODIUM SERPL-SCNC: 139 MMOL/L (ref 133–144)
WBC # BLD AUTO: 9.6 10E3/UL (ref 4–11)

## 2022-04-26 PROCEDURE — 85384 FIBRINOGEN ACTIVITY: CPT | Performed by: STUDENT IN AN ORGANIZED HEALTH CARE EDUCATION/TRAINING PROGRAM

## 2022-04-26 PROCEDURE — 85014 HEMATOCRIT: CPT | Performed by: STUDENT IN AN ORGANIZED HEALTH CARE EDUCATION/TRAINING PROGRAM

## 2022-04-26 PROCEDURE — 85610 PROTHROMBIN TIME: CPT | Performed by: STUDENT IN AN ORGANIZED HEALTH CARE EDUCATION/TRAINING PROGRAM

## 2022-04-26 PROCEDURE — 85027 COMPLETE CBC AUTOMATED: CPT | Performed by: STUDENT IN AN ORGANIZED HEALTH CARE EDUCATION/TRAINING PROGRAM

## 2022-04-26 PROCEDURE — 80053 COMPREHEN METABOLIC PANEL: CPT | Performed by: NURSE PRACTITIONER

## 2022-04-26 PROCEDURE — 85730 THROMBOPLASTIN TIME PARTIAL: CPT | Performed by: NURSE PRACTITIONER

## 2022-04-26 PROCEDURE — 85730 THROMBOPLASTIN TIME PARTIAL: CPT | Performed by: STUDENT IN AN ORGANIZED HEALTH CARE EDUCATION/TRAINING PROGRAM

## 2022-04-26 PROCEDURE — 85610 PROTHROMBIN TIME: CPT | Performed by: NURSE PRACTITIONER

## 2022-04-26 PROCEDURE — 82565 ASSAY OF CREATININE: CPT | Performed by: STUDENT IN AN ORGANIZED HEALTH CARE EDUCATION/TRAINING PROGRAM

## 2022-04-26 PROCEDURE — 85049 AUTOMATED PLATELET COUNT: CPT | Performed by: STUDENT IN AN ORGANIZED HEALTH CARE EDUCATION/TRAINING PROGRAM

## 2022-04-26 PROCEDURE — 36415 COLL VENOUS BLD VENIPUNCTURE: CPT | Performed by: STUDENT IN AN ORGANIZED HEALTH CARE EDUCATION/TRAINING PROGRAM

## 2022-04-26 PROCEDURE — 250N000013 HC RX MED GY IP 250 OP 250 PS 637: Performed by: OBSTETRICS & GYNECOLOGY

## 2022-04-26 PROCEDURE — 250N000013 HC RX MED GY IP 250 OP 250 PS 637: Performed by: STUDENT IN AN ORGANIZED HEALTH CARE EDUCATION/TRAINING PROGRAM

## 2022-04-26 PROCEDURE — 84460 ALANINE AMINO (ALT) (SGPT): CPT | Performed by: STUDENT IN AN ORGANIZED HEALTH CARE EDUCATION/TRAINING PROGRAM

## 2022-04-26 PROCEDURE — 99238 HOSP IP/OBS DSCHRG MGMT 30/<: CPT | Mod: GC | Performed by: OBSTETRICS & GYNECOLOGY

## 2022-04-26 PROCEDURE — 84450 TRANSFERASE (AST) (SGOT): CPT | Performed by: STUDENT IN AN ORGANIZED HEALTH CARE EDUCATION/TRAINING PROGRAM

## 2022-04-26 PROCEDURE — 85384 FIBRINOGEN ACTIVITY: CPT | Performed by: NURSE PRACTITIONER

## 2022-04-26 RX ORDER — OMEPRAZOLE 20 MG/1
20 TABLET, DELAYED RELEASE ORAL DAILY
Qty: 90 TABLET | Refills: 0 | Status: SHIPPED | OUTPATIENT
Start: 2022-04-26 | End: 2023-02-15

## 2022-04-26 RX ORDER — FERROUS SULFATE 325(65) MG
325 TABLET ORAL
Qty: 90 TABLET | Refills: 0 | Status: SHIPPED | OUTPATIENT
Start: 2022-04-26 | End: 2023-02-15

## 2022-04-26 RX ORDER — ACETAMINOPHEN 325 MG/1
325-650 TABLET ORAL EVERY 6 HOURS PRN
Qty: 90 TABLET | Refills: 0 | Status: SHIPPED | OUTPATIENT
Start: 2022-04-26 | End: 2023-02-15

## 2022-04-26 RX ORDER — POLYETHYLENE GLYCOL 3350 17 G/17G
1 POWDER, FOR SOLUTION ORAL DAILY PRN
Qty: 850 G | Refills: 1 | Status: SHIPPED | OUTPATIENT
Start: 2022-04-26 | End: 2023-02-15

## 2022-04-26 RX ADMIN — DOCUSATE SODIUM 100 MG: 100 CAPSULE, LIQUID FILLED ORAL at 07:52

## 2022-04-26 RX ADMIN — NIFEDIPINE 60 MG: 30 TABLET, FILM COATED, EXTENDED RELEASE ORAL at 07:52

## 2022-04-26 NOTE — PLAN OF CARE
Provider at bedside at 1638 to discuss discharge with pt. Pt agreeable to discharge with plan to be seen in clinic on frideay. Discharge meds ordered. IV removed. Discharge instructions reviewed with pt, questions answered. Pt discharged at 1945.

## 2022-04-26 NOTE — PLAN OF CARE
Patient is here following IUFD delivery on 4/22. Denies pain, bleeding, other complaints. Plan for discharge when labs stable and platelets improving.

## 2022-04-26 NOTE — PLAN OF CARE
Data: Maternal status VSS. Afebrile. Denies vaginal bleeding and pain. Signs and symptoms of infection not present. Patient ambulated in her room in the evening and took a shower. Linens were changed. Patients blood pressures have been normal through the night. Denies signs and symptoms of pre-e except she had a few visual changes overnight. Patient stated she had some blurring and dark spots that resolved. Provider notified. Patient also has some bruising on her left forearm and is awaiting her morning laboratory results. Will continue to monitor.   Action/interventions: Encourage voiding, hydration, and ambulation.  Response: Patient rested well through the night.   Plan: Continue expectant management. Report given to Julia QUINN

## 2022-04-26 NOTE — PROGRESS NOTES
Postpartum Progress Note  Malena Thomas  [id number]    Subjective: Malena is feeling well okay. She is having no pain.  She states her bleeding is a tiny amount.  She denies any headache, chest pain, chest pressure, shortness of breath, or right upper quadrant pain.  She feels like she is doing well emotionally and feels well supported. Stayed inpatient for continued monitoring of labs given up-trending yesterday. She feels well and would like to go later on today if able.       Objective:  Patient Vitals for the past 24 hrs:   BP Temp Temp src Pulse Resp Weight   22 0605 134/83 98.4  F (36.9  C) Oral -- 16 83.9 kg (184 lb 14.4 oz)   22 0336 129/74 98.7  F (37.1  C) Oral -- 16 --   22 2334 131/73 99.2  F (37.3  C) Oral 92 16 --   22 1947 124/78 98.9  F (37.2  C) Oral -- 16 --   22 1725 125/73 -- -- 113 -- --   22 1514 139/81 98.2  F (36.8  C) Oral 92 18 --   22 1100 122/72 98.4  F (36.9  C) Oral -- 18 --   22 0900 126/79 98.2  F (36.8  C) Oral -- 16 --       I/O last 3 completed shifts:  In: 640 [P.O.:640]  Out: 2650 [Urine:2650]    General:awake, alert, answering questions appropriately, appears comfortable sitting up in chair  Heart: regular rate  Lungs: breathing comfortably on room air   Abd: soft, NT, fundus firm and below the U    Labs:  - Cr 0.89>>>1.59>1.66 () >1.58>1.34>1.36 >1.28 >1.25  - AST 20>49>>48>64>59  - ALT 13>>20>50>64>62  - Hgb 10.2>>7.5>6.7> > 1u pRBC>>8.3>8.9> 10.4  - Plts 171> 60> 1u plts>>72>74>60 > 58 > 52      Assessment/Plan: 31 year old  who is postpartum day number 4 s/p  of IUFD, complicated by pre-eclampsia with severe features and DIC, s/p ICU admission. Now stabilized, was meting goals of discharge yesterday, however her labs up tredned so she was kept overnight. AM labs resulted, stable. Doing well clinically.     Pre-eclampsia with severe features:  - D#4 Procardia 60 mg XL. Titrate prn - BP within goal range.   -  Continue to monitor. Home with BP cuff.   - Continue strict I&O  - Repeat pre-eclampsia labs this AM   - Daily weights   - HELLP labs elevated stable since yesterday, not appreciably down-trending    DIC  - Hgb stable, plts down-trending (see above)    Postpartum state:  Patient reports bleeding is minimal  Rh positive, rhogam not indicated     Dispo: home today pending labs     Bev Lui MD, MPH  OBGYN PGY-4  4/26/2022 8:06 AM

## 2022-04-27 LAB
BACTERIA BLD CULT: NO GROWTH
BACTERIA BLD CULT: NO GROWTH

## 2022-04-29 ENCOUNTER — OFFICE VISIT (OUTPATIENT)
Dept: OBGYN | Facility: CLINIC | Age: 32
End: 2022-04-29

## 2022-04-29 VITALS
TEMPERATURE: 97.7 F | DIASTOLIC BLOOD PRESSURE: 100 MMHG | HEART RATE: 98 BPM | BODY MASS INDEX: 34.61 KG/M2 | WEIGHT: 189.2 LBS | SYSTOLIC BLOOD PRESSURE: 139 MMHG

## 2022-04-29 DIAGNOSIS — Z87.59 HISTORY OF IUFD: ICD-10-CM

## 2022-04-29 LAB
ALT SERPL W P-5'-P-CCNC: 48 U/L (ref 0–50)
AST SERPL W P-5'-P-CCNC: 25 U/L (ref 0–45)
CREAT SERPL-MCNC: 1.09 MG/DL (ref 0.52–1.04)
ERYTHROCYTE [DISTWIDTH] IN BLOOD BY AUTOMATED COUNT: 18 % (ref 10–15)
GFR SERPL CREATININE-BSD FRML MDRD: 70 ML/MIN/1.73M2
HCT VFR BLD AUTO: 30.7 % (ref 35–47)
HGB BLD-MCNC: 10.2 G/DL (ref 11.7–15.7)
MCH RBC QN AUTO: 29.6 PG (ref 26.5–33)
MCHC RBC AUTO-ENTMCNC: 33.2 G/DL (ref 31.5–36.5)
MCV RBC AUTO: 89 FL (ref 78–100)
PLATELET # BLD AUTO: 108 10E3/UL (ref 150–450)
RBC # BLD AUTO: 3.45 10E6/UL (ref 3.8–5.2)
WBC # BLD AUTO: 8.3 10E3/UL (ref 4–11)

## 2022-04-29 PROCEDURE — 84460 ALANINE AMINO (ALT) (SGPT): CPT | Performed by: OBSTETRICS & GYNECOLOGY

## 2022-04-29 PROCEDURE — 82565 ASSAY OF CREATININE: CPT | Performed by: OBSTETRICS & GYNECOLOGY

## 2022-04-29 PROCEDURE — 99213 OFFICE O/P EST LOW 20 MIN: CPT | Performed by: OBSTETRICS & GYNECOLOGY

## 2022-04-29 PROCEDURE — 85027 COMPLETE CBC AUTOMATED: CPT | Performed by: OBSTETRICS & GYNECOLOGY

## 2022-04-29 PROCEDURE — 84450 TRANSFERASE (AST) (SGOT): CPT | Performed by: OBSTETRICS & GYNECOLOGY

## 2022-04-29 PROCEDURE — 36415 COLL VENOUS BLD VENIPUNCTURE: CPT | Performed by: OBSTETRICS & GYNECOLOGY

## 2022-04-29 NOTE — PROGRESS NOTES
"OB Progress Note     CC: F/U blood pressure check     HPI: Malena Thomas is a 32 YO  who presents for a close-interval post-partum visit. She was admitted on 2022 and found to have an IUFD at 25 weeks gestation and severe pre-eclampsia/HELLP. She had a  of a stillborn infant complicated by DIC/hemorrage as well as pulmonary edema and was briefly admitted to the ICU but was able to be discharged on 2022. Her HELLP labs were trended while inpatient and her platelets were stable at 55 upon discharge and AST/ALT were stable at 38/72 and her creatine stable at 1.3. Her blood pressures were well controlled on Nifedipine 60 mg ER and she was discharged with this medication.     Since hospital discharge she reports that she is physically doing well. Denies headaches, abdominal pain, nausea/vomiting. No concerns about bleeding. She reports having a good support system at home and has been coping with the loss of her baby. She reports that she is able to sleep at night and denies need for additional resources at this time. She has been taking her Nifedipine since being discharged but did not take it today. She does not have a home BP cuff.     O: Vital signs:  Temp: 97.7  F (36.5  C)   BP: (!) 139/100 Pulse: 98             Weight: 85.8 kg (189 lb 3.2 oz)  Estimated body mass index is 34.61 kg/m  as calculated from the following:    Height as of 22: 1.575 m (5' 2\").    Weight as of this encounter: 85.8 kg (189 lb 3.2 oz).    General: Patient alert and oriented, no acute distress  CV: no peripheral edema or cyanosis  Resp: normal respiratory effort and equal lung expansion  Ext: non-tender, trace edema    Assessment and Plan:   Malena Thomas is a 32 YO  who presents for close-interval PP visit after IOL due to IUFD and HELLP syndrome   -Patient reports that she is coping ok, declines additional resources at this time but is aware that they are available   -Patient discharged home on Nifedipine 60 " mg ER but did not take her medication today. No symptoms today and patient agreeable to taking her medication once she gets home. Reviewed the importance of tight BP control to avoid maternal complications.   -Reviewed warning signs of hypertensive urgency/emergency and worsening HELLP/pre-e   -HELLP labs repeated today and patient scheduled for another BP check next week     Dispo: RTC within one week for BP check or sooner JERRY Fields MD

## 2022-05-04 ENCOUNTER — ALLIED HEALTH/NURSE VISIT (OUTPATIENT)
Dept: NURSING | Facility: CLINIC | Age: 32
End: 2022-05-04

## 2022-05-04 VITALS
SYSTOLIC BLOOD PRESSURE: 125 MMHG | HEART RATE: 68 BPM | DIASTOLIC BLOOD PRESSURE: 86 MMHG | BODY MASS INDEX: 33.6 KG/M2 | WEIGHT: 183.7 LBS

## 2022-05-04 DIAGNOSIS — O13.9 PREGNANCY INDUCED HYPERTENSION: Primary | ICD-10-CM

## 2022-05-04 LAB
PATH REPORT.COMMENTS IMP SPEC: NORMAL
PATH REPORT.FINAL DX SPEC: NORMAL
PATH REPORT.GROSS SPEC: NORMAL
PATH REPORT.MICROSCOPIC SPEC OTHER STN: NORMAL
PATH REPORT.RELEVANT HX SPEC: NORMAL
PHOTO IMAGE: NORMAL

## 2022-05-04 PROCEDURE — 99207 PR NO CHARGE NURSE ONLY: CPT

## 2022-05-05 LAB
ALBUMIN SERPL-MCNC: 2.4 G/DL (ref 3.4–5)
ALP SERPL-CCNC: 117 U/L (ref 40–150)
ALT SERPL W P-5'-P-CCNC: 48 U/L (ref 0–50)
ANION GAP SERPL CALCULATED.3IONS-SCNC: 10 MMOL/L (ref 3–14)
AST SERPL W P-5'-P-CCNC: 50 U/L (ref 0–45)
BILIRUB SERPL-MCNC: 0.7 MG/DL (ref 0.2–1.3)
BUN SERPL-MCNC: 18 MG/DL (ref 7–30)
CALCIUM SERPL-MCNC: 8.2 MG/DL (ref 8.5–10.1)
CHLORIDE BLD-SCNC: 108 MMOL/L (ref 94–109)
CO2 SERPL-SCNC: 22 MMOL/L (ref 20–32)
CREAT SERPL-MCNC: 1.36 MG/DL (ref 0.52–1.04)
CREAT SERPL-MCNC: 1.36 MG/DL (ref 0.52–1.04)
GFR SERPL CREATININE-BSD FRML MDRD: 53 ML/MIN/1.73M2
GFR SERPL CREATININE-BSD FRML MDRD: 53 ML/MIN/1.73M2
GLUCOSE BLD-MCNC: 122 MG/DL (ref 70–99)
POTASSIUM BLD-SCNC: 4.1 MMOL/L (ref 3.4–5.3)
PROT SERPL-MCNC: 6.5 G/DL (ref 6.8–8.8)
SODIUM SERPL-SCNC: 140 MMOL/L (ref 133–144)

## 2022-05-29 ENCOUNTER — HEALTH MAINTENANCE LETTER (OUTPATIENT)
Age: 32
End: 2022-05-29

## 2022-10-03 ENCOUNTER — HEALTH MAINTENANCE LETTER (OUTPATIENT)
Age: 32
End: 2022-10-03

## 2022-11-16 ENCOUNTER — HOSPITAL ENCOUNTER (EMERGENCY)
Facility: CLINIC | Age: 32
Discharge: HOME | End: 2022-11-16
Attending: EMERGENCY MEDICINE | Admitting: EMERGENCY MEDICINE

## 2022-11-16 ENCOUNTER — APPOINTMENT (OUTPATIENT)
Dept: ULTRASOUND IMAGING | Facility: CLINIC | Age: 32
End: 2022-11-16
Attending: EMERGENCY MEDICINE

## 2022-11-16 VITALS
HEART RATE: 72 BPM | OXYGEN SATURATION: 100 % | RESPIRATION RATE: 16 BRPM | SYSTOLIC BLOOD PRESSURE: 118 MMHG | WEIGHT: 180 LBS | TEMPERATURE: 98.1 F | HEIGHT: 62 IN | DIASTOLIC BLOOD PRESSURE: 70 MMHG | BODY MASS INDEX: 33.13 KG/M2

## 2022-11-16 DIAGNOSIS — O03.9 MISCARRIAGE AT 8 TO 28 WEEKS GESTATION: Primary | ICD-10-CM

## 2022-11-16 DIAGNOSIS — R31.9 URINARY TRACT INFECTION WITH HEMATURIA, SITE UNSPECIFIED: ICD-10-CM

## 2022-11-16 DIAGNOSIS — N39.0 URINARY TRACT INFECTION WITH HEMATURIA, SITE UNSPECIFIED: ICD-10-CM

## 2022-11-16 LAB
ALBUMIN SERPL-MCNC: 3 G/DL (ref 3.4–5)
ALBUMIN UR-MCNC: >=300 MG/DL
ALP SERPL-CCNC: 75 U/L (ref 40–150)
ALT SERPL W P-5'-P-CCNC: 13 U/L (ref 0–50)
ANION GAP SERPL CALCULATED.3IONS-SCNC: 5 MMOL/L (ref 3–14)
APPEARANCE UR: ABNORMAL
APTT PPP: 27 SECONDS (ref 22–38)
AST SERPL W P-5'-P-CCNC: 18 U/L (ref 0–45)
B-HCG SERPL-ACNC: 2075 IU/L (ref 0–5)
BASOPHILS # BLD AUTO: 0 10E3/UL (ref 0–0.2)
BASOPHILS NFR BLD AUTO: 0 %
BILIRUB SERPL-MCNC: 0.4 MG/DL (ref 0.2–1.3)
BILIRUB UR QL STRIP: ABNORMAL
BUN SERPL-MCNC: 9 MG/DL (ref 7–30)
CA-I BLD-MCNC: 4.8 MG/DL (ref 4.4–5.2)
CALCIUM SERPL-MCNC: 8.7 MG/DL (ref 8.5–10.1)
CHLORIDE BLD-SCNC: 106 MMOL/L (ref 94–109)
CLUE CELLS: ABNORMAL
CO2 SERPL-SCNC: 23 MMOL/L (ref 20–32)
COLOR UR AUTO: ABNORMAL
CPB POCT: NO
CREAT SERPL-MCNC: 0.64 MG/DL (ref 0.52–1.04)
EOSINOPHIL # BLD AUTO: 0 10E3/UL (ref 0–0.7)
EOSINOPHIL NFR BLD AUTO: 0 %
ERYTHROCYTE [DISTWIDTH] IN BLOOD BY AUTOMATED COUNT: 15.3 % (ref 10–15)
FETAL RBC % LFV: 0 %
FETAL RBC (ML): 0 ML
FIBRINOGEN PPP-MCNC: 429 MG/DL (ref 170–490)
GFR SERPL CREATININE-BSD FRML MDRD: >90 ML/MIN/1.73M2
GLUCOSE BLD-MCNC: 110 MG/DL (ref 70–99)
GLUCOSE BLD-MCNC: 112 MG/DL (ref 70–99)
GLUCOSE UR STRIP-MCNC: 100 MG/DL
HBA1C MFR BLD: 5 % (ref 0–5.6)
HCG UR QL: POSITIVE
HCO3 BLDV-SCNC: 22 MMOL/L (ref 21–28)
HCT VFR BLD AUTO: 31.8 % (ref 35–47)
HCT VFR BLD CALC: 32 % (ref 35–47)
HGB BLD-MCNC: 10.6 G/DL (ref 11.7–15.7)
HGB BLD-MCNC: 10.9 G/DL (ref 11.7–15.7)
HGB UR QL STRIP: ABNORMAL
HOLD SPECIMEN: NORMAL
IF INDICATED RECOMMENDED DOSE OF RH IMMUNE GLOBULIN UG: 300 UG
IMM GRANULOCYTES # BLD: 0.1 10E3/UL
IMM GRANULOCYTES NFR BLD: 0 %
INR PPP: 1.07 (ref 0.85–1.15)
KETONES UR STRIP-MCNC: 40 MG/DL
LEUKOCYTE ESTERASE UR QL STRIP: ABNORMAL
LYMPHOCYTES # BLD AUTO: 1.4 10E3/UL (ref 0.8–5.3)
LYMPHOCYTES NFR BLD AUTO: 10 %
MCH RBC QN AUTO: 27.8 PG (ref 26.5–33)
MCHC RBC AUTO-ENTMCNC: 33.3 G/DL (ref 31.5–36.5)
MCV RBC AUTO: 84 FL (ref 78–100)
MONOCYTES # BLD AUTO: 0.4 10E3/UL (ref 0–1.3)
MONOCYTES NFR BLD AUTO: 3 %
MUCOUS THREADS #/AREA URNS LPF: PRESENT /LPF
NEUTROPHILS # BLD AUTO: 12.1 10E3/UL (ref 1.6–8.3)
NEUTROPHILS NFR BLD AUTO: 87 %
NITRATE UR QL: POSITIVE
NRBC # BLD AUTO: 0 10E3/UL
NRBC BLD AUTO-RTO: 0 /100
PCO2 BLDV: 41 MM HG (ref 40–50)
PH BLDV: 7.34 [PH] (ref 7.32–7.43)
PH UR STRIP: 8.5 [PH] (ref 5–7)
PLATELET # BLD AUTO: 236 10E3/UL (ref 150–450)
PO2 BLDV: 27 MM HG (ref 25–47)
POTASSIUM BLD-SCNC: 3.8 MMOL/L (ref 3.4–5.3)
POTASSIUM BLD-SCNC: 3.8 MMOL/L (ref 3.4–5.3)
PROT SERPL-MCNC: 8 G/DL (ref 6.8–8.8)
RBC # BLD AUTO: 3.81 10E6/UL (ref 3.8–5.2)
RBC URINE: >182 /HPF
SAO2 % BLDV: 45 % (ref 94–100)
SODIUM BLD-SCNC: 139 MMOL/L (ref 133–144)
SODIUM SERPL-SCNC: 134 MMOL/L (ref 133–144)
SP GR UR STRIP: 1.01 (ref 1–1.03)
TRICHOMONAS, WET PREP: ABNORMAL
TSH SERPL DL<=0.005 MIU/L-ACNC: 1.57 MU/L (ref 0.4–4)
UROBILINOGEN UR STRIP-MCNC: 4 MG/DL
WBC # BLD AUTO: 14 10E3/UL (ref 4–11)
WBC URINE: >182 /HPF
WBC'S/HIGH POWER FIELD, WET PREP: ABNORMAL
YEAST, WET PREP: ABNORMAL

## 2022-11-16 PROCEDURE — 87591 N.GONORRHOEAE DNA AMP PROB: CPT | Performed by: EMERGENCY MEDICINE

## 2022-11-16 PROCEDURE — 999N000285 HC STATISTIC VASC ACCESS LAB DRAW WITH PIV START

## 2022-11-16 PROCEDURE — 99213 OFFICE O/P EST LOW 20 MIN: CPT | Mod: GC | Performed by: OBSTETRICS & GYNECOLOGY

## 2022-11-16 PROCEDURE — 99285 EMERGENCY DEPT VISIT HI MDM: CPT | Mod: 25 | Performed by: EMERGENCY MEDICINE

## 2022-11-16 PROCEDURE — 999N000040 HC STATISTIC CONSULT NO CHARGE VASC ACCESS

## 2022-11-16 PROCEDURE — 85730 THROMBOPLASTIN TIME PARTIAL: CPT | Performed by: STUDENT IN AN ORGANIZED HEALTH CARE EDUCATION/TRAINING PROGRAM

## 2022-11-16 PROCEDURE — 88305 TISSUE EXAM BY PATHOLOGIST: CPT | Mod: TC | Performed by: OBSTETRICS & GYNECOLOGY

## 2022-11-16 PROCEDURE — 96374 THER/PROPH/DIAG INJ IV PUSH: CPT | Performed by: EMERGENCY MEDICINE

## 2022-11-16 PROCEDURE — 86780 TREPONEMA PALLIDUM: CPT | Performed by: STUDENT IN AN ORGANIZED HEALTH CARE EDUCATION/TRAINING PROGRAM

## 2022-11-16 PROCEDURE — 84443 ASSAY THYROID STIM HORMONE: CPT | Performed by: STUDENT IN AN ORGANIZED HEALTH CARE EDUCATION/TRAINING PROGRAM

## 2022-11-16 PROCEDURE — 85610 PROTHROMBIN TIME: CPT | Performed by: STUDENT IN AN ORGANIZED HEALTH CARE EDUCATION/TRAINING PROGRAM

## 2022-11-16 PROCEDURE — 88305 TISSUE EXAM BY PATHOLOGIST: CPT | Mod: 26 | Performed by: PATHOLOGY

## 2022-11-16 PROCEDURE — 82330 ASSAY OF CALCIUM: CPT

## 2022-11-16 PROCEDURE — 84702 CHORIONIC GONADOTROPIN TEST: CPT | Performed by: EMERGENCY MEDICINE

## 2022-11-16 PROCEDURE — 99285 EMERGENCY DEPT VISIT HI MDM: CPT | Performed by: EMERGENCY MEDICINE

## 2022-11-16 PROCEDURE — 87210 SMEAR WET MOUNT SALINE/INK: CPT | Performed by: EMERGENCY MEDICINE

## 2022-11-16 PROCEDURE — 250N000011 HC RX IP 250 OP 636: Performed by: EMERGENCY MEDICINE

## 2022-11-16 PROCEDURE — 87077 CULTURE AEROBIC IDENTIFY: CPT | Performed by: STUDENT IN AN ORGANIZED HEALTH CARE EDUCATION/TRAINING PROGRAM

## 2022-11-16 PROCEDURE — 87070 CULTURE OTHR SPECIMN AEROBIC: CPT | Performed by: STUDENT IN AN ORGANIZED HEALTH CARE EDUCATION/TRAINING PROGRAM

## 2022-11-16 PROCEDURE — 82947 ASSAY GLUCOSE BLOOD QUANT: CPT | Performed by: EMERGENCY MEDICINE

## 2022-11-16 PROCEDURE — 36415 COLL VENOUS BLD VENIPUNCTURE: CPT | Performed by: STUDENT IN AN ORGANIZED HEALTH CARE EDUCATION/TRAINING PROGRAM

## 2022-11-16 PROCEDURE — 82947 ASSAY GLUCOSE BLOOD QUANT: CPT

## 2022-11-16 PROCEDURE — 85384 FIBRINOGEN ACTIVITY: CPT | Performed by: STUDENT IN AN ORGANIZED HEALTH CARE EDUCATION/TRAINING PROGRAM

## 2022-11-16 PROCEDURE — 87389 HIV-1 AG W/HIV-1&-2 AB AG IA: CPT | Performed by: STUDENT IN AN ORGANIZED HEALTH CARE EDUCATION/TRAINING PROGRAM

## 2022-11-16 PROCEDURE — 81025 URINE PREGNANCY TEST: CPT | Performed by: EMERGENCY MEDICINE

## 2022-11-16 PROCEDURE — 81001 URINALYSIS AUTO W/SCOPE: CPT | Performed by: EMERGENCY MEDICINE

## 2022-11-16 PROCEDURE — 999N000127 HC STATISTIC PERIPHERAL IV START W US GUIDANCE

## 2022-11-16 PROCEDURE — 36415 COLL VENOUS BLD VENIPUNCTURE: CPT | Performed by: EMERGENCY MEDICINE

## 2022-11-16 PROCEDURE — 96375 TX/PRO/DX INJ NEW DRUG ADDON: CPT | Performed by: EMERGENCY MEDICINE

## 2022-11-16 PROCEDURE — 85460 HEMOGLOBIN FETAL: CPT | Performed by: STUDENT IN AN ORGANIZED HEALTH CARE EDUCATION/TRAINING PROGRAM

## 2022-11-16 PROCEDURE — 83036 HEMOGLOBIN GLYCOSYLATED A1C: CPT | Performed by: STUDENT IN AN ORGANIZED HEALTH CARE EDUCATION/TRAINING PROGRAM

## 2022-11-16 PROCEDURE — 76801 OB US < 14 WKS SINGLE FETUS: CPT

## 2022-11-16 PROCEDURE — 85025 COMPLETE CBC W/AUTO DIFF WBC: CPT | Performed by: EMERGENCY MEDICINE

## 2022-11-16 PROCEDURE — 87491 CHLMYD TRACH DNA AMP PROBE: CPT | Performed by: EMERGENCY MEDICINE

## 2022-11-16 RX ORDER — MORPHINE SULFATE 4 MG/ML
4 INJECTION, SOLUTION INTRAMUSCULAR; INTRAVENOUS ONCE
Status: COMPLETED | OUTPATIENT
Start: 2022-11-16 | End: 2022-11-16

## 2022-11-16 RX ORDER — KETOROLAC TROMETHAMINE 15 MG/ML
15 INJECTION, SOLUTION INTRAMUSCULAR; INTRAVENOUS ONCE
Status: COMPLETED | OUTPATIENT
Start: 2022-11-16 | End: 2022-11-16

## 2022-11-16 RX ORDER — CEPHALEXIN 500 MG/1
500 CAPSULE ORAL 4 TIMES DAILY
Qty: 28 CAPSULE | Refills: 0 | Status: SHIPPED | OUTPATIENT
Start: 2022-11-16 | End: 2022-11-23

## 2022-11-16 RX ORDER — CEPHALEXIN 500 MG/1
500 CAPSULE ORAL 4 TIMES DAILY
Qty: 40 CAPSULE | Refills: 0 | Status: SHIPPED | OUTPATIENT
Start: 2022-11-16 | End: 2022-11-16

## 2022-11-16 RX ADMIN — KETOROLAC TROMETHAMINE 15 MG: 15 INJECTION, SOLUTION INTRAMUSCULAR; INTRAVENOUS at 15:16

## 2022-11-16 RX ADMIN — MORPHINE SULFATE 4 MG: 4 INJECTION INTRAVENOUS at 17:51

## 2022-11-16 ASSESSMENT — ACTIVITIES OF DAILY LIVING (ADL)
ADLS_ACUITY_SCORE: 35

## 2022-11-16 NOTE — DISCHARGE INSTRUCTIONS
Please make an appointment to follow up with OB/Gyn - [facility] (phone: [phone]) in 2-3 days.  Please return to the emergency department if you have fever, vomiting, uncontrolled pain, heavy bleeding, any other concerns.

## 2022-11-16 NOTE — ED PROVIDER NOTES
"ED Provider Note - MS4  [facility]      History     Chief Complaint   Patient presents with     Vaginal Bleeding     AOG 12 weeks, heavy vag bleed     HPI  Malena Thomas is a 32 year old  female with history of IUFD at 25 weeks 2022 complicated by HELLP syndrome and pulmonary edema requiring brief stay in the ICU who presents with vaginal bleeding.     Patient's last menstrual period was 4 months ago; she has not had any OB/Gyn care or pregnancy test, but estimates she is ~12 weeks pregnant. She has had no abdominal pain, dysuria, or abnormal vaginal discharge since her LMP. Last night, the patient developed cramping suprapubic pain that waxed and waned throughout the night and into this morning. At 12PM today she suddenly developed a \"large amount\" of vaginal bleeding (she cannot further estimate how much she has bled). She was slightly dizzy on ambulation to the ED, but no presyncope/syncope, palpitations, nausea, vomiting, headache. No fevers, chills, chest pain, difficulty breathing.     On my evaluation patient's greatest concern is her pregnancy, she requests an US.  No fevers, chills, chest pain, difficulty breathing recently. No RUQ pain, swelling of hands or feet, visual disturbance.     Of note, patient reports she developed milk production from bilateral breasts 3 days ago. No breast pain or milk production today.    Past Medical History  No past medical history on file.  No past surgical history on file.  acetaminophen (TYLENOL) 325 MG tablet  ferrous sulfate (FEROSUL) 325 (65 Fe) MG tablet  NIFEdipine ER OSMOTIC (ADALAT CC) 60 MG 24 hr tablet  omeprazole (PRILOSEC OTC) 20 MG EC tablet  polyethylene glycol (MIRALAX) 17 GM/Dose powder      No Known Allergies  Family History  No family history on file.  Social History   Social History     Tobacco Use     Smoking status: Never     Smokeless tobacco: Never   Substance Use Topics     Alcohol use: Not Currently     Drug use: " "Never      Past medical history, past surgical history, medications, allergies, family history, and social history were reviewed with the patient. No additional pertinent items.       Review of Systems  A complete review of systems was performed with pertinent positives and negatives noted in the HPI, and all other systems negative.    Physical Exam   BP: 118/70  Pulse: 72  Temp: 98.1  F (36.7  C)  Resp: 16  Height: 157.5 cm (5' 2\")  Weight: 81.6 kg (180 lb)  SpO2: 100 %  Physical Exam  Constitutional:       General: She is not in acute distress.     Appearance: She is not ill-appearing.   HENT:      Head: Normocephalic.      Nose: No rhinorrhea.      Mouth/Throat:      Mouth: Mucous membranes are moist.      Pharynx: Oropharynx is clear.   Eyes:      General: No scleral icterus.     Extraocular Movements: Extraocular movements intact.   Cardiovascular:      Rate and Rhythm: Normal rate and regular rhythm.      Heart sounds: No murmur heard.  Pulmonary:      Effort: Pulmonary effort is normal.      Breath sounds: Normal breath sounds. No wheezing.   Abdominal:      General: Abdomen is flat.      Palpations: Abdomen is soft.      Tenderness: There is no abdominal tenderness. There is no guarding or rebound.   Musculoskeletal:      Cervical back: Neck supple.      Right lower leg: No edema.      Left lower leg: No edema.   Lymphadenopathy:      Cervical: No cervical adenopathy.   Skin:     General: Skin is warm and dry.   Neurological:      Mental Status: She is alert and oriented to person, place, and time.   Psychiatric:         Behavior: Behavior normal.      Comments: Depressed mood. Anxious.       ED Course     ED Course as of 11/16/22 1808   Wed Nov 16, 2022   1433 WBC(!): 14.0   1433 Hemoglobin(!): 10.6  On chart review, this is at baseline.    1433 Platelet Count: 236   1433 Calcium, Ionized Whole Blood POCT: 4.8   1433 Glucose Whole Blood POCT(!): 112   1433 Hemoglobin POCT(!): 10.9   1433 Potassium POCT: 3.8 "   1433 Sodium POCT: 139   1433 pCO2 Venous POCT: 41   1433 pH Venous POCT: 7.34   1433 Bicarbonate Venous POCT: 22   1444 WBC Urine(!): >182   1445 RBC Urine(!): >182   1445 Leukocyte Esterase Urine(!): Large   1445 Nitrite Urine(!): Positive   1445 Urobilinogen mg/dL(!): 4.0   1445 Protein Albumin Urine(!): >=300  Large amount of blood in UA    1445 Blood Urine(!): Large   1445 Ketones Urine(!): 40   1445 Bilirubin Urine(!): Large   1445 Glucose Urine(!): 100   1538 HCG Qual Urine(!): Positive   1648 US OB <14 Weeks w Transvaginal Single  1. No intrauterine pregnancy is identified.   2. The endometrium is mildly thickened and heterogeneous, possibly  related to the presence of blood products. Presence of retained  products of conception is difficult to exclude entirely, however there  are no areas of abnormal blood flow identified within the thickened  endometrium.   1759 INR: 1.07   1759 PTT: 27   1759 Fibrinogen: 429     Procedures: none.          Medications   ketorolac (TORADOL) injection 15 mg (15 mg Intravenous Given 22 1516)   morphine (PF) injection 4 mg (4 mg Intravenous Given 22 1751)        Assessments & Plan (with Medical Decision Making)   Nursing notes and vital signs reviewed.     Presentation, exam, and workup is most consistent with 14 to 15-week miscarriage.    Please see HPI, ROS, exam, and ED course above for pertinent history and findings. In short, the patient is a 32 year old  female with history of IUFD at 25 weeks, HELLP syndrome with subsequent pulmonary edema requiring 2-day ICU stay who presents with abdominal pain and vaginal bleeding in setting of possible late first trimester pregnancy.     Here in the ED, patient is vitally stable and in no acute distress. If pregnancy test is positive, differential includes ruptured ectopic pregnancy, threatened , impending , placental abruption. Will obtain bHCG and CBC to assess hgb. RH+ on chart review, so  rhogam not indicated.     Shortly after arrival, notified by RN that patient is possibly expelling products of conception while providing a urine sample. Examined products, is consistent with approximately 14-week fetus with placenta, though unclear if entire placenta intact. Will consult OB/Gyn regarding testing of products. US also ordered to evaluate for retained products of conception.     UA returned likely contaminated by products of conception/blood, sent for culture.    US returned with mildly thickened and heterogeneous, possibly related to the presence of blood products but no overt flow, lower suspicion for retained products. Seen by and d/w OB/Gyn, feel does not likely warrant a D&C at this time. Patient and  declined genetic testing of the fetus, but were amenable to pathology and cultures of placenta.     OB/GYN is finishing their consultation and examination at the time of signout.  They plan to perform a bedside pelvic exam to ensure the patient is not having any return of any active bleeding that would warrant admission.  Once cleared by OB/GYN, patient can discharge with close outpatient clinic follow-up with OB/GYN.    The patient's care was signed out to Dr. Robins at the end of my shift; please see their note for final diagnosis and disposition.      Final diagnoses:   Miscarriage at 8 to 28 weeks gestation     Skylar La, MS4  [employer] - [facility]     --    ED Attending Physician Attestation    I Flores Auguste MD, cared for this patient with the Medical Student. I performed, or re-performed, the physical exam and medical decision-making. I have verified the accuracy of all the medical student findings and documentation above, and have edited as necessary.     ED Course as of 11/16/22 1809   Wed Nov 16, 2022   1433 WBC(!): 14.0   1433 Hemoglobin(!): 10.6  On chart review, this is at baseline.    1433 Platelet Count: 236   1433 Calcium, Ionized Whole Blood POCT: 4.8    1433 Glucose Whole Blood POCT(!): 112   1433 Hemoglobin POCT(!): 10.9   1433 Potassium POCT: 3.8   1433 Sodium POCT: 139   1433 pCO2 Venous POCT: 41   1433 pH Venous POCT: 7.34   1433 Bicarbonate Venous POCT: 22   1444 WBC Urine(!): >182   1445 RBC Urine(!): >182   1445 Leukocyte Esterase Urine(!): Large   1445 Nitrite Urine(!): Positive   1445 Urobilinogen mg/dL(!): 4.0   1445 Protein Albumin Urine(!): >=300  Large amount of blood in UA    1445 Blood Urine(!): Large   1445 Ketones Urine(!): 40   1445 Bilirubin Urine(!): Large   1445 Glucose Urine(!): 100   1538 HCG Qual Urine(!): Positive   1648 US OB <14 Weeks w Transvaginal Single  1. No intrauterine pregnancy is identified.   2. The endometrium is mildly thickened and heterogeneous, possibly  related to the presence of blood products. Presence of retained  products of conception is difficult to exclude entirely, however there  are no areas of abnormal blood flow identified within the thickened  endometrium.   1759 INR: 1.07   1759 PTT: 27   1759 Fibrinogen: 429      The medical record was reviewed and interpreted.  Current labs reviewed and interpreted.  Previous labs reviewed and interpreted.  Current images reviewed and interpreted: as above.    Flores Auguste MD   [facility] EMERGENCY DEPARTMENT  11/16/2022     Flores Auguste MD  11/16/22 7121

## 2022-11-16 NOTE — LETTER
November 16, 2022      To Whom It May Concern:      Malena Thomas was seen in our Emergency Department today, 11/16/22.  I expect her condition to improve over the next 3-5 days.  She may return to work/school when improved.    Sincerely,        Saira Robins MD

## 2022-11-16 NOTE — LETTER
November 16, 2022      To Whom It May Concern:      Malena Thomas was seen in our Emergency Department today, 11/16/22.  I expect her condition to improve over the next several days.  She may return to work on Monday November 21st.    Sincerely,        Daniel Dominguez MD

## 2022-11-17 LAB
C TRACH DNA SPEC QL PROBE+SIG AMP: NEGATIVE
HIV 1+2 AB+HIV1 P24 AG SERPL QL IA: NONREACTIVE
N GONORRHOEA DNA SPEC QL NAA+PROBE: NEGATIVE
T PALLIDUM AB SER QL: NONREACTIVE

## 2022-11-17 NOTE — RESULT ENCOUNTER NOTE
[facility] Emergency Dept discharge antibiotic prescribed: Cephalexin (Keflex) 500 mg capsule, 1 capsule (500 mg) by mouth 4 times daily for 7 days.  Recommendations in treatment per [facility] ED Lab Result culture protocol

## 2022-11-17 NOTE — RESULT ENCOUNTER NOTE
Final result testing for Syphilis (Treponema pallidum antibody, IgG Serum or Anti-Treponema) is NEGATIVE.    No treatment or change in treatment per [facility] ED Lab Result Syphilis protocol.

## 2022-11-17 NOTE — ED PROVIDER NOTES
Patient was signed out to me by Dr. mercedes.  Please see her note for initial emergency department presentation and emergency department course.  At the time of signout, wet prep was pending.  The wet prep is negative.  Patient was advised to follow-up with OB as an outpatient.  She was given a work note and a prescription for Keflex for urinary tract infection.     Saira Robins MD  11/16/22 1934

## 2022-11-17 NOTE — CONSULTS
Gynecology Consult Note    Consulting Service: ED  Reason for Consult: Miscarriage    HPI:   Malena Thmoas is a 32 year old now  who presented the ED with abdominal pain and vaginal bleeding at ~15wks gestation. Shortly after presentation to the ED she spontaneously passed the fetus and placenta while in the bathroom. GYN was consulted.     Malena reports that she had been feeling well until this morning when she started to have off/on abdominal pain. She had clear vaginal discharge, followed by vaginal bleeding, and came to the ED. Not able to quantify the amount of bleeding she had but reports it was a lot. Her bleeding is currently minimal. Pain moderate. She denies recent illness, F/C, CP, SOB, dysuria, abnormal vaginal discharge, or diarrhea.     Malena estimates that her last menstrual period was sometime in July or August. She has had no prenatal care or US in this pregnancy.    Her most recent pregnancy resulted in an IUFD at 25w2d on 22. In that pregnancy she presented to the ED with PPROM with IUFD on US. She was induced. The cause of the IUFD was thought to be a placental abruption; there were clinical findings on US and pathology findings on the placenta. After delivery she became hypotensive, developed DIC, and was transferred to the MICU where she developed pulmonary edema. She required blood transfusions. In the ICU she developed PreE w/ SF/HELLP. Her workup showed yielded  GBS, cornyebacterium, and staph epidermidis on fetal side of the placenta. APAS labs were negative. She did not have genetics or autopsy. She had one follow-up visit after discharge but was then lost to follow-up. She also had no prenatal care in this pregnancy.    ROS:   Negative except as per HPI    OB/GYN History:       PMH:   Denies    PSHx:   Denies    Medications:   No home medications    Allergies:   No Known Allergies    Social History:   Social History     Tobacco Use     Smoking status: Never     Smokeless  "tobacco: Never   Substance Use Topics     Alcohol use: Not Currently     Drug use: Never     Physical Exam:   Patient Vitals for the past 24 hrs:   BP Temp Temp src Pulse Resp SpO2 Height Weight   11/16/22 1327 118/70 98.1  F (36.7  C) Oral 72 16 100 % 1.575 m (5' 2\") 81.6 kg (180 lb)     Gen:  Resting comfortably, NAD  CV:  Well perfused  Pulm:  NWOB  Abd:  Soft, mildly ttp lower abdomen  Ext:  Non-tender, no LE edema b/l    Exam of Fetus:  External examination reveals a well-developed  infant. The features of the infant were without syndromic appearance. External anatomy was within normal limits. The mouth was patent. The infant has two eyes with orbits, a small anteverted nose with patent nares, and a normal chin. The head is grossly unremarkable with normally formed ears. The thorax is grossly unremarkable. There are five digits on all extremities. The anus is patent. There are no abnormal joint contractures. There is an attached pink-white cord that appears normal in caliber and length. Placenta normal appearing. Small clot on placenta.     Labs:  Results for orders placed or performed during the hospital encounter of 11/16/22   US OB <14 Weeks w Transvaginal Single     Status: None (Preliminary result)    Narrative    US OB <14 WEEKS WITH TRANSVAGINAL SINGLE 11/16/2022 4:30 PM    CLINICAL HISTORY: Vaginal bleeding during early pregnancy. Evaluate  for retained products of conception.    TECHNIQUE: Transabdominal scans were performed. Endovaginal ultrasound  was performed to better visualize the embryo.    COMPARISON: None.    FINDINGS: No intrauterine gestational sac is identified. No embryonic  pole or embryonic cardiac activity is identified. The endometrial  stripe appears mildly thickened and heterogeneous, measuring  approximately 1.5 cm. No areas of abnormal blood flow are identified  within the endometrium. The ovaries are unremarkable. Blood flow is  identified within both ovaries. No adnexal masses " are identified.  Small amount of anechoic free fluid within the pelvis.      Impression    IMPRESSION:   1. No intrauterine pregnancy is identified.   2. The endometrium is mildly thickened and heterogeneous, possibly  related to the presence of blood products. Presence of retained  products of conception is difficult to exclude entirely, however there  are no areas of abnormal blood flow identified within the thickened  endometrium.   HCG qualitative urine     Status: Abnormal   Result Value Ref Range    hCG Urine Qualitative Positive (A) Negative   UA reflex to Microscopic     Status: Abnormal   Result Value Ref Range    Color Urine Red (A) Colorless, Straw, Light Yellow, Yellow    Appearance Urine Turbid (A) Clear    Glucose Urine 100 (A) Negative mg/dL    Bilirubin Urine Large (A) Negative    Ketones Urine 40 (A) Negative mg/dL    Specific Gravity Urine 1.015 1.003 - 1.035    Blood Urine Large (A) Negative    pH Urine 8.5 (H) 5.0 - 7.0    Protein Albumin Urine >=300 (A) Negative mg/dL    Urobilinogen Urine 4.0 (A) 0.2, 1.0 mg/dL    Nitrite Urine Positive (A) Negative    Leukocyte Esterase Urine Large (A) Negative    RBC Urine >182 (H) <=2 /HPF    WBC Urine >182 (H) <=5 /HPF    Mucus Urine Present (A) None Seen /LPF   Comprehensive metabolic panel     Status: Abnormal   Result Value Ref Range    Sodium 134 133 - 144 mmol/L    Potassium 3.8 3.4 - 5.3 mmol/L    Chloride 106 94 - 109 mmol/L    Carbon Dioxide (CO2) 23 20 - 32 mmol/L    Anion Gap 5 3 - 14 mmol/L    Urea Nitrogen 9 7 - 30 mg/dL    Creatinine 0.64 0.52 - 1.04 mg/dL    Calcium 8.7 8.5 - 10.1 mg/dL    Glucose 110 (H) 70 - 99 mg/dL    Alkaline Phosphatase 75 40 - 150 U/L    AST 18 0 - 45 U/L    ALT 13 0 - 50 U/L    Protein Total 8.0 6.8 - 8.8 g/dL    Albumin 3.0 (L) 3.4 - 5.0 g/dL    Bilirubin Total 0.4 0.2 - 1.3 mg/dL    GFR Estimate >90 >60 mL/min/1.73m2   HCG quantitative pregnancy (blood)     Status: Abnormal   Result Value Ref Range    hCG  Quantitative 2,075 (H) 0 - 5 IU/L   CBC with platelets and differential     Status: Abnormal   Result Value Ref Range    WBC Count 14.0 (H) 4.0 - 11.0 10e3/uL    RBC Count 3.81 3.80 - 5.20 10e6/uL    Hemoglobin 10.6 (L) 11.7 - 15.7 g/dL    Hematocrit 31.8 (L) 35.0 - 47.0 %    MCV 84 78 - 100 fL    MCH 27.8 26.5 - 33.0 pg    MCHC 33.3 31.5 - 36.5 g/dL    RDW 15.3 (H) 10.0 - 15.0 %    Platelet Count 236 150 - 450 10e3/uL    % Neutrophils 87 %    % Lymphocytes 10 %    % Monocytes 3 %    % Eosinophils 0 %    % Basophils 0 %    % Immature Granulocytes 0 %    NRBCs per 100 WBC 0 <1 /100    Absolute Neutrophils 12.1 (H) 1.6 - 8.3 10e3/uL    Absolute Lymphocytes 1.4 0.8 - 5.3 10e3/uL    Absolute Monocytes 0.4 0.0 - 1.3 10e3/uL    Absolute Eosinophils 0.0 0.0 - 0.7 10e3/uL    Absolute Basophils 0.0 0.0 - 0.2 10e3/uL    Absolute Immature Granulocytes 0.1 <=0.4 10e3/uL    Absolute NRBCs 0.0 10e3/uL   iStat Gases Electrolytes ICA Glucose Venous, POCT     Status: Abnormal   Result Value Ref Range    CPB Applied No     Hematocrit POCT 32 (L) 35 - 47 %    Calcium, Ionized Whole Blood POCT 4.8 4.4 - 5.2 mg/dL    Glucose Whole Blood POCT 112 (H) 70 - 99 mg/dL    Bicarbonate Venous POCT 22 21 - 28 mmol/L    Hemoglobin POCT 10.9 (L) 11.7 - 15.7 g/dL    Potassium POCT 3.8 3.4 - 5.3 mmol/L    Sodium POCT 139 133 - 144 mmol/L    pCO2 Venous POCT 41 40 - 50 mm Hg    pO2 Venous POCT 27 25 - 47 mm Hg    pH Venous POCT 7.34 7.32 - 7.43    O2 Sat, Venous POCT 45 (L) 94 - 100 %   INR     Status: Normal   Result Value Ref Range    INR 1.07 0.85 - 1.15   Partial thromboplastin time     Status: Normal   Result Value Ref Range    aPTT 27 22 - 38 Seconds   Fibrinogen activity     Status: Normal   Result Value Ref Range    Fibrinogen Activity 429 170 - 490 mg/dL   TSH with free T4 reflex     Status: Normal   Result Value Ref Range    TSH 1.57 0.40 - 4.00 mU/L   Hemoglobin A1c     Status: Normal   Result Value Ref Range    Hemoglobin A1C 5.0 0.0 -  5.6 %   Extra Tube     Status: None    Narrative    The following orders were created for panel order Extra Tube.  Procedure                               Abnormality         Status                     ---------                               -----------         ------                     Extra Red Top Tube[[id number]]                               Final result                 Please view results for these tests on the individual orders.   Extra Red Top Tube     Status: None   Result Value Ref Range    Hold Specimen JI    Wet prep     Status: Abnormal    Specimen: Vagina; Swab   Result Value Ref Range    Trichomonas Absent Absent    Yeast Absent Absent    Clue Cells Absent Absent    WBCs/high power field 1+ (A) None   Placenta Aerobic Bacterial Culture Routine with Gram Stain     Status: Abnormal (Preliminary result)    Specimen: Placenta, Fetal Side   Result Value Ref Range    Gram Stain Result 2+ Gram positive bacilli (A)     Gram Stain Result 3+ WBC seen (A)     Narrative    This specimen was received on a swab. Results may not be optimal. For maximum sensitivity of detection submit tissue, fluid or fine needle aspirate.         Placenta Aerobic Bacterial Culture Routine with Gram Stain     Status: Abnormal (Preliminary result)    Specimen: Placenta, Maternal Side   Result Value Ref Range    Gram Stain Result 2+ Gram positive bacilli (A)     Gram Stain Result 3+ WBC seen (A)     Narrative    This specimen was received on a swab. Results may not be optimal. For maximum sensitivity of detection submit tissue, fluid or fine needle aspirate.         CBC with platelets differential     Status: Abnormal    Narrative    The following orders were created for panel order CBC with platelets differential.  Procedure                               Abnormality         Status                     ---------                               -----------         ------                     CBC with platelets and d...[[id number]]  Abnormal             Final result                 Please view results for these tests on the individual orders.   Pregnancy and [place] Loss Exam     Status: None ()    Narrative    The following orders were created for panel order Pregnancy and [place] Loss Exam.  Procedure                               Abnormality         Status                     ---------                               -----------         ------                     Surgical Pathology Exam[[id number]]                                                       Please view results for these tests on the individual orders.     Imaging:  Pelvic US 22:  IMPRESSION:   1. No intrauterine pregnancy is identified.   2. The endometrium is mildly thickened and heterogeneous, possibly  related to the presence of blood products. Presence of retained  products of conception is difficult to exclude entirely, however there  are no areas of abnormal blood flow identified within the thickened  endometrium.    A&P:   Malena Thomas is a 32 year old  who presented to the ED with pelvic pain and bleeding and subsequently had a miscarriage, passing a fetus and placenta. Gestational age ~15wks by vague LMP. She is hemodynamically stable and does not show signs of infection. Her bleeding is scant. She has not had prenatal care in this pregnancy. She has a history of an IUFD at 25w2d presumed due to placental abruption, complicated by hemorrhage, DIC, PreE w/ SF/HELLP, pulmonary edema, and MICU admission.    #Miscarriage  - Condolences offered. Discussed that cause of miscarriage unknown, and testing may not provide answers. Also discussed that cause of miscarriage may be due to cervical insufficiency, but unable to say with certainty to due no prenatal US  - Workup:    - TSH, a1c, HIV, RPR, UA/Cx, KB coags, placental cultures (fetal, maternal), wet prep, GC/CT   - Placenta to path   - Fetus to path for external and internal exam (autopspy) - consent signed   - Declines  genetic exam   - Prior APAS labs negative   - Rh pos, Rhogam not indicated  - Desires hospital disposition of remains  - She will be contacted to schedule follow-up in OB/GYN clinic  - Referral to [facility] for preconception counseling. She will need cervical length measurements in next pregnancy  - She is hemodynamically stable and has minimal bleeding. US shows likely clot in the uterus but no evidence of rPOC, and full placenta/fetus accounted for on gross examination. From a gyn perspective she is stable for discharge. Discussed return precautions including heavy bleeding.     Thank you for this consult.  Please do not hesitate to contact us with concerns or questions.       Patient seen and discussed with Dr. Finch.    Uday Dominguez MD MPH  OB/Gyn PGY-4  11/16/22 8:12 PM

## 2022-11-17 NOTE — RESULT ENCOUNTER NOTE
Final result for both N. Gonorrhoeae PCR and Chlamydia Trachomatis PCR are NEGATIVE.  No treatment or change in treatment per [facility] ED Lab Result N. Gonorrhea AND/OR Chlamydia T. protocol.

## 2022-11-18 DIAGNOSIS — N96 RECURRENT PREGNANCY LOSS: Primary | ICD-10-CM

## 2022-11-18 LAB
BACTERIA PLACENTA AEROBE CULT: ABNORMAL
BACTERIA PLACENTA AEROBE CULT: ABNORMAL
GRAM STAIN RESULT: ABNORMAL
GRAM STAIN RESULT: ABNORMAL

## 2022-11-23 LAB
PATH REPORT.COMMENTS IMP SPEC: NORMAL
PATH REPORT.COMMENTS IMP SPEC: NORMAL
PATH REPORT.FINAL DX SPEC: NORMAL
PATH REPORT.GROSS SPEC: NORMAL
PATH REPORT.MICROSCOPIC SPEC OTHER STN: NORMAL
PATH REPORT.RELEVANT HX SPEC: NORMAL
PHOTO IMAGE: NORMAL

## 2022-12-08 NOTE — PROGRESS NOTES
GYN Clinic Visit    Date of visit: 2022     Chief Complaint:   Chief Complaint   Patient presents with     Follow Up       HPI:   Malena Thomas is a 32 year old  who presents to clinic today to discuss her history of second trimester pregnancy loss x 2.      2022:  IUFD measuring 25w2d.  Vaginal bleeding, placental abruption, pre-e with SF--> HELLP syndrome --> DIC, pulmonary edema, ICU admission.    2022:  SAB measuring ~15w on path exam.  Unknown if demised before/after delivery.  Delivered in toilet in ED.  Had not received prenatal care.  US in ED obtained after delivery of fetus and placenta.  Vitals were normal.  KB neg.  Placenta GBS pos and WBC 14.0    MFM pre-conception consult scheduled 3/2/23    No bleeding for last week.  No pain after discharge from emergency room.  Has had intercourse one time since pregnancy loss in November.  No pain or abnormal uterine bleeding following.     Does desire pregnancy.    Medications:  acetaminophen (TYLENOL) 325 MG tablet, Take 1-2 tablets (325-650 mg) by mouth every 6 hours as needed for mild pain (Patient not taking: Reported on 2022)  ferrous sulfate (FEROSUL) 325 (65 Fe) MG tablet, Take 1 tablet (325 mg) by mouth daily (with breakfast) (Patient not taking: Reported on 2022)  NIFEdipine ER OSMOTIC (ADALAT CC) 60 MG 24 hr tablet, Take 1 tablet (60 mg) by mouth daily (Patient not taking: Reported on 2022)  omeprazole (PRILOSEC OTC) 20 MG EC tablet, Take 1 tablet (20 mg) by mouth daily (Patient not taking: Reported on 2022)  polyethylene glycol (MIRALAX) 17 GM/Dose powder, Take 17 g (1 capful) by mouth daily as needed for constipation (Patient not taking: Reported on 2022)    No current facility-administered medications on file prior to visit.      Allergy:  No Known Allergies  Patient denies food, latex or environmental allergies.     Obstetric History:   OB History    Para Term  AB Living   3 1 0 1 2 0   SAB IAB  Ectopic Multiple Live Births   0 0 0 0 0      # Outcome Date GA Lbr Bayron/2nd Weight Sex Delivery Anes PTL Lv   3 AB 22 15w0d    SAB      2  22 25w5d 03:16 / 00:04 0.775 kg (1 lb 11.3 oz) M Vag-Spont IV N FD      Complications: Abruptio Placenta      Name: GEORGE THOMAS-ALIA TALAVERA      Apgar1: 0  Apgar5: 0   1 AB 2018 16w0d    IAB        Past obstetrics complications:  See HPI    Gynecologic History:  Menses: regular, monthly  No LMP recorded.     History reviewed. No pertinent past medical history.    History reviewed. No pertinent surgical history.    Social History:  Alcohol use  Social History    Substance and Sexual Activity      Alcohol use: Not Currently    Tobacco use  History   Smoking Status     Never   Smokeless Tobacco     Never     Drug use  History   Drug Use Unknown     Sexual history  History   Sexual Activity     Sexual activity: Yes     Partners: Male         History reviewed. No pertinent family history.      Review of Systems:  Neg as per HPI      Physical Exam:  Vitals:    22 1010   BP: 109/75   Pulse: 83   SpO2: 98%   Weight: 87.1 kg (192 lb)       General:  Pleasant, in no acute distress.  CV:  Normal pulse.  No cyanosis.  Respiratory:  Breathing comfortably.  Ext:  No gross abnormalities.  Neurological:  Normal mental status.  Gait WNL.  Sensation and strength grossly intact  Psych - Appropriate mood and affect.        Assessment:  Alia Thomas is a 32 year old  who presents with chief complaint   Chief Complaint   Patient presents with     Follow Up       Diagnosis:   1. History of pregnancy loss, not currently pregnant  Based on what we know, suspect two different causes for both pregnancy losses:    2022, w loss:  Placental abruption likely due to pre-eclampsia/HELLP syndrome.    2022, 15w loss:  Unclear if IUFD or  demise, but placental culture positive for GBS.  This could certainly be a cause for spontaneous pregnancy loss.  Did not have  reported abdominal pain/tenderness at the time, normal vitals.  WBC slightly elevated, but this can also be seen in pregnancy.  KB was negative and BP normal, so do not suspect similar cause as first loss.    Discussed the importance of keeping appointment with MFM for preconception consultation, as well as the importance of early prenatal care.  Did not have established care with either of these two previous pregnancies, but discussed early monitoring for the pregnancy and her own health to best support her in having a better pregnancy outcome in the future.    Follow up: for prenatal care and prn.    Isaura Finch MD      Reviewed documentation and labs from April 2022 admission, as well as ED visit in Nov 2022 on the day of encounter.

## 2022-12-09 ENCOUNTER — OFFICE VISIT (OUTPATIENT)
Dept: OBGYN | Facility: CLINIC | Age: 32
End: 2022-12-09

## 2022-12-09 VITALS
DIASTOLIC BLOOD PRESSURE: 75 MMHG | OXYGEN SATURATION: 98 % | BODY MASS INDEX: 35.12 KG/M2 | SYSTOLIC BLOOD PRESSURE: 109 MMHG | WEIGHT: 192 LBS | HEART RATE: 83 BPM

## 2022-12-09 DIAGNOSIS — Z87.59 HISTORY OF PREGNANCY LOSS, NOT CURRENTLY PREGNANT: Primary | ICD-10-CM

## 2022-12-09 PROCEDURE — 99214 OFFICE O/P EST MOD 30 MIN: CPT | Performed by: OBSTETRICS & GYNECOLOGY

## 2023-02-15 ENCOUNTER — TELEPHONE (OUTPATIENT)
Dept: OBGYN | Facility: CLINIC | Age: 33
End: 2023-02-15

## 2023-02-15 ENCOUNTER — PRENATAL OFFICE VISIT (OUTPATIENT)
Dept: NURSING | Facility: CLINIC | Age: 33
End: 2023-02-15

## 2023-02-15 VITALS — BODY MASS INDEX: 35.12 KG/M2 | HEIGHT: 62 IN

## 2023-02-15 DIAGNOSIS — O09.91 SUPERVISION OF HIGH-RISK PREGNANCY, FIRST TRIMESTER: Primary | ICD-10-CM

## 2023-02-15 DIAGNOSIS — Z87.59 HISTORY OF PRE-ECLAMPSIA: ICD-10-CM

## 2023-02-15 DIAGNOSIS — O09.91 ENCOUNTER FOR SUPERVISION OF HIGH RISK PREGNANCY IN FIRST TRIMESTER, ANTEPARTUM: Primary | ICD-10-CM

## 2023-02-15 DIAGNOSIS — Z23 NEED FOR TDAP VACCINATION: ICD-10-CM

## 2023-02-15 PROCEDURE — 99207 PR NO CHARGE NURSE ONLY: CPT

## 2023-02-15 NOTE — PROGRESS NOTES
Important Information for Provider:     New ob nurse intake by phone, fourth pregnancy. Patient's recent SAB( 15 weeks) 11/2022. She had IUFD at 25 weeks 4/22/2022, IAB at 16 weeks 2018  Patient is extremely anxious and wants a ultrasound done right away. She had 1 normal period after SAB, positive home pregnancy test. Ultrasound scheduled for 2/16/2023 with Dr Sutton to call with results. Nob with Dr Fields 3/23/2023   History of preeclampsia second pregnancy, IUFD 4/2022    Prenatal OB Questionnaire  Patient supplied answers from flow sheet for:  Prenatal OB Questionnaire.  Past Medical History  Have you ever recieved care for your mental health? : No  Have you ever been in a major accident or suffered serious trauma?: No  Within the last year, has anyone hit, slapped, kicked or otherwise hurt you?: No  In the last year, has anyone forced you to have sex when you didn't want to?: No    Past Medical History 2   Have you ever received a blood transfusion?: No  Would you accept a blood transfusion if was medically recommended?: Yes  Does anyone in your home smoke?: No   Is your blood type Rh negative?: No  Have you ever ?: No  Have you been hospitalized for a nonsurgical reason excluding normal delivery?: No  Have you ever had an abnormal pap smear?: No    Past Medical History (Continued)  Do you have a history of abnormalities of the uterus?: No  Did your mother take TALON or any other hormones when she was pregnant with you?: No  Do you have any other problems we have not asked about which you feel may be important to this pregnancy?: No                     Allergies as of 2/15/2023:    Allergies as of 02/15/2023     (No Known Allergies)       Current medications are:  Current Outpatient Medications   Medication Sig Dispense Refill     NIFEdipine ER OSMOTIC (ADALAT CC) 60 MG 24 hr tablet Take 1 tablet (60 mg) by mouth daily (Patient not taking: Reported on 12/9/2022) 60 tablet 0         Early ultrasound  screening tool:    Does patient have irregular periods?  No  Did patient use hormonal birth control in the three months prior to positive urine pregnancy test? No  Is the patient breastfeeding?  No  Is the patient 10 weeks or greater at time of education visit?  No          Allergies as of 2/17/2023:    Allergies as of 02/15/2023     (No Known Allergies)       Current medications are:  Current Outpatient Medications   Medication Sig Dispense Refill     NIFEdipine ER OSMOTIC (ADALAT CC) 60 MG 24 hr tablet Take 1 tablet (60 mg) by mouth daily (Patient not taking: Reported on 12/9/2022) 60 tablet 0     Prenatal Vit-Fe Fumarate-FA (PRENATAL MULTIVITAMIN W/IRON) 27-0.8 MG tablet Take 1 tablet by mouth daily 90 tablet 3     Prenatal Vit-Fe Fumarate-FA (PRENATAL MULTIVITAMIN W/IRON) 27-0.8 MG tablet Take 1 tablet by mouth daily 90 tablet 3

## 2023-02-16 ENCOUNTER — ANCILLARY PROCEDURE (OUTPATIENT)
Dept: ULTRASOUND IMAGING | Facility: CLINIC | Age: 33
End: 2023-02-16
Attending: OBSTETRICS & GYNECOLOGY

## 2023-02-16 ENCOUNTER — VIRTUAL VISIT (OUTPATIENT)
Dept: OBGYN | Facility: CLINIC | Age: 33
End: 2023-02-16

## 2023-02-16 DIAGNOSIS — O09.91 ENCOUNTER FOR SUPERVISION OF HIGH RISK PREGNANCY IN FIRST TRIMESTER, ANTEPARTUM: ICD-10-CM

## 2023-02-16 DIAGNOSIS — O09.291 CURRENT PREGNANCY IN FIRST TRIMESTER WITH HISTORY OF SPONTANEOUS ABORTION DURING PRIOR PREGNANCY: Primary | ICD-10-CM

## 2023-02-16 DIAGNOSIS — Z34.90 EARLY STAGE OF PREGNANCY: ICD-10-CM

## 2023-02-16 PROCEDURE — 76801 OB US < 14 WKS SINGLE FETUS: CPT | Mod: TC | Performed by: RADIOLOGY

## 2023-02-16 PROCEDURE — 99212 OFFICE O/P EST SF 10 MIN: CPT | Mod: 95 | Performed by: OBSTETRICS & GYNECOLOGY

## 2023-02-16 RX ORDER — PRENATAL VIT/IRON FUM/FOLIC AC 27MG-0.8MG
1 TABLET ORAL DAILY
Qty: 90 TABLET | Refills: 3 | Status: ON HOLD | OUTPATIENT
Start: 2023-02-16 | End: 2023-09-10

## 2023-02-16 RX ORDER — PRENATAL VIT/IRON FUM/FOLIC AC 27MG-0.8MG
1 TABLET ORAL DAILY
Qty: 90 TABLET | Refills: 3 | Status: SHIPPED | OUTPATIENT
Start: 2023-02-16

## 2023-02-16 NOTE — PROGRESS NOTES
Telephone-Visit Details    Type of service:  Phone Visit    Phone Start Time (time phone started):     Phone End Time (time phone stopped):     Phone call duration: 13min     Originating Location (pt. Location): Home      Distant Location (provider location):  On-site [facility]     Mode of Communication:  Telephone    Physician has received verbal consent for a Phone Visit from the patient? Yes    Isaura Sutton MD           Malena Thomas is a 32 year old  at 6w3d by LMP who has a phone visit to discuss today's ultrasound. H/o recurrent pregnancy loss    2022:  IUFD measuring 25w2d.  Vaginal bleeding, placental abruption, pre-e with SF--> HELLP syndrome --> DIC, pulmonary edema, ICU admission.     2022:  SAB measuring ~15w on path exam.  Unknown if demised before/after delivery.  Delivered in toilet in ED.  Had not received prenatal care.  US in ED obtained after delivery of fetus and placenta.  Vitals were normal.  KB neg.  Placenta GBS pos and WBC 14.0    OB History    Para Term  AB Living   4 1 0 1 2 0   SAB IAB Ectopic Multiple Live Births   0 0 0 0 0      # Outcome Date GA Lbr Bayron/2nd Weight Sex Delivery Anes PTL Lv   4 Current            3 AB 22 15w0d    SAB      2  22 25w5d 03:16 / 00:04 0.775 kg (1 lb 11.3 oz) M Vag-Spont IV N FD      Complications: Abruptio Placenta      Name: BLANQUITA THOMAS FD      Apgar1: 0  Apgar5: 0   1 AB  16w0d    IAB            Patient's last menstrual period was 2023.   Menses are usually regular, every 28d  No bleeding or pain  No fever or bad smelling discharge      2023:   IMPRESSION: Possible small intrauterine gestational sac measures 1 cm, corresponding to an estimated gestational age of 5 weeks 5 days. No yolk sac or embryo is identified. Ectopic pregnancy is not entirely excluded on the basis of this exam. Clinical/laboratory correlation and close followup ultrasound are recommended to confirm  a viable intrauterine pregnancy.      1. Current pregnancy in first trimester with history of spontaneous  during prior pregnancy  Discussed today's ultrasound shows small gestational sac, may be too early to see embryo.   Recommend repeat ultrasound in 14 or more days to confirm viability  Recommend she give us a call in the meantime if any bleeding or pain.    - US OB Transvaginal Only; Future      Due to language barrier, a phone  was used during the history-taking, exam and subsequent discussion with this patient.    [id number]    Isaura Sutton MD

## 2023-02-17 ENCOUNTER — TELEPHONE (OUTPATIENT)
Dept: OBGYN | Facility: CLINIC | Age: 33
End: 2023-02-17

## 2023-02-17 PROBLEM — Z87.59 HISTORY OF PRE-ECLAMPSIA: Status: ACTIVE | Noted: 2023-02-17

## 2023-02-17 PROBLEM — Z23 NEED FOR TDAP VACCINATION: Status: ACTIVE | Noted: 2023-02-17

## 2023-02-17 NOTE — TELEPHONE ENCOUNTER
Called patient and scheduled repeat ultrasound for 3/01, with Dr Fields to call with results      Miguelina Espinoza LPN

## 2023-02-27 ENCOUNTER — HOSPITAL ENCOUNTER (EMERGENCY)
Facility: CLINIC | Age: 33
Discharge: LEFT BEFORE DISCHARGE | End: 2023-02-27

## 2023-02-27 ENCOUNTER — NURSE TRIAGE (OUTPATIENT)
Dept: NURSING | Facility: CLINIC | Age: 33
End: 2023-02-27

## 2023-02-27 VITALS
DIASTOLIC BLOOD PRESSURE: 81 MMHG | RESPIRATION RATE: 16 BRPM | BODY MASS INDEX: 35.12 KG/M2 | OXYGEN SATURATION: 100 % | WEIGHT: 192 LBS | TEMPERATURE: 98.2 F | SYSTOLIC BLOOD PRESSURE: 118 MMHG | HEART RATE: 77 BPM

## 2023-02-28 NOTE — TELEPHONE ENCOUNTER
Please call patient. See how bleeding is.   She has not arrived in ER.   She has ultrasound scheduled Wed in [place].   Could do HCG tomorrow, may be an ultrasound opening tomorrow (2/28)

## 2023-02-28 NOTE — TELEPHONE ENCOUNTER
Is about 7 weeks pregnant. This evening saw bright red blood on tissue after wiping.  This is the first time has spotting with this pregnancy,   While talking with writer  is driving her to the [place] ED to be seen.  On 4/22/22 has a fetal death at 25 weeks.    Josefa Maloney RN, MA  [facility] Triage Nurse Advisor      Additional Information    Negative: Shock suspected (e.g., cold/pale/clammy skin, too weak to stand, low BP, rapid pulse)    Negative: Difficult to awaken or acting confused (e.g., disoriented, slurred speech)    Negative: Passed out (i.e., lost consciousness, collapsed and was not responding)    Negative: Sounds like a life-threatening emergency to the triager    Protocols used: PREGNANCY - VAGINAL BLEEDING LESS THAN 20 WEEKS EGA-A-

## 2023-02-28 NOTE — ED NOTES
Patient and spouse upset that they have been waiting for over an hour.  Patient and spouse given reassurance that staff would room them at the soonest availability of an appropriate room.  They requested to speak to charge nurse.  Charge nurse informed and spoke to patient and spouse.

## 2023-02-28 NOTE — ED NOTES
Spouse came to triage stating they were leaving.  He declined to sign declination of medical screening examination form.  Patient and spouse walked outside and came back in stating they were going to stay and wait for an appropriate room.

## 2023-02-28 NOTE — ED TRIAGE NOTES
Triage Assessment     Row Name 02/27/23 [id number]       Triage Assessment (Adult)    Airway WDL WDL       Respiratory WDL    Respiratory WDL WDL       Skin Circulation/Temperature WDL    Skin Circulation/Temperature WDL WDL       Cardiac WDL    Cardiac WDL WDL       Peripheral/Neurovascular WDL    Peripheral Neurovascular WDL WDL       Cognitive/Neuro/Behavioral WDL    Cognitive/Neuro/Behavioral WDL WDL

## 2023-03-01 ENCOUNTER — VIRTUAL VISIT (OUTPATIENT)
Dept: OBGYN | Facility: CLINIC | Age: 33
End: 2023-03-01

## 2023-03-01 ENCOUNTER — ANCILLARY PROCEDURE (OUTPATIENT)
Dept: ULTRASOUND IMAGING | Facility: CLINIC | Age: 33
End: 2023-03-01
Attending: OBSTETRICS & GYNECOLOGY

## 2023-03-01 DIAGNOSIS — O09.291 CURRENT PREGNANCY IN FIRST TRIMESTER WITH HISTORY OF SPONTANEOUS ABORTION DURING PRIOR PREGNANCY: ICD-10-CM

## 2023-03-01 DIAGNOSIS — O20.9 VAGINAL BLEEDING AFFECTING EARLY PREGNANCY: Primary | ICD-10-CM

## 2023-03-01 PROCEDURE — 99213 OFFICE O/P EST LOW 20 MIN: CPT | Mod: 95 | Performed by: OBSTETRICS & GYNECOLOGY

## 2023-03-01 PROCEDURE — 76817 TRANSVAGINAL US OBSTETRIC: CPT | Mod: TC | Performed by: RADIOLOGY

## 2023-03-01 NOTE — PROGRESS NOTES
Phone-Visit Details    Type of service:  Phone Visit    Originating Location (pt. Location): Home    Distant Location (provider location): On-site    Mode of Communication: Telephone visit    Telephone time: 8 min     CC:  F/U viability ultrasound     HPI: Malena Thomas is a 33 YO  who presents at 7w1d by today's ultrasound for a follow up visit for viability. She was seen in the ED  due to spotting but left prior to being evaluated. She reports that her bleeding has since stopped.     O:   Narrative & Impression   US OB TRANSVAGINAL ONLY 3/1/2023 8:48 AM     CLINICAL HISTORY: Current pregnancy in first trimester with history of  spontaneous  during prior pregnancy  TECHNIQUE: Endovaginal ultrasound was performed to better visualize  the embryo.     COMPARISON: 2023     FINDINGS:  UTERUS: Single normal appearing intrauterine gestation sac.  CRL: measures 10 mm, equals 7 weeks 1 day.  FETAL HEART RATE: 138 bpm.  AMNIOTIC FLUID: Normal.  PLACENTA: Not yet formed. Small subchorionic hemorrhage measures 1.6 x  0.9 x 2.4 cm.     RIGHT OVARY: Normal with corpus luteum.  LEFT OVARY: Normal.                                                                      IMPRESSION:   1.  Single living intrauterine gestation at 7 weeks 1 day gestation,  EDC 10/17/2023.  2.  Small subchorionic hemorrhage.            Assessment and Plan:   Maelna Thomas is a 32 year old  who presents with for follow up viability scan due to vaginal bleeding in early pregnancy   (O20.9) Vaginal bleeding affecting early pregnancy  (primary encounter diagnosis)  -WE discussed that her US today showed a viable IUP but that her dates are discordant from her LMP so her EDC was adjusted   -We reviewed that there was a small subchorionic hemorrhage but that these often resolve throughout the first trimester. She should continue to monitor for bleeding and call if she has bleeding similar to a period or heavier otherwise we will plan  to repeat her ultrasound in 2 weeks.     Dispo: RTC in 2 weeks for repeat US     Karma Fields MD

## 2023-03-14 ENCOUNTER — NURSE TRIAGE (OUTPATIENT)
Dept: NURSING | Facility: CLINIC | Age: 33
End: 2023-03-14

## 2023-03-14 ENCOUNTER — HOSPITAL ENCOUNTER (EMERGENCY)
Facility: CLINIC | Age: 33
Discharge: HOME | End: 2023-03-15

## 2023-03-14 VITALS
BODY MASS INDEX: 35.22 KG/M2 | RESPIRATION RATE: 16 BRPM | TEMPERATURE: 98 F | SYSTOLIC BLOOD PRESSURE: 110 MMHG | OXYGEN SATURATION: 100 % | HEART RATE: 78 BPM | WEIGHT: 198.8 LBS | HEIGHT: 63 IN | DIASTOLIC BLOOD PRESSURE: 71 MMHG

## 2023-03-15 NOTE — TELEPHONE ENCOUNTER
"Siltation      Patient calling back after going to the ER yesterday. Patient states they told her to schedule an appointment at her clinic.   The patient states she waited 3 hours and they told me that they did not have room for me. -see note.     background  8 weeks pregnant - no vaginal bleeding no vaginal discharge no cramping   reviewed ED and triage note   vaginal bleeding 3/1     Assessment   Asked 1020 last night  \" a lot bright red blood from her rectum in the toilet\"  Last evening states had a small amount of red blood without a bowel movement when she wiped.  \" moderate amount of blood in the toilet yesterday and Monday evening after a bowel movement.\"   States bowel movement was hard on Monday and normal Tuesday.   No bm Saturday or Sunday.    mild to moderate upper abdomen pain both sides, started last evening- constant     no rectal pain   No fever   No nausea   Has been eating and drinking   no visible Hemorrhoid      Patient states she has had dizziness since yesterday- mild able to tolerate walking around   Advised needs to go back to the ER now for an evaluation, she states she does not want to go back to ER.   advised patient they need ER level of care    Patient declines symptoms severe enough to call 911    Called [facility] - per  Provider the patient needs ER eval due to high risk for miscarriage (last Nov), rectal bleeding and dizziness     writer explained to patient rational for ER per provider, patient agreeable to go to a different er from yesterday.     Patient had a , advised of [facility]  [facility]     Nancy JONES RN   [facility] Triage       "

## 2023-03-15 NOTE — ED TRIAGE NOTES
Patient said she noticed blood in the toilet when she had a bowel movement yesterday evening and today. Patient c/o rectal pain 3 out of 10. She said she's 8 weeks pregnant and she would like to make sure he baby is okay.       Triage Assessment     Row Name 03/14/23 [id number]       Triage Assessment (Adult)    Airway WDL WDL       Respiratory WDL    Respiratory WDL WDL       Skin Circulation/Temperature WDL    Skin Circulation/Temperature WDL WDL       Cardiac WDL    Cardiac WDL WDL       Peripheral/Neurovascular WDL    Peripheral Neurovascular WDL WDL       Cognitive/Neuro/Behavioral WDL    Cognitive/Neuro/Behavioral WDL WDL

## 2023-03-15 NOTE — TELEPHONE ENCOUNTER
"OB Triage Call      Is patient's OB/Midwife with the [facility] or [facility]? [facility]- Proceed with triage     Reason for call: Rectal bleeding    Assessment: Yesterday she started bleeding with stooling.  She also had the same today.  Yesterday she had to strain a lot to have a bowel movement, but not today.   She is passing blood without stool and it is turning the toilet water red.         Plan: ED      9w0d    Estimated Date of Delivery: Oct 17, 2023        OB History    Para Term  AB Living   4 1 0 1 2 0   SAB IAB Ectopic Multiple Live Births   0 0 0 0 0      # Outcome Date GA Lbr Bayron/2nd Weight Sex Delivery Anes PTL Lv   4 Current            3 AB 22 15w0d    SAB      2  22 25w5d 03:16 / 00:04 0.775 kg (1 lb 11.3 oz) M Vag-Spont IV N FD      Complications: Abruptio Placenta      Name: BLANQUITA ASTUDILLO FD      Apgar1: 0  Apgar5: 0   1 AB  16w0d    IAB          No results found for: GBS       Rayne Tapia RN 23 11:05 PM  [facility] Nurse Advisor    Reason for Disposition    [1] MODERATE rectal bleeding (small blood clots, passing blood without stool, or toilet water turns red) AND [2] more than once a day    Additional Information    Negative: Shock suspected (e.g., cold/pale/clammy skin, too weak to stand, low BP, rapid pulse)    Negative: Difficult to awaken or acting confused (e.g., disoriented, slurred speech)    Negative: Passed out (i.e., lost consciousness, collapsed and was not responding)    Negative: [1] Vomiting AND [2] contains red blood or black (\"coffee ground\") material  (Exception: few red streaks in vomit that only happened once)    Negative: Sounds like a life-threatening emergency to the triager    Negative: SEVERE rectal bleeding (large blood clots; constant or on and off bleeding)    Negative: SEVERE dizziness (e.g., unable to stand, requires support to walk, feels like passing out now)    Protocols used: RECTAL " BLEEDING-A-AH

## 2023-03-16 ENCOUNTER — TELEPHONE (OUTPATIENT)
Dept: OBGYN | Facility: CLINIC | Age: 33
End: 2023-03-16

## 2023-03-16 ENCOUNTER — TELEPHONE (OUTPATIENT)
Dept: FAMILY MEDICINE | Facility: CLINIC | Age: 33
End: 2023-03-16

## 2023-03-16 ENCOUNTER — ANCILLARY PROCEDURE (OUTPATIENT)
Dept: ULTRASOUND IMAGING | Facility: CLINIC | Age: 33
End: 2023-03-16
Attending: OBSTETRICS & GYNECOLOGY

## 2023-03-16 DIAGNOSIS — O09.91 ENCOUNTER FOR SUPERVISION OF HIGH RISK PREGNANCY IN FIRST TRIMESTER, ANTEPARTUM: ICD-10-CM

## 2023-03-16 PROCEDURE — 76801 OB US < 14 WKS SINGLE FETUS: CPT | Performed by: OBSTETRICS & GYNECOLOGY

## 2023-03-22 LAB
ABO/RH(D): NORMAL
ANTIBODY SCREEN: NEGATIVE
SPECIMEN EXPIRATION DATE: NORMAL

## 2023-03-23 ENCOUNTER — PRENATAL OFFICE VISIT (OUTPATIENT)
Dept: OBGYN | Facility: CLINIC | Age: 33
End: 2023-03-23
Payer: MEDICAID

## 2023-03-23 VITALS
OXYGEN SATURATION: 100 % | WEIGHT: 196 LBS | HEART RATE: 82 BPM | HEIGHT: 62 IN | BODY MASS INDEX: 36.07 KG/M2 | SYSTOLIC BLOOD PRESSURE: 108 MMHG | DIASTOLIC BLOOD PRESSURE: 71 MMHG

## 2023-03-23 DIAGNOSIS — O09.91 ENCOUNTER FOR SUPERVISION OF HIGH RISK PREGNANCY IN FIRST TRIMESTER, ANTEPARTUM: ICD-10-CM

## 2023-03-23 DIAGNOSIS — O09.291 CURRENT PREGNANCY IN FIRST TRIMESTER WITH HISTORY OF SPONTANEOUS ABORTION DURING PRIOR PREGNANCY: Primary | ICD-10-CM

## 2023-03-23 DIAGNOSIS — O99.011 ANEMIA AFFECTING PREGNANCY IN FIRST TRIMESTER: ICD-10-CM

## 2023-03-23 LAB
ALBUMIN UR-MCNC: NEGATIVE MG/DL
ALT SERPL W P-5'-P-CCNC: 10 U/L (ref 10–35)
APPEARANCE UR: CLEAR
AST SERPL W P-5'-P-CCNC: 16 U/L (ref 10–35)
BILIRUB UR QL STRIP: NEGATIVE
COLOR UR AUTO: YELLOW
CREAT SERPL-MCNC: 0.62 MG/DL (ref 0.51–0.95)
ERYTHROCYTE [DISTWIDTH] IN BLOOD BY AUTOMATED COUNT: 17.7 % (ref 10–15)
GFR SERPL CREATININE-BSD FRML MDRD: >90 ML/MIN/1.73M2
GLUCOSE UR STRIP-MCNC: NEGATIVE MG/DL
HCT VFR BLD AUTO: 32.1 % (ref 35–47)
HGB BLD-MCNC: 10.2 G/DL (ref 11.7–15.7)
HGB UR QL STRIP: NEGATIVE
KETONES UR STRIP-MCNC: NEGATIVE MG/DL
LEUKOCYTE ESTERASE UR QL STRIP: NEGATIVE
MCH RBC QN AUTO: 26.8 PG (ref 26.5–33)
MCHC RBC AUTO-ENTMCNC: 31.8 G/DL (ref 31.5–36.5)
MCV RBC AUTO: 84 FL (ref 78–100)
NITRATE UR QL: NEGATIVE
PH UR STRIP: 7 [PH] (ref 5–7)
PLATELET # BLD AUTO: 201 10E3/UL (ref 150–450)
RBC # BLD AUTO: 3.81 10E6/UL (ref 3.8–5.2)
SP GR UR STRIP: 1.01 (ref 1–1.03)
UROBILINOGEN UR STRIP-ACNC: 0.2 E.U./DL
WBC # BLD AUTO: 6.6 10E3/UL (ref 4–11)

## 2023-03-23 PROCEDURE — 83550 IRON BINDING TEST: CPT | Performed by: OBSTETRICS & GYNECOLOGY

## 2023-03-23 PROCEDURE — 86900 BLOOD TYPING SEROLOGIC ABO: CPT | Performed by: OBSTETRICS & GYNECOLOGY

## 2023-03-23 PROCEDURE — 85027 COMPLETE CBC AUTOMATED: CPT | Performed by: OBSTETRICS & GYNECOLOGY

## 2023-03-23 PROCEDURE — 36415 COLL VENOUS BLD VENIPUNCTURE: CPT | Performed by: OBSTETRICS & GYNECOLOGY

## 2023-03-23 PROCEDURE — 84450 TRANSFERASE (AST) (SGOT): CPT | Performed by: OBSTETRICS & GYNECOLOGY

## 2023-03-23 PROCEDURE — 86901 BLOOD TYPING SEROLOGIC RH(D): CPT | Performed by: OBSTETRICS & GYNECOLOGY

## 2023-03-23 PROCEDURE — 84460 ALANINE AMINO (ALT) (SGPT): CPT | Performed by: OBSTETRICS & GYNECOLOGY

## 2023-03-23 PROCEDURE — 83540 ASSAY OF IRON: CPT | Performed by: OBSTETRICS & GYNECOLOGY

## 2023-03-23 PROCEDURE — 84156 ASSAY OF PROTEIN URINE: CPT | Performed by: OBSTETRICS & GYNECOLOGY

## 2023-03-23 PROCEDURE — 81003 URINALYSIS AUTO W/O SCOPE: CPT | Performed by: OBSTETRICS & GYNECOLOGY

## 2023-03-23 PROCEDURE — 87389 HIV-1 AG W/HIV-1&-2 AB AG IA: CPT | Performed by: OBSTETRICS & GYNECOLOGY

## 2023-03-23 PROCEDURE — 87340 HEPATITIS B SURFACE AG IA: CPT | Performed by: OBSTETRICS & GYNECOLOGY

## 2023-03-23 PROCEDURE — 86780 TREPONEMA PALLIDUM: CPT | Performed by: OBSTETRICS & GYNECOLOGY

## 2023-03-23 PROCEDURE — 86762 RUBELLA ANTIBODY: CPT | Performed by: OBSTETRICS & GYNECOLOGY

## 2023-03-23 PROCEDURE — 86803 HEPATITIS C AB TEST: CPT | Performed by: OBSTETRICS & GYNECOLOGY

## 2023-03-23 PROCEDURE — 86850 RBC ANTIBODY SCREEN: CPT | Performed by: OBSTETRICS & GYNECOLOGY

## 2023-03-23 PROCEDURE — 87086 URINE CULTURE/COLONY COUNT: CPT | Performed by: OBSTETRICS & GYNECOLOGY

## 2023-03-23 PROCEDURE — 99213 OFFICE O/P EST LOW 20 MIN: CPT | Performed by: OBSTETRICS & GYNECOLOGY

## 2023-03-23 PROCEDURE — 82565 ASSAY OF CREATININE: CPT | Performed by: OBSTETRICS & GYNECOLOGY

## 2023-03-23 PROCEDURE — 82728 ASSAY OF FERRITIN: CPT | Performed by: OBSTETRICS & GYNECOLOGY

## 2023-03-24 LAB
ALBUMIN MFR UR ELPH: 8.8 MG/DL (ref 1–14)
CREAT UR-MCNC: 133 MG/DL
HBV SURFACE AG SERPL QL IA: NONREACTIVE
HCV AB SERPL QL IA: NONREACTIVE
HIV 1+2 AB+HIV1 P24 AG SERPL QL IA: NONREACTIVE
PROT/CREAT 24H UR: 0.07 MG/MG CR (ref 0–0.2)
RUBV IGG SERPL QL IA: 2.31 INDEX
RUBV IGG SERPL QL IA: POSITIVE
T PALLIDUM AB SER QL: NONREACTIVE

## 2023-03-25 RX ORDER — MULTIVIT WITH MINERALS/LUTEIN
250 TABLET ORAL DAILY
Qty: 90 TABLET | Refills: 0 | Status: SHIPPED | OUTPATIENT
Start: 2023-03-25 | End: 2023-06-23

## 2023-03-25 RX ORDER — LANOLIN ALCOHOL/MO/W.PET/CERES
1000 CREAM (GRAM) TOPICAL DAILY
Qty: 90 TABLET | Refills: 0 | Status: SHIPPED | OUTPATIENT
Start: 2023-03-25 | End: 2023-06-23

## 2023-03-25 RX ORDER — FERROUS SULFATE 325(65) MG
325 TABLET ORAL
Qty: 90 TABLET | Refills: 0 | Status: SHIPPED | OUTPATIENT
Start: 2023-03-25 | End: 2023-06-23

## 2023-03-26 LAB — BACTERIA UR CULT: NO GROWTH

## 2023-03-26 NOTE — PROGRESS NOTES
OB - New OB History and Physical    HPI: Alia Thomas is a 32 year old  at 10w2d as dated by 7 week US that was not consistent with LMP.   Estimated Date of Delivery: Oct 17, 2023.    This was a planned pregnancy and since her last period she has been having some nausea and fatigue but is overall coping ok with these symptoms she denies vaginal bleeding or significant abdominal pain. She is concerned about the health of this pregnancy due to her history of two prior losses and is wondering what we can do to help keep this from happening again.     Obstetric history:     OB History    Para Term  AB Living   4 1 0 1 2 0   SAB IAB Ectopic Multiple Live Births   0 0 0 0 0      # Outcome Date GA Lbr Bayron/2nd Weight Sex Delivery Anes PTL Lv   4 Current            3 AB 22 15w0d    SAB      2  22 25w5d 03:16 / 00:04 0.775 kg (1 lb 11.3 oz) M Vag-Spont IV N FD      Complications: Abruptio Placenta      Name: BAUDILIO,MALE-ALIA FD      Apgar1: 0  Apgar5: 0   1 AB  16w0d    IAB        She reports a surgical termination around 16 weeks in 2018 which was uncomplicated. With her pregnancy in 2022 she had not had prenatal care and presented to the hospital at 25 weeks (by LMP) with vaginal bleeding and an IUFD was diagnosed and a placental abruption noted on ultrasound. She denies LOF and BRANDON was normal on ultrasound at the time of diagnosis but she did have an elevated WBC count so the team was concerned about PPROM and triple I as a possible etiology for the abruption, she was never febrile. She also was noted to have Pre-eclampsia with SF with multiple severe range blood pressures upon hospital admission which progressed to HELLP syndrome. She had a  of a stillborn infant and developed DIC intrapartum and pulmonary edema after delivery and was briefly transferred to the ICU but was able to be discharged to home on PPD#4, she was seen for a follow up BP check 3 days after  "discharge on 60 mg CR Nifedipine and then was ultimately lost to follow up. Antiphospholipid antibody labs were negative, and she declined an autopsy and genetic testing. The cultura from the fetal side of the placenta did show GBS, cornyebacterium, and staph epidermidis.     She then presented to the ED in 11/2022 at ~15 weeks gestation with watery vaginal discharge and vaginal bleeding and delivered shortly after arriving in the ED. She had not received prenatal care with that pregnancy. She was seen for a follow up visit in the office but did not complete the pre-conception consultation with [facility] prior to becoming pregnant again.      Gynecologic History:   Patient's last menstrual period was 01/02/2023.   Patient denies hx of STIs, has never had a pap smear and declines today.     Allergy: Patient has no known allergies.      Current Medications:  Current Outpatient Medications   Medication     cyanocobalamin (VITAMIN B-12) 1000 MCG tablet     ferrous sulfate (FEROSUL) 325 (65 Fe) MG tablet     vitamin C (ASCORBIC ACID) 250 MG tablet     Prenatal Vit-Fe Fumarate-FA (PRENATAL MULTIVITAMIN W/IRON) 27-0.8 MG tablet     Prenatal Vit-Fe Fumarate-FA (PRENATAL MULTIVITAMIN W/IRON) 27-0.8 MG tablet     No current facility-administered medications for this visit.       Past Medical History:  Past Medical History:   Diagnosis Date     Known health problems: none        Past Surgical History:  Past Surgical History:   Procedure Laterality Date     NO HISTORY OF SURGERY         Social History:  Patient lives with her signficant other (Charles)   Denies drug, alcohol or tobacco use     Family History:  No family history on file.    Review of Systems  See HPI       Physical Exam:  Vitals:    03/23/23 1358   BP: 108/71   Pulse: 82   SpO2: 100%   Weight: 88.9 kg (196 lb)   Height: 1.575 m (5' 2\")     Body mass index is 35.85 kg/m .     General: Patient alert and oriented, no acute distress  CV: no peripheral edema or " cyanosis  Resp: normal respiratory effort and equal lung expansion  Abdomen: non-tender to light and deep palpation, no masses, organomegaly or hernia  : patient declines pelvic exam   Ext: non-tender, no edema      Assessment:  Malena Thomas is a 32 year old  at 10w2d presenting to establish prenatal care.    Problem List:   1. History of 2nd trimester pregnancy loss x2- unclear etiology, with first loss an IUFD was noted likely 2/2 placental abruption but there was also concern for PPROM. With second loss patient presented to the ED with watery discharge and vaginal bleeding and delivered shortly after arrival. MFM consult ordered. We discussed that her history could be consistent with PPROM/cervical insufficiency and MFM may recommend either serial CL ultrasounds or a cerclage but that her history is not as clear cut and we will need to wait for their recommendations.   2. History of Pre-e/HELLP syndrome. No known history of cHTN. APLS panel negative. Baseline labs ordered and will recommend low dose ASA to start at 12 weeks     Plan:  1. MFM referral placed   2. Reviewed routine prenatal care. Discussed MD call schedule as well as role of residents and med students both in clinic and hospital. She declines to have residents/students involved in her care   3. Pap: Has never been done. We discussed the importance of pap smear and STI screening and reccommended that this be done today but patient is concerned about having a pelvic exam during pregnancy and declines. Will discuss at next visit.   4. Diet, Nutrition and Exercise:  Continue PNVs. Continue normal exercise. Her prepregnancy BMI is 35.  According to the WHO guidelines, patient is given a goal of gaining approximately 11-20 pounds during the course of her pregnancy.    5. Immunizations: plan TdaP at 28 weeks  6. Fetal anomaly screening: patient declines and does not want to meet with genetic counseling, will further discuss with MFM     Dispo:  will schedule for MFM consult within 1-2 weeks and plan for close outpatient follow up     MD Karma Felder MD

## 2023-03-27 DIAGNOSIS — Z87.59 HISTORY OF FETAL DEMISE, NOT CURRENTLY PREGNANT: ICD-10-CM

## 2023-03-27 DIAGNOSIS — O09.292: ICD-10-CM

## 2023-03-27 DIAGNOSIS — O09.291: Primary | ICD-10-CM

## 2023-03-27 LAB
FERRITIN SERPL-MCNC: 13 NG/ML (ref 6–175)
IRON BINDING CAPACITY (ROCHE): 369 UG/DL (ref 240–430)
IRON SATN MFR SERPL: 11 % (ref 15–46)
IRON SERPL-MCNC: 41 UG/DL (ref 37–145)

## 2023-03-29 ENCOUNTER — TELEPHONE (OUTPATIENT)
Dept: OBGYN | Facility: CLINIC | Age: 33
End: 2023-03-29

## 2023-03-29 NOTE — CONFIDENTIAL NOTE
Called patient this morning. LVM that she is needing to schedule an appointment with our MFM clinic within the next couple weeks. Gave [facility] phone number for scheduling.    Olivia Tamez RN         Hi,     Can someone follow up in Malena's chart and make sure that she has a MFM consult scheduled in the next 2 weeks. If this hasn't been scheduled yet please contact the MFM clinic and help coordinate. She is very high risk and may need a history indicated cerclage placed so I want to get her in for a consult as soon as possible.     Thank you!   Karma Fields MD

## 2023-03-30 NOTE — TELEPHONE ENCOUNTER
Attempted to call patient to discuss need to schedule with MFM  Unable to be reached at home phone number.   No VM available  Carey Whitten RN on 3/30/2023 at 10:38 AM

## 2023-04-11 ENCOUNTER — APPOINTMENT (OUTPATIENT)
Dept: INTERPRETER SERVICES | Facility: CLINIC | Age: 33
End: 2023-04-11
Payer: MEDICAID

## 2023-04-11 ENCOUNTER — TELEPHONE (OUTPATIENT)
Dept: OBGYN | Facility: CLINIC | Age: 33
End: 2023-04-11
Payer: MEDICAID

## 2023-04-11 NOTE — CONFIDENTIAL NOTE
Letter sent.      Karma Fields MD  You 6 minutes ago (1:28 PM)     KD  Can you please mail a letter letting her know that she needs to schedule this appointment but then I will follow up at her next visit.     Thank you!   Karma Fields MD

## 2023-04-11 NOTE — LETTER
April 11, 2023      Malena Thomas  [address] [place] [address]        Dear Malena,   We have been trying to reach you by phone regarding your Maternal Fetal Medicine consult. We had placed a referral for you to meet with our Maternal Fetal Medicine clinic to discuss your high risk pregnancy.  Please call Maternal Fetal Medicine as soon as possible to get scheduled for your appointments. Their phone number is [phone].  We will plan to follow up with you to be sure you got schedule at your appointment on April 25th.        Sincerely,      Karma Fields MD

## 2023-04-11 NOTE — TELEPHONE ENCOUNTER
Received a call from [facility]. They have not been able to get a hold of the pt and will be cancelling the referral request. If pt does decide to schedule a new one will need to be placed.

## 2023-04-11 NOTE — CONFIDENTIAL NOTE
Tried to call patient to get scheduled with [facility]  Patient phone kept ringing and there was not option to leave a voicemail.  Tried calling her alternative contact - left detailed message on spouses phone that we have been trying to get ahold of Malena and to schedule with [facility].  Gave [facility] phone number to schedule.    Patient has appointment here on 4/25 - not sure if we want to discuss the need to be seen at [facility] then.    Olivia Tamez, RN

## 2023-04-25 ENCOUNTER — TELEPHONE (OUTPATIENT)
Dept: MATERNAL FETAL MEDICINE | Facility: CLINIC | Age: 33
End: 2023-04-25

## 2023-04-25 ENCOUNTER — PRENATAL OFFICE VISIT (OUTPATIENT)
Dept: OBGYN | Facility: CLINIC | Age: 33
End: 2023-04-25
Payer: MEDICAID

## 2023-04-25 VITALS
SYSTOLIC BLOOD PRESSURE: 114 MMHG | HEART RATE: 91 BPM | WEIGHT: 199.9 LBS | DIASTOLIC BLOOD PRESSURE: 60 MMHG | BODY MASS INDEX: 36.56 KG/M2 | TEMPERATURE: 97.8 F

## 2023-04-25 DIAGNOSIS — Z87.59 HISTORY OF FETAL DEMISE, NOT CURRENTLY PREGNANT: ICD-10-CM

## 2023-04-25 DIAGNOSIS — O09.292: ICD-10-CM

## 2023-04-25 DIAGNOSIS — O09.291: ICD-10-CM

## 2023-04-25 DIAGNOSIS — N96 RECURRENT PREGNANCY LOSS: Primary | ICD-10-CM

## 2023-04-25 DIAGNOSIS — O09.92 SUPERVISION OF HIGH RISK PREGNANCY IN SECOND TRIMESTER: Primary | ICD-10-CM

## 2023-04-25 LAB
HGB BLD-MCNC: 11.9 G/DL (ref 11.7–15.7)
HOLD SPECIMEN: NORMAL

## 2023-04-25 PROCEDURE — 36415 COLL VENOUS BLD VENIPUNCTURE: CPT | Performed by: OBSTETRICS & GYNECOLOGY

## 2023-04-25 PROCEDURE — 99207 PR PRENATAL VISIT: CPT | Performed by: OBSTETRICS & GYNECOLOGY

## 2023-04-25 PROCEDURE — 87491 CHLMYD TRACH DNA AMP PROBE: CPT | Performed by: OBSTETRICS & GYNECOLOGY

## 2023-04-25 PROCEDURE — 87591 N.GONORRHOEAE DNA AMP PROB: CPT | Performed by: OBSTETRICS & GYNECOLOGY

## 2023-04-25 RX ORDER — DOCUSATE SODIUM 100 MG/1
100 CAPSULE, LIQUID FILLED ORAL 2 TIMES DAILY PRN
Qty: 90 CAPSULE | Refills: 0 | Status: ON HOLD | OUTPATIENT
Start: 2023-04-25 | End: 2023-09-10

## 2023-04-25 RX ORDER — LANOLIN ALCOHOL/MO/W.PET/CERES
1000 CREAM (GRAM) TOPICAL DAILY
Qty: 90 TABLET | Refills: 0 | Status: SHIPPED | OUTPATIENT
Start: 2023-04-25 | End: 2023-07-24

## 2023-04-25 RX ORDER — MULTIVIT WITH MINERALS/LUTEIN
250 TABLET ORAL DAILY
Qty: 90 TABLET | Refills: 0 | Status: SHIPPED | OUTPATIENT
Start: 2023-04-25 | End: 2023-07-24

## 2023-04-25 RX ORDER — FAMOTIDINE 20 MG/1
20 TABLET, FILM COATED ORAL DAILY
Qty: 90 TABLET | Refills: 1 | Status: SHIPPED | OUTPATIENT
Start: 2023-04-25

## 2023-04-25 RX ORDER — FERROUS SULFATE 325(65) MG
325 TABLET ORAL
Qty: 90 TABLET | Refills: 0 | Status: SHIPPED | OUTPATIENT
Start: 2023-04-25 | End: 2023-07-24

## 2023-04-25 NOTE — TELEPHONE ENCOUNTER
MFM Phone Note:     Briefly discussed patients' history of 16 and 25 week pregnancy losses with Dr. Fields.     Recommended patient come for serial cervical length given potential for cervical insufficiency as a contributor.      Orders placed and MFM schedulers will reach out to patient to schedule in next 1-2 weeks.     Isaura Danielson MD PhD  Department of Obstetrics, Gynecology and Women's Health  Maternal Fetal Medicine Division

## 2023-04-26 LAB
C TRACH DNA SPEC QL NAA+PROBE: NEGATIVE
N GONORRHOEA DNA SPEC QL NAA+PROBE: NEGATIVE

## 2023-04-26 NOTE — PROGRESS NOTES
OB Progress Note    HPI: Malena Thomas reports feeling ok, she is having some issues with constipation and GERD and is wondering what is safe to take during the pregnancy for these things. She also has times where she feels light headed but no CP/SOB or syncopal episodes. She has not yet started the PO iron as the pharmacy was out of it.     We were also unable to reach her to schedule a MFM consult, during our discussion today, she expressed that she was worried that she would be told that there was something wrong with baby and that she was so scared of loosing the pregnancy that she avoided scheduling the appointment but is willing to see MFM and discuss any recommendations due to her prior OB history.     During the patient's prenatal visit, I discussed her OB history with Dr. Danielson and she advised that the patient is not a candidate for a history indicated cerclage but she would recommend serial cervical length ultrasounds starting around 16-17 weeks and the MFM team will reach out to the patient to schedule. Patient's contact number confirmed in the chart.     O:  Vitals:    23 1052   BP: 114/60   Pulse: 91   Temp: 97.8  F (36.6  C)   TempSrc: Temporal   Weight: 90.7 kg (199 lb 14.4 oz)         See OB flow sheet    Assessment and plan:    Malena Thomas is a 32 year old  at 15w1d here for a REBA visit  (O09.92) Supervision of high risk pregnancy in second trimester  (primary encounter diagnosis)  -MFM referral placed again and reviewed with patient plan for CL ultrasounds, number given to patient to schedule with MFM as well   -Discussed low dose ASA due to hx of Pre-e and Rx sent to pharmacy   -Recommended Pepcid for GERD and Colace PRN for constipation   -We discussed that lightheadedness is a common sx in the 2nd trimester due to decrease in blood pressures and that she should continue to stay hydrated and consider compression socks. Repeat Hgb also ordered given hx of anemia.     Plan: [facility] Referral - Pregnancy, aspirin        (ASA) 81 MG EC tablet, famotidine (PEPCID) 20         MG tablet, OB hemoglobin, docusate sodium         (COLACE) 100 MG capsule, ferrous sulfate         (FEROSUL) 325 (65 Fe) MG tablet, vitamin C         (ASCORBIC ACID) 250 MG tablet, cyanocobalamin         (VITAMIN B-12) 1000 MCG tablet, Extra Green Top        Tube (LAB USE ONLY)    Dispo: MFM consult within 1-2 weeks, next prenatal visit in 4 weeks or sooner JERRY Fields MD

## 2023-04-28 ENCOUNTER — APPOINTMENT (OUTPATIENT)
Dept: INTERPRETER SERVICES | Facility: CLINIC | Age: 33
End: 2023-04-28
Payer: MEDICAID

## 2023-04-28 ASSESSMENT — ANXIETY QUESTIONNAIRES
2. NOT BEING ABLE TO STOP OR CONTROL WORRYING: NOT AT ALL
5. BEING SO RESTLESS THAT IT IS HARD TO SIT STILL: NOT AT ALL
IF YOU CHECKED OFF ANY PROBLEMS ON THIS QUESTIONNAIRE, HOW DIFFICULT HAVE THESE PROBLEMS MADE IT FOR YOU TO DO YOUR WORK, TAKE CARE OF THINGS AT HOME, OR GET ALONG WITH OTHER PEOPLE: NOT DIFFICULT AT ALL
3. WORRYING TOO MUCH ABOUT DIFFERENT THINGS: NOT AT ALL
7. FEELING AFRAID AS IF SOMETHING AWFUL MIGHT HAPPEN: NOT AT ALL
GAD7 TOTAL SCORE: 0
1. FEELING NERVOUS, ANXIOUS, OR ON EDGE: NOT AT ALL
6. BECOMING EASILY ANNOYED OR IRRITABLE: NOT AT ALL
GAD7 TOTAL SCORE: 0

## 2023-04-28 ASSESSMENT — PATIENT HEALTH QUESTIONNAIRE - PHQ9
5. POOR APPETITE OR OVEREATING: NOT AT ALL
SUM OF ALL RESPONSES TO PHQ QUESTIONS 1-9: 2

## 2023-05-01 ENCOUNTER — PRE VISIT (OUTPATIENT)
Dept: MATERNAL FETAL MEDICINE | Facility: CLINIC | Age: 33
End: 2023-05-01
Payer: MEDICAID

## 2023-05-04 ENCOUNTER — HOSPITAL ENCOUNTER (OUTPATIENT)
Dept: ULTRASOUND IMAGING | Facility: CLINIC | Age: 33
Discharge: HOME | End: 2023-05-04
Attending: OBSTETRICS & GYNECOLOGY
Payer: MEDICAID

## 2023-05-04 ENCOUNTER — OFFICE VISIT (OUTPATIENT)
Dept: MATERNAL FETAL MEDICINE | Facility: CLINIC | Age: 33
End: 2023-05-04
Attending: OBSTETRICS & GYNECOLOGY
Payer: MEDICAID

## 2023-05-04 DIAGNOSIS — Z87.59 HISTORY OF FETAL DEMISE, NOT CURRENTLY PREGNANT: ICD-10-CM

## 2023-05-04 DIAGNOSIS — N96 RECURRENT PREGNANCY LOSS: ICD-10-CM

## 2023-05-04 DIAGNOSIS — O09.292: Primary | ICD-10-CM

## 2023-05-04 DIAGNOSIS — O09.292: ICD-10-CM

## 2023-05-04 DIAGNOSIS — O09.291: ICD-10-CM

## 2023-05-04 PROCEDURE — 76817 TRANSVAGINAL US OBSTETRIC: CPT

## 2023-05-04 PROCEDURE — 76817 TRANSVAGINAL US OBSTETRIC: CPT | Mod: 26 | Performed by: OBSTETRICS & GYNECOLOGY

## 2023-05-04 NOTE — PROGRESS NOTES
"Please see \"Imaging\" tab under \"Chart Review\" for details of today's US at the [facility].    Donell Shanks MD  Maternal-Fetal Medicine      "

## 2023-05-16 ENCOUNTER — OFFICE VISIT (OUTPATIENT)
Dept: MATERNAL FETAL MEDICINE | Facility: CLINIC | Age: 33
End: 2023-05-16
Attending: OBSTETRICS & GYNECOLOGY
Payer: MEDICAID

## 2023-05-16 ENCOUNTER — HOSPITAL ENCOUNTER (OUTPATIENT)
Dept: ULTRASOUND IMAGING | Facility: CLINIC | Age: 33
Discharge: HOME | End: 2023-05-16
Attending: OBSTETRICS & GYNECOLOGY
Payer: MEDICAID

## 2023-05-16 DIAGNOSIS — O09.522 MULTIGRAVIDA OF ADVANCED MATERNAL AGE IN SECOND TRIMESTER: Primary | ICD-10-CM

## 2023-05-16 DIAGNOSIS — N96 RECURRENT PREGNANCY LOSS: ICD-10-CM

## 2023-05-16 DIAGNOSIS — Z87.59 HISTORY OF FETAL DEMISE, NOT CURRENTLY PREGNANT: ICD-10-CM

## 2023-05-16 DIAGNOSIS — O09.291: ICD-10-CM

## 2023-05-16 DIAGNOSIS — O35.9XX0 SUSPECTED FETAL ANOMALY, ANTEPARTUM, SINGLE OR UNSPECIFIED FETUS: ICD-10-CM

## 2023-05-16 DIAGNOSIS — O09.292: ICD-10-CM

## 2023-05-16 DIAGNOSIS — Z03.73 SUSPECTED FETAL ANOMALY NOT FOUND: ICD-10-CM

## 2023-05-16 PROCEDURE — 76811 OB US DETAILED SNGL FETUS: CPT

## 2023-05-16 PROCEDURE — 76817 TRANSVAGINAL US OBSTETRIC: CPT | Mod: 26 | Performed by: OBSTETRICS & GYNECOLOGY

## 2023-05-16 PROCEDURE — 76811 OB US DETAILED SNGL FETUS: CPT | Mod: 26 | Performed by: OBSTETRICS & GYNECOLOGY

## 2023-05-16 NOTE — PROGRESS NOTES
Please refer to ultrasound report under 'Imaging' Studies of 'Chart Review' tabs.    Oleg Baldwin M.D.

## 2023-05-19 ENCOUNTER — PRENATAL OFFICE VISIT (OUTPATIENT)
Dept: OBGYN | Facility: CLINIC | Age: 33
End: 2023-05-19
Payer: MEDICAID

## 2023-05-19 VITALS
TEMPERATURE: 97.9 F | HEART RATE: 76 BPM | SYSTOLIC BLOOD PRESSURE: 104 MMHG | WEIGHT: 205.4 LBS | BODY MASS INDEX: 37.57 KG/M2 | DIASTOLIC BLOOD PRESSURE: 71 MMHG

## 2023-05-19 DIAGNOSIS — R30.0 DYSURIA: ICD-10-CM

## 2023-05-19 DIAGNOSIS — O09.92 SUPERVISION OF HIGH RISK PREGNANCY IN SECOND TRIMESTER: Primary | ICD-10-CM

## 2023-05-19 LAB
ALBUMIN UR-MCNC: NEGATIVE MG/DL
APPEARANCE UR: CLEAR
BILIRUB UR QL STRIP: NEGATIVE
COLOR UR AUTO: YELLOW
GLUCOSE UR STRIP-MCNC: NEGATIVE MG/DL
HGB UR QL STRIP: NEGATIVE
KETONES UR STRIP-MCNC: NEGATIVE MG/DL
LEUKOCYTE ESTERASE UR QL STRIP: NEGATIVE
NITRATE UR QL: NEGATIVE
PH UR STRIP: 8 [PH] (ref 5–7)
SP GR UR STRIP: 1.01 (ref 1–1.03)
UROBILINOGEN UR STRIP-ACNC: 1 E.U./DL

## 2023-05-19 PROCEDURE — 81003 URINALYSIS AUTO W/O SCOPE: CPT | Performed by: OBSTETRICS & GYNECOLOGY

## 2023-05-19 PROCEDURE — 99207 PR PRENATAL VISIT: CPT | Performed by: OBSTETRICS & GYNECOLOGY

## 2023-05-19 NOTE — PROGRESS NOTES
Return OB visit    Subjective:  Patient not yet feeling FM. Denies vaginal bleeding or abdominal pain. She has had frequency,urgency and dysuria for the past week. No fevers or flank pain. Also with lower back pain and LE edema.      Objective:  /71   Pulse 76   Temp 97.9  F (36.6  C) (Temporal)   Wt 93.2 kg (205 lb 6.4 oz)   LMP 2023   Breastfeeding No   BMI 37.57 kg/m     See OB flow sheet    Assessment and Plan    Malena Thomas is a 32 year old  at 18w3d here for REBA visit, pregnancy complicated by hx of IUFD and mid trimester loss, hx of pre-e    This visit:  -Discussed AFP, patient declines  -UA ordered due to dysuria, reviewed warning signs of pyelonephritis  -Reviewed weight gain in pregnancy (15 lbs so far), patient reports her diet is unchanged and is eating less than pre-pregnancy and she is walking 30 min per day. Will continue to monitor  -Exercises given for low back pain in pregnancy      Next visit:  -Routine PNC     RTC in 4 weeks or sooner JERRY Fields MD

## 2023-05-30 ENCOUNTER — OFFICE VISIT (OUTPATIENT)
Dept: MATERNAL FETAL MEDICINE | Facility: CLINIC | Age: 33
End: 2023-05-30
Attending: OBSTETRICS & GYNECOLOGY
Payer: MEDICAID

## 2023-05-30 ENCOUNTER — HOSPITAL ENCOUNTER (OUTPATIENT)
Dept: ULTRASOUND IMAGING | Facility: CLINIC | Age: 33
Discharge: HOME | End: 2023-05-30
Attending: OBSTETRICS & GYNECOLOGY
Payer: MEDICAID

## 2023-05-30 DIAGNOSIS — Z87.59 HISTORY OF FETAL DEMISE, NOT CURRENTLY PREGNANT: ICD-10-CM

## 2023-05-30 DIAGNOSIS — O09.292: ICD-10-CM

## 2023-05-30 DIAGNOSIS — O09.291: ICD-10-CM

## 2023-05-30 DIAGNOSIS — N96 RECURRENT PREGNANCY LOSS: ICD-10-CM

## 2023-05-30 DIAGNOSIS — Z87.59 HISTORY OF PLACENTA ABRUPTION: Primary | ICD-10-CM

## 2023-05-30 PROCEDURE — 76817 TRANSVAGINAL US OBSTETRIC: CPT | Mod: 26 | Performed by: STUDENT IN AN ORGANIZED HEALTH CARE EDUCATION/TRAINING PROGRAM

## 2023-05-30 PROCEDURE — 76817 TRANSVAGINAL US OBSTETRIC: CPT

## 2023-05-30 NOTE — PROGRESS NOTES
Please see the full imaging report from the [employer] program under the imaging tab.    Siomara Narayan MD  Maternal Fetal Medicine

## 2023-06-04 ENCOUNTER — HEALTH MAINTENANCE LETTER (OUTPATIENT)
Age: 33
End: 2023-06-04

## 2023-06-13 ENCOUNTER — HOSPITAL ENCOUNTER (OUTPATIENT)
Dept: ULTRASOUND IMAGING | Facility: CLINIC | Age: 33
Discharge: HOME | End: 2023-06-13
Attending: OBSTETRICS & GYNECOLOGY
Payer: MEDICAID

## 2023-06-13 ENCOUNTER — OFFICE VISIT (OUTPATIENT)
Dept: MATERNAL FETAL MEDICINE | Facility: CLINIC | Age: 33
End: 2023-06-13
Attending: OBSTETRICS & GYNECOLOGY
Payer: MEDICAID

## 2023-06-13 DIAGNOSIS — Z87.59 HISTORY OF PLACENTA ABRUPTION: ICD-10-CM

## 2023-06-13 DIAGNOSIS — O09.291: Primary | ICD-10-CM

## 2023-06-13 DIAGNOSIS — O35.9XX0 SUSPECTED FETAL ANOMALY, ANTEPARTUM, SINGLE OR UNSPECIFIED FETUS: ICD-10-CM

## 2023-06-13 DIAGNOSIS — Z87.59 HISTORY OF FETAL DEMISE, NOT CURRENTLY PREGNANT: ICD-10-CM

## 2023-06-13 PROCEDURE — 76816 OB US FOLLOW-UP PER FETUS: CPT

## 2023-06-13 PROCEDURE — 76816 OB US FOLLOW-UP PER FETUS: CPT | Mod: 26 | Performed by: STUDENT IN AN ORGANIZED HEALTH CARE EDUCATION/TRAINING PROGRAM

## 2023-06-13 PROCEDURE — 76817 TRANSVAGINAL US OBSTETRIC: CPT | Mod: 26 | Performed by: STUDENT IN AN ORGANIZED HEALTH CARE EDUCATION/TRAINING PROGRAM

## 2023-06-13 NOTE — NURSING NOTE
Patient reports feeling fetal movement, denies pain, contractions, leaking of fluid, or bleeding.  SBAR given to KRISH PETERS, see their note in Epic.

## 2023-06-20 ENCOUNTER — PRENATAL OFFICE VISIT (OUTPATIENT)
Dept: OBGYN | Facility: CLINIC | Age: 33
End: 2023-06-20
Payer: MEDICAID

## 2023-06-20 VITALS
SYSTOLIC BLOOD PRESSURE: 105 MMHG | TEMPERATURE: 98 F | OXYGEN SATURATION: 97 % | HEART RATE: 78 BPM | DIASTOLIC BLOOD PRESSURE: 73 MMHG | WEIGHT: 207.5 LBS | BODY MASS INDEX: 37.95 KG/M2

## 2023-06-20 DIAGNOSIS — O09.299 HISTORY OF HELLP SYNDROME, CURRENTLY PREGNANT: Primary | ICD-10-CM

## 2023-06-20 LAB
ALT SERPL W P-5'-P-CCNC: 10 U/L (ref 0–50)
AST SERPL W P-5'-P-CCNC: 17 U/L (ref 0–45)
CREAT SERPL-MCNC: 0.57 MG/DL (ref 0.51–0.95)
ERYTHROCYTE [DISTWIDTH] IN BLOOD BY AUTOMATED COUNT: 14.6 % (ref 10–15)
GFR SERPL CREATININE-BSD FRML MDRD: >90 ML/MIN/1.73M2
HCT VFR BLD AUTO: 39.1 % (ref 35–47)
HGB BLD-MCNC: 13.4 G/DL (ref 11.7–15.7)
MCH RBC QN AUTO: 31.8 PG (ref 26.5–33)
MCHC RBC AUTO-ENTMCNC: 34.3 G/DL (ref 31.5–36.5)
MCV RBC AUTO: 93 FL (ref 78–100)
PLATELET # BLD AUTO: 184 10E3/UL (ref 150–450)
RBC # BLD AUTO: 4.21 10E6/UL (ref 3.8–5.2)
WBC # BLD AUTO: 10.5 10E3/UL (ref 4–11)

## 2023-06-20 PROCEDURE — 85027 COMPLETE CBC AUTOMATED: CPT | Performed by: OBSTETRICS & GYNECOLOGY

## 2023-06-20 PROCEDURE — 82565 ASSAY OF CREATININE: CPT | Performed by: OBSTETRICS & GYNECOLOGY

## 2023-06-20 PROCEDURE — 84450 TRANSFERASE (AST) (SGOT): CPT | Performed by: OBSTETRICS & GYNECOLOGY

## 2023-06-20 PROCEDURE — 99207 PR PRENATAL VISIT: CPT | Performed by: OBSTETRICS & GYNECOLOGY

## 2023-06-20 PROCEDURE — 84460 ALANINE AMINO (ALT) (SGPT): CPT | Performed by: OBSTETRICS & GYNECOLOGY

## 2023-06-20 PROCEDURE — 36415 COLL VENOUS BLD VENIPUNCTURE: CPT | Performed by: OBSTETRICS & GYNECOLOGY

## 2023-06-21 ENCOUNTER — APPOINTMENT (OUTPATIENT)
Dept: INTERPRETER SERVICES | Facility: CLINIC | Age: 33
End: 2023-06-21
Payer: MEDICAID

## 2023-06-29 NOTE — PROGRESS NOTES
Return OB visit    Subjective:  Patient reports active fetal movement, no vaginal bleeding or leaking fluid. She denies contractions. She has had a persistent headache but no focal neurologic deficits or other pre-e symptoms.        Objective:  /73 (BP Location: Right arm, Patient Position: Sitting, Cuff Size: Adult Large)   Pulse 78   Temp 98  F (36.7  C)   Wt 94.1 kg (207 lb 8 oz)   LMP 2023   SpO2 97%   BMI 37.95 kg/m     See OB flow sheet    Assessment and Plan    Malena Thomas is a 33 year old  at 23w0d here for REBA visit, pregnancy complicated by history of HELLP syndrome and IUFD     This visit:  -Given history of early onset HELLP syndrome with prior pregnancy, labs were drawn today but given normal BP this is unlikely. Reviewed safe options for treating headache in pregnancy and recommended 1000 mg of Tylenol along with lots of PO fluids and a moderate amount of caffeine today (<200 mg). Patient will call if HA not improving.       Next visit:  -GCT and hemoglobin     RTC in 4 weeks or sooner JERRY Fields MD

## 2023-07-18 ENCOUNTER — TELEPHONE (OUTPATIENT)
Dept: NURSING | Facility: CLINIC | Age: 33
End: 2023-07-18

## 2023-07-18 ENCOUNTER — PRENATAL OFFICE VISIT (OUTPATIENT)
Dept: OBGYN | Facility: CLINIC | Age: 33
End: 2023-07-18
Payer: MEDICAID

## 2023-07-18 VITALS
BODY MASS INDEX: 40.11 KG/M2 | SYSTOLIC BLOOD PRESSURE: 123 MMHG | HEART RATE: 82 BPM | TEMPERATURE: 97.8 F | DIASTOLIC BLOOD PRESSURE: 85 MMHG | WEIGHT: 219.3 LBS | OXYGEN SATURATION: 99 %

## 2023-07-18 DIAGNOSIS — O09.92 SUPERVISION OF HIGH RISK PREGNANCY IN SECOND TRIMESTER: Primary | ICD-10-CM

## 2023-07-18 DIAGNOSIS — G56.03 BILATERAL CARPAL TUNNEL SYNDROME: ICD-10-CM

## 2023-07-18 LAB
GLUCOSE 1H P 50 G GLC PO SERPL-MCNC: 123 MG/DL (ref 70–129)
HGB BLD-MCNC: 12.9 G/DL (ref 11.7–15.7)
HOLD SPECIMEN: NORMAL

## 2023-07-18 PROCEDURE — 36415 COLL VENOUS BLD VENIPUNCTURE: CPT | Performed by: OBSTETRICS & GYNECOLOGY

## 2023-07-18 PROCEDURE — 99207 PR PRENATAL VISIT: CPT | Performed by: OBSTETRICS & GYNECOLOGY

## 2023-07-18 PROCEDURE — 82950 GLUCOSE TEST: CPT | Performed by: OBSTETRICS & GYNECOLOGY

## 2023-07-18 NOTE — PROGRESS NOTES
Return OB visit    Subjective:  Patient reports active fetal movement, no vaginal bleeding or leaking fluid. She denies contractions. She is noticing more swelling in her face and hands and had a larger weight gain since last visit four weeks ago (5 kg). No other signs of pre-e. She is having carpal tunnel symptoms and is open to wearing a wrist splint.        Objective:  /85 (BP Location: Left arm, Patient Position: Sitting, Cuff Size: Adult Large)   Pulse 82   Temp 97.8  F (36.6  C)   Wt 99.5 kg (219 lb 4.8 oz)   LMP 2023   SpO2 99%   BMI 40.11 kg/m     See OB flow sheet    Assessment and Plan    Malena Thomas is a 33 year old  at 27w0d here for REBA visit, pregnancy complicated by hx of IUFD, HELLP syndrome and second trimester loss  -Reviewed warning signs of pre-e/HELLP and patient scheduled for close interval follow up visit in one week   -Has not scheduled growth US yet so patient will go to [facility] to schedule prior to leaving   -GTT and ob hemoglobin drawn   -Discussed tdap, will defer to next visit     RTC in 1 week or sooner JERRY Fields MD

## 2023-08-08 ENCOUNTER — HOSPITAL ENCOUNTER (OUTPATIENT)
Dept: ULTRASOUND IMAGING | Facility: CLINIC | Age: 33
Discharge: HOME | End: 2023-08-08
Attending: OBSTETRICS & GYNECOLOGY
Payer: MEDICAID

## 2023-08-08 ENCOUNTER — OFFICE VISIT (OUTPATIENT)
Dept: MATERNAL FETAL MEDICINE | Facility: CLINIC | Age: 33
End: 2023-08-08
Attending: OBSTETRICS & GYNECOLOGY
Payer: MEDICAID

## 2023-08-08 VITALS — SYSTOLIC BLOOD PRESSURE: 118 MMHG | DIASTOLIC BLOOD PRESSURE: 80 MMHG

## 2023-08-08 DIAGNOSIS — O09.291: ICD-10-CM

## 2023-08-08 DIAGNOSIS — O36.5990 FETAL GROWTH RESTRICTION ANTEPARTUM: Primary | ICD-10-CM

## 2023-08-08 DIAGNOSIS — Z87.59 HISTORY OF FETAL DEMISE, NOT CURRENTLY PREGNANT: ICD-10-CM

## 2023-08-08 DIAGNOSIS — Z87.59 HISTORY OF PLACENTA ABRUPTION: ICD-10-CM

## 2023-08-08 DIAGNOSIS — O09.293 HISTORY OF STILLBIRTH IN CURRENTLY PREGNANT PATIENT, THIRD TRIMESTER: ICD-10-CM

## 2023-08-08 PROCEDURE — 76816 OB US FOLLOW-UP PER FETUS: CPT | Mod: 26 | Performed by: OBSTETRICS & GYNECOLOGY

## 2023-08-08 PROCEDURE — 59025 FETAL NON-STRESS TEST: CPT | Mod: 26 | Performed by: OBSTETRICS & GYNECOLOGY

## 2023-08-08 PROCEDURE — 76816 OB US FOLLOW-UP PER FETUS: CPT

## 2023-08-08 PROCEDURE — 99214 OFFICE O/P EST MOD 30 MIN: CPT | Mod: 25 | Performed by: OBSTETRICS & GYNECOLOGY

## 2023-08-08 PROCEDURE — 76820 UMBILICAL ARTERY ECHO: CPT | Mod: 26 | Performed by: OBSTETRICS & GYNECOLOGY

## 2023-08-08 NOTE — PROGRESS NOTES
Please see the imaging tab for details of the ultrasound performed today.    Ava Carney MD  Specialist in Maternal-Fetal Medicine

## 2023-08-11 ENCOUNTER — OFFICE VISIT (OUTPATIENT)
Dept: MATERNAL FETAL MEDICINE | Facility: CLINIC | Age: 33
End: 2023-08-11
Attending: OBSTETRICS & GYNECOLOGY
Payer: MEDICAID

## 2023-08-11 ENCOUNTER — HOSPITAL ENCOUNTER (OUTPATIENT)
Dept: ULTRASOUND IMAGING | Facility: CLINIC | Age: 33
Discharge: HOME | End: 2023-08-11
Attending: OBSTETRICS & GYNECOLOGY
Payer: MEDICAID

## 2023-08-11 ENCOUNTER — TELEPHONE (OUTPATIENT)
Dept: NURSING | Facility: CLINIC | Age: 33
End: 2023-08-11

## 2023-08-11 ENCOUNTER — PRENATAL OFFICE VISIT (OUTPATIENT)
Dept: OBGYN | Facility: CLINIC | Age: 33
End: 2023-08-11
Payer: MEDICAID

## 2023-08-11 VITALS
HEART RATE: 76 BPM | SYSTOLIC BLOOD PRESSURE: 98 MMHG | OXYGEN SATURATION: 96 % | WEIGHT: 222 LBS | BODY MASS INDEX: 40.6 KG/M2 | DIASTOLIC BLOOD PRESSURE: 70 MMHG

## 2023-08-11 DIAGNOSIS — O09.93 SUPERVISION OF HIGH RISK PREGNANCY IN THIRD TRIMESTER: Primary | ICD-10-CM

## 2023-08-11 DIAGNOSIS — O36.5990 PREGNANCY AFFECTED BY FETAL GROWTH RESTRICTION: ICD-10-CM

## 2023-08-11 DIAGNOSIS — O36.5990 FETAL GROWTH RESTRICTION ANTEPARTUM: ICD-10-CM

## 2023-08-11 PROCEDURE — 76820 UMBILICAL ARTERY ECHO: CPT | Mod: 26 | Performed by: OBSTETRICS & GYNECOLOGY

## 2023-08-11 PROCEDURE — 59025 FETAL NON-STRESS TEST: CPT

## 2023-08-11 PROCEDURE — 59025 FETAL NON-STRESS TEST: CPT | Mod: 26 | Performed by: OBSTETRICS & GYNECOLOGY

## 2023-08-11 PROCEDURE — 99207 PR PRENATAL VISIT: CPT

## 2023-08-11 PROCEDURE — 59425 ANTEPARTUM CARE ONLY: CPT

## 2023-08-11 PROCEDURE — 76815 OB US LIMITED FETUS(S): CPT | Mod: 26 | Performed by: OBSTETRICS & GYNECOLOGY

## 2023-08-11 PROCEDURE — 76820 UMBILICAL ARTERY ECHO: CPT

## 2023-08-11 NOTE — NURSING NOTE
NST Performed due to FGR.   reviewed efm tracing. See NST/BPP Doc Flowsheet tab.    Pt reports positive fetal movement, denies ctx, loss of fluid at this time.  Pt instructed to present to 7th floor following MFM appt for OB visit.  Pt states understanding and agrees with plan of care.

## 2023-08-11 NOTE — PROGRESS NOTES
30.3 weeks today here for return OB.  Malena reports her MFM apt was good today, however results not yet available in computer. Declines TDAP. Generally feeling well, denies ctx, bleeding, LOF, +FM. Reports minor HA today, states it is fine, it is minor. Having some stress with navigating work and multiple weekly appts. She reports the type of work she does, she is not able to complete from home. Work note given today for this weeks and future upcoming apts. Discussed if needing help or having difficulty making multiple appts she can contact triage RNS to assist. Has questions about why the placenta is not functioning well, and what she can do to improve its function, and if she can change anything to make her baby grow. Discussed stress reduction, normal diet, the placental function is nothing she did or did not do that could have changed it. Discussed based off of Tuesdays MFM apt the report states delivery would be recommended at the latest 37 weeks or sooner based off any new findings. Malena reports being ok with that. Discussed results of FGR, normal amniotic fluid, normal NST results. Fundal height measuring behind 27cm consistent with ultrasound findings of FGR. Otherwise no new questions today. BP 98/70 normal. Denies vision changes, sob, edema, or any other concerning findings.     Recommend weekly OBGYN with biweekly MFM apts.   AYDIN Phillips CNP    
Color consistent with ethnicity/race, warm, dry intact, resilient.

## 2023-08-14 ENCOUNTER — ANCILLARY PROCEDURE (OUTPATIENT)
Dept: ULTRASOUND IMAGING | Facility: HOSPITAL | Age: 33
End: 2023-08-14
Attending: OBSTETRICS & GYNECOLOGY
Payer: MEDICAID

## 2023-08-14 ENCOUNTER — OFFICE VISIT (OUTPATIENT)
Dept: MATERNAL FETAL MEDICINE | Facility: HOSPITAL | Age: 33
End: 2023-08-14
Attending: OBSTETRICS & GYNECOLOGY
Payer: MEDICAID

## 2023-08-14 DIAGNOSIS — O36.5990 FETAL GROWTH RESTRICTION ANTEPARTUM: ICD-10-CM

## 2023-08-14 PROCEDURE — 59025 FETAL NON-STRESS TEST: CPT

## 2023-08-14 PROCEDURE — 59025 FETAL NON-STRESS TEST: CPT | Mod: 26 | Performed by: OBSTETRICS & GYNECOLOGY

## 2023-08-14 PROCEDURE — 76820 UMBILICAL ARTERY ECHO: CPT | Mod: 26 | Performed by: OBSTETRICS & GYNECOLOGY

## 2023-08-14 PROCEDURE — 99207 PR NO CHARGE LOS: CPT | Performed by: OBSTETRICS & GYNECOLOGY

## 2023-08-14 PROCEDURE — 76815 OB US LIMITED FETUS(S): CPT | Mod: 26 | Performed by: OBSTETRICS & GYNECOLOGY

## 2023-08-14 PROCEDURE — 76820 UMBILICAL ARTERY ECHO: CPT

## 2023-08-14 NOTE — PROGRESS NOTES
"Please see \"Imaging\" tab under \"Chart Review\" for details of today's ultrasound.    Jamie Ceja M.D.  Specialist in Maternal-Fetal Medicine     "

## 2023-08-14 NOTE — NURSING NOTE
Malena Thomas is a  at 30w6d who presents to [facility] for NST and UAR assessment. Pt reports positive fetal movement. Pt denies bldg/lof/change in discharge, contractions, headache, vision changes, chest pain/SOB or edema. SBAR given to Dr. Baldwin, see note in Epic.     NST Performed due to fetal growth restriction.  Dr. Baldwin reviewed efm tracing. See NST/BPP Doc Flowsheet tab.

## 2023-08-15 ENCOUNTER — TELEPHONE (OUTPATIENT)
Dept: OBGYN | Facility: CLINIC | Age: 33
End: 2023-08-15
Payer: MEDICAID

## 2023-08-17 ENCOUNTER — ANCILLARY PROCEDURE (OUTPATIENT)
Dept: ULTRASOUND IMAGING | Facility: HOSPITAL | Age: 33
End: 2023-08-17
Attending: OBSTETRICS & GYNECOLOGY
Payer: MEDICAID

## 2023-08-17 ENCOUNTER — OFFICE VISIT (OUTPATIENT)
Dept: MATERNAL FETAL MEDICINE | Facility: HOSPITAL | Age: 33
End: 2023-08-17
Attending: OBSTETRICS & GYNECOLOGY
Payer: MEDICAID

## 2023-08-17 DIAGNOSIS — O36.5990 FETAL GROWTH RESTRICTION ANTEPARTUM: ICD-10-CM

## 2023-08-17 PROCEDURE — 59025 FETAL NON-STRESS TEST: CPT | Mod: 26 | Performed by: OBSTETRICS & GYNECOLOGY

## 2023-08-17 PROCEDURE — 76815 OB US LIMITED FETUS(S): CPT | Mod: 26 | Performed by: OBSTETRICS & GYNECOLOGY

## 2023-08-17 PROCEDURE — 76820 UMBILICAL ARTERY ECHO: CPT | Mod: 26 | Performed by: OBSTETRICS & GYNECOLOGY

## 2023-08-17 PROCEDURE — 76820 UMBILICAL ARTERY ECHO: CPT

## 2023-08-17 PROCEDURE — 99207 PR NO CHARGE LOS: CPT | Performed by: OBSTETRICS & GYNECOLOGY

## 2023-08-17 PROCEDURE — 59025 FETAL NON-STRESS TEST: CPT

## 2023-08-17 NOTE — NURSING NOTE
Malena Thomas is a  at 31w2d who presents to [facility] for scheduled weekly monitoring. Pt reports positive fetal movement. SBAR given to Dr. Brice, see note in Epic.

## 2023-08-17 NOTE — PROGRESS NOTES
Please see full imaging report from [employer] program under imaging tab.    Lawrence Brice MD  Maternal Fetal Medicine

## 2023-08-22 ENCOUNTER — OFFICE VISIT (OUTPATIENT)
Dept: MATERNAL FETAL MEDICINE | Facility: HOSPITAL | Age: 33
End: 2023-08-22
Attending: OBSTETRICS & GYNECOLOGY
Payer: MEDICAID

## 2023-08-22 ENCOUNTER — ANCILLARY PROCEDURE (OUTPATIENT)
Dept: ULTRASOUND IMAGING | Facility: HOSPITAL | Age: 33
End: 2023-08-22
Attending: OBSTETRICS & GYNECOLOGY
Payer: MEDICAID

## 2023-08-22 DIAGNOSIS — O36.5990 FETAL GROWTH RESTRICTION ANTEPARTUM: Primary | ICD-10-CM

## 2023-08-22 DIAGNOSIS — O36.5990 FETAL GROWTH RESTRICTION ANTEPARTUM: ICD-10-CM

## 2023-08-22 PROCEDURE — 99207 PR NO CHARGE LOS: CPT | Performed by: OBSTETRICS & GYNECOLOGY

## 2023-08-22 PROCEDURE — 59025 FETAL NON-STRESS TEST: CPT

## 2023-08-22 PROCEDURE — 76820 UMBILICAL ARTERY ECHO: CPT

## 2023-08-22 PROCEDURE — 76820 UMBILICAL ARTERY ECHO: CPT | Mod: 26 | Performed by: OBSTETRICS & GYNECOLOGY

## 2023-08-22 PROCEDURE — 76815 OB US LIMITED FETUS(S): CPT | Mod: 26 | Performed by: OBSTETRICS & GYNECOLOGY

## 2023-08-22 PROCEDURE — 59025 FETAL NON-STRESS TEST: CPT | Mod: 26 | Performed by: OBSTETRICS & GYNECOLOGY

## 2023-08-22 NOTE — NURSING NOTE
NST Performed due to FGR.   reviewed efm tracing. See NST/BPP Doc Flowsheet tab.     Rayne Choi RN

## 2023-08-22 NOTE — PROGRESS NOTES
"Please see \"Imaging\" tab under \"Chart Review\" for details of today's visit.    Henry Denney    "

## 2023-08-23 ENCOUNTER — APPOINTMENT (OUTPATIENT)
Dept: INTERPRETER SERVICES | Facility: CLINIC | Age: 33
End: 2023-08-23
Payer: MEDICAID

## 2023-08-25 ENCOUNTER — OFFICE VISIT (OUTPATIENT)
Dept: MATERNAL FETAL MEDICINE | Facility: HOSPITAL | Age: 33
End: 2023-08-25
Attending: STUDENT IN AN ORGANIZED HEALTH CARE EDUCATION/TRAINING PROGRAM
Payer: MEDICAID

## 2023-08-25 ENCOUNTER — ANCILLARY PROCEDURE (OUTPATIENT)
Dept: ULTRASOUND IMAGING | Facility: HOSPITAL | Age: 33
End: 2023-08-25
Attending: STUDENT IN AN ORGANIZED HEALTH CARE EDUCATION/TRAINING PROGRAM
Payer: MEDICAID

## 2023-08-25 DIAGNOSIS — O36.5990 FETAL GROWTH RESTRICTION ANTEPARTUM: ICD-10-CM

## 2023-08-25 PROCEDURE — 76820 UMBILICAL ARTERY ECHO: CPT | Mod: 26 | Performed by: STUDENT IN AN ORGANIZED HEALTH CARE EDUCATION/TRAINING PROGRAM

## 2023-08-25 PROCEDURE — 76815 OB US LIMITED FETUS(S): CPT | Mod: 26 | Performed by: STUDENT IN AN ORGANIZED HEALTH CARE EDUCATION/TRAINING PROGRAM

## 2023-08-25 PROCEDURE — 59025 FETAL NON-STRESS TEST: CPT | Mod: 26 | Performed by: STUDENT IN AN ORGANIZED HEALTH CARE EDUCATION/TRAINING PROGRAM

## 2023-08-25 PROCEDURE — 99207 PR NO CHARGE LOS: CPT | Performed by: STUDENT IN AN ORGANIZED HEALTH CARE EDUCATION/TRAINING PROGRAM

## 2023-08-25 PROCEDURE — 59025 FETAL NON-STRESS TEST: CPT

## 2023-08-25 PROCEDURE — 76815 OB US LIMITED FETUS(S): CPT

## 2023-08-25 NOTE — NURSING NOTE
Malena Thomas is a  at 32w3d who presents to [facility] for a follow-up ultrasound. Pt reports positive fetal movement. Pt denies bldg/lof/change in discharge, contractions, headache, vision changes, chest pain/SOB or edema. Malena reminded to call [facility] to schedule OB visit, phone number provided. SBAR given to Dr. Narayan, see note in Epic.      NST Performed due to FGR.   reviewed efm tracing. See NST/BPP Doc Flowsheet tab.       Rayne Choi RN

## 2023-08-29 ENCOUNTER — OFFICE VISIT (OUTPATIENT)
Dept: MATERNAL FETAL MEDICINE | Facility: HOSPITAL | Age: 33
End: 2023-08-29
Attending: STUDENT IN AN ORGANIZED HEALTH CARE EDUCATION/TRAINING PROGRAM
Payer: MEDICAID

## 2023-08-29 ENCOUNTER — ANCILLARY PROCEDURE (OUTPATIENT)
Dept: ULTRASOUND IMAGING | Facility: HOSPITAL | Age: 33
End: 2023-08-29
Attending: STUDENT IN AN ORGANIZED HEALTH CARE EDUCATION/TRAINING PROGRAM
Payer: MEDICAID

## 2023-08-29 DIAGNOSIS — O36.5990 FETAL GROWTH RESTRICTION ANTEPARTUM: ICD-10-CM

## 2023-08-29 PROCEDURE — 76820 UMBILICAL ARTERY ECHO: CPT | Mod: 26 | Performed by: STUDENT IN AN ORGANIZED HEALTH CARE EDUCATION/TRAINING PROGRAM

## 2023-08-29 PROCEDURE — 76816 OB US FOLLOW-UP PER FETUS: CPT | Mod: 26 | Performed by: STUDENT IN AN ORGANIZED HEALTH CARE EDUCATION/TRAINING PROGRAM

## 2023-08-29 PROCEDURE — 59025 FETAL NON-STRESS TEST: CPT | Mod: 26 | Performed by: STUDENT IN AN ORGANIZED HEALTH CARE EDUCATION/TRAINING PROGRAM

## 2023-08-29 PROCEDURE — 76816 OB US FOLLOW-UP PER FETUS: CPT

## 2023-08-29 PROCEDURE — 59025 FETAL NON-STRESS TEST: CPT

## 2023-08-29 NOTE — NURSING NOTE
Malena Thomas is a  at 33w0d who presents to [facility] for scheduled weekly fetal growth restriction surveillance. Pt reports positive fetal movement. NST completed. SBAR given to Dr. Narayan, see note in Epic.

## 2023-08-31 ENCOUNTER — HOSPITAL ENCOUNTER (INPATIENT)
Facility: CLINIC | Age: 33
LOS: 10 days | Discharge: HOME | End: 2023-09-10
Attending: OBSTETRICS & GYNECOLOGY | Admitting: OBSTETRICS & GYNECOLOGY
Payer: MEDICAID

## 2023-08-31 ENCOUNTER — PRENATAL OFFICE VISIT (OUTPATIENT)
Dept: OBGYN | Facility: CLINIC | Age: 33
End: 2023-08-31
Payer: MEDICAID

## 2023-08-31 VITALS
DIASTOLIC BLOOD PRESSURE: 88 MMHG | TEMPERATURE: 98.4 F | HEART RATE: 84 BPM | SYSTOLIC BLOOD PRESSURE: 132 MMHG | OXYGEN SATURATION: 97 % | WEIGHT: 234.1 LBS | BODY MASS INDEX: 42.82 KG/M2

## 2023-08-31 DIAGNOSIS — O14.13 PREECLAMPSIA, SEVERE, THIRD TRIMESTER: ICD-10-CM

## 2023-08-31 DIAGNOSIS — O09.93 SUPERVISION OF HIGH RISK PREGNANCY IN THIRD TRIMESTER: Primary | ICD-10-CM

## 2023-08-31 DIAGNOSIS — Z98.891 S/P CESAREAN SECTION: Primary | ICD-10-CM

## 2023-08-31 LAB
ABO/RH(D): NORMAL
ALBUMIN MFR UR ELPH: 347.8 MG/DL
ALT SERPL W P-5'-P-CCNC: 9 U/L (ref 0–50)
ALT SERPL W P-5'-P-CCNC: 9 U/L (ref 0–50)
ANTIBODY SCREEN: NEGATIVE
AST SERPL W P-5'-P-CCNC: 20 U/L (ref 0–45)
AST SERPL W P-5'-P-CCNC: 22 U/L (ref 0–45)
CREAT SERPL-MCNC: 0.67 MG/DL (ref 0.51–0.95)
CREAT SERPL-MCNC: 0.68 MG/DL (ref 0.51–0.95)
CREAT UR-MCNC: 233.3 MG/DL
ERYTHROCYTE [DISTWIDTH] IN BLOOD BY AUTOMATED COUNT: 13.8 % (ref 10–15)
ERYTHROCYTE [DISTWIDTH] IN BLOOD BY AUTOMATED COUNT: 14.1 % (ref 10–15)
GFR SERPL CREATININE-BSD FRML MDRD: >90 ML/MIN/1.73M2
GFR SERPL CREATININE-BSD FRML MDRD: >90 ML/MIN/1.73M2
HCT VFR BLD AUTO: 37.6 % (ref 35–47)
HCT VFR BLD AUTO: 41 % (ref 35–47)
HGB BLD-MCNC: 13.1 G/DL (ref 11.7–15.7)
HGB BLD-MCNC: 14.3 G/DL (ref 11.7–15.7)
MCH RBC QN AUTO: 33.6 PG (ref 26.5–33)
MCH RBC QN AUTO: 33.7 PG (ref 26.5–33)
MCHC RBC AUTO-ENTMCNC: 34.8 G/DL (ref 31.5–36.5)
MCHC RBC AUTO-ENTMCNC: 34.9 G/DL (ref 31.5–36.5)
MCV RBC AUTO: 96 FL (ref 78–100)
MCV RBC AUTO: 97 FL (ref 78–100)
PLATELET # BLD AUTO: 150 10E3/UL (ref 150–450)
PLATELET # BLD AUTO: 176 10E3/UL (ref 150–450)
PROT/CREAT 24H UR: 1.49 MG/MG CR (ref 0–0.2)
RBC # BLD AUTO: 3.9 10E6/UL (ref 3.8–5.2)
RBC # BLD AUTO: 4.24 10E6/UL (ref 3.8–5.2)
SPECIMEN EXPIRATION DATE: NORMAL
WBC # BLD AUTO: 16.8 10E3/UL (ref 4–11)
WBC # BLD AUTO: 9.9 10E3/UL (ref 4–11)

## 2023-08-31 PROCEDURE — 120N000002 HC R&B MED SURG/OB UMMC

## 2023-08-31 PROCEDURE — 36415 COLL VENOUS BLD VENIPUNCTURE: CPT

## 2023-08-31 PROCEDURE — 87653 STREP B DNA AMP PROBE: CPT

## 2023-08-31 PROCEDURE — 86780 TREPONEMA PALLIDUM: CPT | Performed by: OBSTETRICS & GYNECOLOGY

## 2023-08-31 PROCEDURE — 85014 HEMATOCRIT: CPT

## 2023-08-31 PROCEDURE — 250N000013 HC RX MED GY IP 250 OP 250 PS 637

## 2023-08-31 PROCEDURE — 84460 ALANINE AMINO (ALT) (SGPT): CPT | Performed by: OBSTETRICS & GYNECOLOGY

## 2023-08-31 PROCEDURE — 82565 ASSAY OF CREATININE: CPT | Performed by: OBSTETRICS & GYNECOLOGY

## 2023-08-31 PROCEDURE — G0463 HOSPITAL OUTPT CLINIC VISIT: HCPCS

## 2023-08-31 PROCEDURE — 59025 FETAL NON-STRESS TEST: CPT | Mod: 26 | Performed by: OBSTETRICS & GYNECOLOGY

## 2023-08-31 PROCEDURE — 36415 COLL VENOUS BLD VENIPUNCTURE: CPT | Performed by: OBSTETRICS & GYNECOLOGY

## 2023-08-31 PROCEDURE — 84460 ALANINE AMINO (ALT) (SGPT): CPT

## 2023-08-31 PROCEDURE — 99207 PR PRENATAL VISIT: CPT | Performed by: OBSTETRICS & GYNECOLOGY

## 2023-08-31 PROCEDURE — 99222 1ST HOSP IP/OBS MODERATE 55: CPT | Mod: 25 | Performed by: OBSTETRICS & GYNECOLOGY

## 2023-08-31 PROCEDURE — 86901 BLOOD TYPING SEROLOGIC RH(D): CPT | Performed by: OBSTETRICS & GYNECOLOGY

## 2023-08-31 PROCEDURE — 84450 TRANSFERASE (AST) (SGOT): CPT

## 2023-08-31 PROCEDURE — 99232 SBSQ HOSP IP/OBS MODERATE 35: CPT

## 2023-08-31 PROCEDURE — 86850 RBC ANTIBODY SCREEN: CPT | Performed by: OBSTETRICS & GYNECOLOGY

## 2023-08-31 PROCEDURE — 82565 ASSAY OF CREATININE: CPT

## 2023-08-31 PROCEDURE — 84156 ASSAY OF PROTEIN URINE: CPT

## 2023-08-31 PROCEDURE — 84450 TRANSFERASE (AST) (SGOT): CPT | Performed by: OBSTETRICS & GYNECOLOGY

## 2023-08-31 PROCEDURE — 258N000003 HC RX IP 258 OP 636

## 2023-08-31 PROCEDURE — 250N000011 HC RX IP 250 OP 636

## 2023-08-31 PROCEDURE — 85027 COMPLETE CBC AUTOMATED: CPT | Performed by: OBSTETRICS & GYNECOLOGY

## 2023-08-31 RX ORDER — BETAMETHASONE SODIUM PHOSPHATE AND BETAMETHASONE ACETATE 3; 3 MG/ML; MG/ML
12 INJECTION, SUSPENSION INTRA-ARTICULAR; INTRALESIONAL; INTRAMUSCULAR; SOFT TISSUE EVERY 24 HOURS
Status: COMPLETED | OUTPATIENT
Start: 2023-08-31 | End: 2023-09-01

## 2023-08-31 RX ORDER — SIMETHICONE 80 MG
160 TABLET,CHEWABLE ORAL EVERY 4 HOURS PRN
Status: DISCONTINUED | OUTPATIENT
Start: 2023-08-31 | End: 2023-09-06

## 2023-08-31 RX ORDER — LABETALOL HYDROCHLORIDE 5 MG/ML
20-80 INJECTION, SOLUTION INTRAVENOUS EVERY 10 MIN PRN
Status: DISCONTINUED | OUTPATIENT
Start: 2023-08-31 | End: 2023-09-10 | Stop reason: HOSPADM

## 2023-08-31 RX ORDER — PROCHLORPERAZINE MALEATE 10 MG
10 TABLET ORAL EVERY 6 HOURS PRN
Status: DISCONTINUED | OUTPATIENT
Start: 2023-08-31 | End: 2023-09-06

## 2023-08-31 RX ORDER — MAGNESIUM SULFATE HEPTAHYDRATE 40 MG/ML
4 INJECTION, SOLUTION INTRAVENOUS ONCE
Status: DISCONTINUED | OUTPATIENT
Start: 2023-08-31 | End: 2023-09-10 | Stop reason: HOSPADM

## 2023-08-31 RX ORDER — NIFEDIPINE 10 MG/1
CAPSULE ORAL
Status: COMPLETED
Start: 2023-08-31 | End: 2023-08-31

## 2023-08-31 RX ORDER — POLYETHYLENE GLYCOL 3350 17 G/17G
17 POWDER, FOR SOLUTION ORAL DAILY
Status: DISCONTINUED | OUTPATIENT
Start: 2023-08-31 | End: 2023-09-06

## 2023-08-31 RX ORDER — MAGNESIUM HYDROXIDE/ALUMINUM HYDROXICE/SIMETHICONE 120; 1200; 1200 MG/30ML; MG/30ML; MG/30ML
30 SUSPENSION ORAL
Status: COMPLETED | OUTPATIENT
Start: 2023-08-31 | End: 2023-09-03

## 2023-08-31 RX ORDER — METOCLOPRAMIDE 10 MG/1
10 TABLET ORAL EVERY 6 HOURS PRN
Status: DISCONTINUED | OUTPATIENT
Start: 2023-08-31 | End: 2023-09-06

## 2023-08-31 RX ORDER — LIDOCAINE 40 MG/G
CREAM TOPICAL
Status: DISCONTINUED | OUTPATIENT
Start: 2023-08-31 | End: 2023-09-06

## 2023-08-31 RX ORDER — METOCLOPRAMIDE HYDROCHLORIDE 5 MG/ML
10 INJECTION INTRAMUSCULAR; INTRAVENOUS EVERY 6 HOURS PRN
Status: DISCONTINUED | OUTPATIENT
Start: 2023-08-31 | End: 2023-09-06

## 2023-08-31 RX ORDER — SODIUM CHLORIDE, SODIUM LACTATE, POTASSIUM CHLORIDE, CALCIUM CHLORIDE 600; 310; 30; 20 MG/100ML; MG/100ML; MG/100ML; MG/100ML
10-125 INJECTION, SOLUTION INTRAVENOUS CONTINUOUS
Status: DISCONTINUED | OUTPATIENT
Start: 2023-08-31 | End: 2023-09-06

## 2023-08-31 RX ORDER — ACETAMINOPHEN 325 MG/1
650 TABLET ORAL EVERY 4 HOURS PRN
Status: DISCONTINUED | OUTPATIENT
Start: 2023-08-31 | End: 2023-09-06

## 2023-08-31 RX ORDER — MAGNESIUM SULFATE IN WATER 40 MG/ML
2 INJECTION, SOLUTION INTRAVENOUS CONTINUOUS
Status: DISCONTINUED | OUTPATIENT
Start: 2023-08-31 | End: 2023-09-06

## 2023-08-31 RX ORDER — MAGNESIUM SULFATE HEPTAHYDRATE 40 MG/ML
4 INJECTION, SOLUTION INTRAVENOUS
Status: COMPLETED | OUTPATIENT
Start: 2023-08-31 | End: 2023-08-31

## 2023-08-31 RX ORDER — ONDANSETRON 4 MG/1
4 TABLET, ORALLY DISINTEGRATING ORAL EVERY 6 HOURS PRN
Status: DISCONTINUED | OUTPATIENT
Start: 2023-08-31 | End: 2023-09-06

## 2023-08-31 RX ORDER — DIPHENHYDRAMINE HCL 25 MG
25 CAPSULE ORAL EVERY 6 HOURS PRN
Status: DISCONTINUED | OUTPATIENT
Start: 2023-08-31 | End: 2023-09-06

## 2023-08-31 RX ORDER — AMOXICILLIN 250 MG
2 CAPSULE ORAL 2 TIMES DAILY
Status: DISCONTINUED | OUTPATIENT
Start: 2023-08-31 | End: 2023-09-06

## 2023-08-31 RX ORDER — DIPHENHYDRAMINE HYDROCHLORIDE 50 MG/ML
25 INJECTION INTRAMUSCULAR; INTRAVENOUS EVERY 6 HOURS PRN
Status: DISCONTINUED | OUTPATIENT
Start: 2023-08-31 | End: 2023-09-06

## 2023-08-31 RX ORDER — NIFEDIPINE 10 MG/1
10-20 CAPSULE ORAL
Status: DISCONTINUED | OUTPATIENT
Start: 2023-08-31 | End: 2023-08-31

## 2023-08-31 RX ORDER — CALCIUM GLUCONATE 94 MG/ML
1 INJECTION, SOLUTION INTRAVENOUS
Status: DISCONTINUED | OUTPATIENT
Start: 2023-08-31 | End: 2023-09-06

## 2023-08-31 RX ORDER — HYDRALAZINE HYDROCHLORIDE 20 MG/ML
10 INJECTION INTRAMUSCULAR; INTRAVENOUS
Status: DISCONTINUED | OUTPATIENT
Start: 2023-08-31 | End: 2023-09-10 | Stop reason: HOSPADM

## 2023-08-31 RX ORDER — MAGNESIUM SULFATE HEPTAHYDRATE 40 MG/ML
2 INJECTION, SOLUTION INTRAVENOUS
Status: COMPLETED | OUTPATIENT
Start: 2023-08-31 | End: 2023-08-31

## 2023-08-31 RX ORDER — ONDANSETRON 2 MG/ML
4 INJECTION INTRAMUSCULAR; INTRAVENOUS EVERY 6 HOURS PRN
Status: DISCONTINUED | OUTPATIENT
Start: 2023-08-31 | End: 2023-09-06

## 2023-08-31 RX ORDER — ASPIRIN 81 MG/1
81 TABLET ORAL DAILY
Status: DISCONTINUED | OUTPATIENT
Start: 2023-08-31 | End: 2023-09-06

## 2023-08-31 RX ORDER — PROCHLORPERAZINE 25 MG
25 SUPPOSITORY, RECTAL RECTAL EVERY 12 HOURS PRN
Status: DISCONTINUED | OUTPATIENT
Start: 2023-08-31 | End: 2023-09-06

## 2023-08-31 RX ORDER — PRENATAL VIT/IRON FUM/FOLIC AC 27MG-0.8MG
1 TABLET ORAL DAILY
Status: DISCONTINUED | OUTPATIENT
Start: 2023-08-31 | End: 2023-09-06

## 2023-08-31 RX ORDER — AMOXICILLIN 250 MG
1 CAPSULE ORAL 2 TIMES DAILY
Status: DISCONTINUED | OUTPATIENT
Start: 2023-08-31 | End: 2023-09-06

## 2023-08-31 RX ORDER — ASPIRIN 81 MG/1
81 TABLET ORAL DAILY
Status: ON HOLD | COMMUNITY
End: 2023-09-10

## 2023-08-31 RX ORDER — LABETALOL HYDROCHLORIDE 5 MG/ML
20 INJECTION, SOLUTION INTRAVENOUS
Status: DISCONTINUED | OUTPATIENT
Start: 2023-08-31 | End: 2023-09-10 | Stop reason: HOSPADM

## 2023-08-31 RX ADMIN — BETAMETHASONE ACETATE AND BETAMETHASONE SODIUM PHOSPHATE 12 MG: 3; 3 INJECTION, SUSPENSION INTRA-ARTICULAR; INTRALESIONAL; INTRAMUSCULAR; SOFT TISSUE at 17:30

## 2023-08-31 RX ADMIN — MAGNESIUM SULFATE HEPTAHYDRATE 4 G: 40 INJECTION, SOLUTION INTRAVENOUS at 17:07

## 2023-08-31 RX ADMIN — SODIUM CHLORIDE, POTASSIUM CHLORIDE, SODIUM LACTATE AND CALCIUM CHLORIDE 75 ML/HR: 600; 310; 30; 20 INJECTION, SOLUTION INTRAVENOUS at 17:06

## 2023-08-31 RX ADMIN — MAGNESIUM SULFATE HEPTAHYDRATE 2 G/HR: 40 INJECTION, SOLUTION INTRAVENOUS at 17:44

## 2023-08-31 RX ADMIN — MAGNESIUM SULFATE 2 G: 2 INJECTION INTRAVENOUS at 17:28

## 2023-08-31 RX ADMIN — LABETALOL HYDROCHLORIDE 20 MG: 5 INJECTION, SOLUTION INTRAVENOUS at 16:17

## 2023-08-31 RX ADMIN — SENNOSIDES AND DOCUSATE SODIUM 1 TABLET: 50; 8.6 TABLET ORAL at 21:37

## 2023-08-31 RX ADMIN — NIFEDIPINE 10 MG: 10 CAPSULE ORAL at 15:51

## 2023-08-31 RX ADMIN — ACETAMINOPHEN 650 MG: 325 TABLET, FILM COATED ORAL at 21:37

## 2023-08-31 ASSESSMENT — ACTIVITIES OF DAILY LIVING (ADL)
ADLS_ACUITY_SCORE: 18

## 2023-08-31 NOTE — PROGRESS NOTES
Data: Patient presented to [facility] at 1421.   Reason for maternal/fetal assessment per patient is Hypertension  .  Patient is a . Prenatal record reviewed.      OB History    Para Term  AB Living   4 1 0 1 2 0   SAB IAB Ectopic Multiple Live Births   0 0 0 0 0      # Outcome Date GA Lbr Bayron/2nd Weight Sex Delivery Anes PTL Lv   4 Current            3 AB 22 15w0d    SAB      2  22 25w5d 03:16 / 00:04 0.775 kg (1 lb 11.3 oz) M Vag-Spont IV N FD      Complications: Abruptio Placenta      Name: BLANQUITA ASTUDILOL FD      Apgar1: 0  Apgar5: 0   1 AB 2018 16w0d    IAB      . Medical history:   Past Medical History:   Diagnosis Date    Known health problems: none    . Gestational Age 33w2d. VSS. Fetal movement present. Patient denies cramping, backache, vaginal discharge, pelvic pressure, UTI symptoms, GI problems, bloody show, vaginal bleeding, headache, visual disturbances, abdominal pain, rupture of membranes.  Action: Verbal consent for EFM. Fetal assessment: Presumed adequate fetal oxygenation documented (see flow record).   Response: Dr. Finch informed of VS change. Plan per provider is admit to inpatient for observation. Patient verbalized agreement with plan. Patient transferred to room 479 ambulatory, oriented to room and call light.   Face to Face time:

## 2023-08-31 NOTE — CONSULTS
_       [facility]                      Neonatology Advanced Practice Antepartum Counseling Consult      I was asked to provide antepartum counseling for Malena Thomas at the request of Isaura Finch MD secondary to  delivery. Ms. Thomas is currently 33 2/7 weeks and has a hx significant for preeclampsia and growth restriction. Plan is to administer first dose of Betamethasone on  and patient is currently on magnesium infusion. Ms. Thomas, accompanied by her , was counseled on the expected hospital course, potential risks, and outcomes associated with an infant born at approximately 33 weeks gestation. The counseling included: morbidity, mortality, initial delivery room stabilization, respiratory course, lung development, RDS, patent ductus arteriosus, hyperbilirubinemia, hemodynamic support, infection (including NEC), intraventricular hemorrhage, nutrition, growth and development, and long term outcomes. Please feel free to call with any additional questions or concerns.          Talia Veliz CNP, DNP 2023 5:28 PM   Advanced Practice Service    Intensive Care Unit  [facility]      Floor Time (min): 10  Face to Face Time (min): 25  Total Time (minutes): 35  More than 50% of my time was spent in direct, face to face, antepartum counseling with the above patient.

## 2023-08-31 NOTE — PLAN OF CARE
"Pt to BP for evaluation of BP. Pt denies HA, visual changes, epigastric pain, heartburn. Denies ctx, LOF or bleeding. Denies pain. Unable to elicit reflexes LE, UE WNL. Generalized edema. Discussed serial BPs, labs, monitoring. Pt concerned she'll require a c/s today because the baby is \"too small\". Discussed reviewing labs, monitoring, trend in BP to make plan with MD. Vandana cartagena.  "

## 2023-08-31 NOTE — H&P
[facility]    History and Physical  Obstetrics and Gynecology     Date of Admission:  2023    Assessment & Plan   Malena Thomas is a 33 year old female who presents with pre-eclampsia with severe features based on BP criteria.  ASSESSMENT:   IUP @ 33w2d   NST reactive.  Category  I    VERY POOR OB HISTORY:  hx of 25w IUFD likely secondary to placental abruption.  Pre-eclampsia with SF/HELLP syndrome, DIC, pulmonary edema and ICU admission.  Next pregnancy complicated by 16w loss/delivery, unclear etiology and unclear if demised before or after delivery.    PLAN:     Fetal well being:  -IUGR with EFW and AC 2%ile.  Elevated dopplers  -First dose of BMTZ 23 at 1730.  Repeat in 24 hours  -S/p NICU consult    Pre-eclampsia with severe features:  -s/p IV antihypertensive x 2.  -Currently on magnesium 2g/hr, s/p 6g loading dose.  If stable, could consider discontinuing after 24 hours.  -HELLP labs q 6 hours.    Routine:  -GBS to be collected  -Tdap declined in clinic    Patient aware of delivery at 34w at the latest, but given poor OB history and previous complications, would have low threshold for delivery.  She is motivated for a vaginal delivery, but we also discussed the many reasons for which a  may be recommended, potentially urgently or emergently.  She reported she would be consenting to  if it were necessary.    Isaura Finch MD    -----------------------      History of Present Illness   Malena Thomas is a 33 year old female  33w2d  Estimated Date of Delivery: 10/17/23 is calculated from Patient's last menstrual period was 2023. [facility] with severe features.    PRENATAL COURSE  Prenatal course was complicated by severe IUGR with EFW and AC 2%ile, elevated dopplers      Recent Labs   Lab Test 23  1530   AS Negative     Rhogam not indicated   Recent Labs   Lab Test 23  1504    HEPBANG Nonreactive   HIAGAB Nonreactive   RUQIGG Positive       Past Medical History    I have reviewed this patient's medical history and updated it with pertinent information if needed.   Past Medical History:   Diagnosis Date    Known health problems: none        Past Surgical History   I have reviewed this patient's surgical history and updated it with pertinent information if needed.  Past Surgical History:   Procedure Laterality Date    NO HISTORY OF SURGERY         Prior to Admission Medications   Prior to Admission Medications   Prescriptions Last Dose Informant Patient Reported? Taking?   Prenatal Vit-Fe Fumarate-FA (PRENATAL MULTIVITAMIN W/IRON) 27-0.8 MG tablet 8/30/2023  No Yes   Sig: Take 1 tablet by mouth daily   Prenatal Vit-Fe Fumarate-FA (PRENATAL MULTIVITAMIN W/IRON) 27-0.8 MG tablet   No No   Sig: Take 1 tablet by mouth daily   Patient not taking: Reported on 7/18/2023   aspirin 81 MG EC tablet 8/30/2023  Yes Yes   Sig: Take 81 mg by mouth daily   docusate sodium (COLACE) 100 MG capsule   No No   Sig: Take 1 capsule (100 mg) by mouth 2 times daily as needed for constipation   Patient not taking: Reported on 7/18/2023   famotidine (PEPCID) 20 MG tablet 8/30/2023  No Yes   Sig: Take 1 tablet (20 mg) by mouth daily      Facility-Administered Medications: None     Allergies   No Known Allergies    Social History   I have reviewed this patient's social history and updated it with pertinent information if needed. Malena Thomas  reports that she has never smoked. She has never used smokeless tobacco. She reports that she does not currently use alcohol. She reports that she does not use drugs.    Family History   I have reviewed this patient's family history and updated it with pertinent information if needed.   History reviewed. No pertinent family history.    Immunization History     There is no immunization history on file for this patient.    Physical Exam   Temp: 99  F (37.2  C) Temp src: Oral BP:  129/78 Pulse: 84   Resp: 16   O2 Device: None (Room air)    Vital Signs with Ranges  Temp:  [98.4  F (36.9  C)-99  F (37.2  C)] 99  F (37.2  C)  Pulse:  [84] 84  Resp:  [16] 16  BP: (120-174)/() 129/78  SpO2:  [97 %] 97 %    Abdomen: gravid, non-tender      Fetal Heart Tones: 130 baseline, moderate variablility, + accels, no decels, and Category I  TOCO:    quiet    Constitutional: alert and no distress   Respiratory: no increased work of breathing  Cardiovascular: normal apical pulses   Skin/Extremites: no bruising or bleeding, normal skin color, texture, turgor, and no redness, warmth, or swelling  Neurologic: Awake, alert, oriented to name, place and time.  Cranial nerves II-XII are grossly intact.    Neuropsychiatric: appropriate mood and affect

## 2023-09-01 LAB
ALT SERPL W P-5'-P-CCNC: 10 U/L (ref 0–50)
ALT SERPL W P-5'-P-CCNC: 10 U/L (ref 0–50)
ALT SERPL W P-5'-P-CCNC: 9 U/L (ref 0–50)
ALT SERPL W P-5'-P-CCNC: 9 U/L (ref 0–50)
AST SERPL W P-5'-P-CCNC: 17 U/L (ref 0–45)
AST SERPL W P-5'-P-CCNC: 19 U/L (ref 0–45)
AST SERPL W P-5'-P-CCNC: 19 U/L (ref 0–45)
AST SERPL W P-5'-P-CCNC: 20 U/L (ref 0–45)
CREAT SERPL-MCNC: 0.65 MG/DL (ref 0.51–0.95)
CREAT SERPL-MCNC: 0.65 MG/DL (ref 0.51–0.95)
CREAT SERPL-MCNC: 0.67 MG/DL (ref 0.51–0.95)
CREAT SERPL-MCNC: 0.68 MG/DL (ref 0.51–0.95)
ERYTHROCYTE [DISTWIDTH] IN BLOOD BY AUTOMATED COUNT: 14 % (ref 10–15)
ERYTHROCYTE [DISTWIDTH] IN BLOOD BY AUTOMATED COUNT: 14.2 % (ref 10–15)
ERYTHROCYTE [DISTWIDTH] IN BLOOD BY AUTOMATED COUNT: 14.4 % (ref 10–15)
ERYTHROCYTE [DISTWIDTH] IN BLOOD BY AUTOMATED COUNT: 14.5 % (ref 10–15)
GFR SERPL CREATININE-BSD FRML MDRD: >90 ML/MIN/1.73M2
GP B STREP DNA SPEC QL NAA+PROBE: POSITIVE
HCT VFR BLD AUTO: 37.1 % (ref 35–47)
HCT VFR BLD AUTO: 38.5 % (ref 35–47)
HCT VFR BLD AUTO: 38.6 % (ref 35–47)
HCT VFR BLD AUTO: 38.7 % (ref 35–47)
HGB BLD-MCNC: 12.9 G/DL (ref 11.7–15.7)
HGB BLD-MCNC: 13.3 G/DL (ref 11.7–15.7)
HGB BLD-MCNC: 13.3 G/DL (ref 11.7–15.7)
HGB BLD-MCNC: 13.5 G/DL (ref 11.7–15.7)
HOLD SPECIMEN: NORMAL
MCH RBC QN AUTO: 33.3 PG (ref 26.5–33)
MCH RBC QN AUTO: 33.4 PG (ref 26.5–33)
MCH RBC QN AUTO: 33.5 PG (ref 26.5–33)
MCH RBC QN AUTO: 33.7 PG (ref 26.5–33)
MCHC RBC AUTO-ENTMCNC: 34.4 G/DL (ref 31.5–36.5)
MCHC RBC AUTO-ENTMCNC: 34.5 G/DL (ref 31.5–36.5)
MCHC RBC AUTO-ENTMCNC: 34.8 G/DL (ref 31.5–36.5)
MCHC RBC AUTO-ENTMCNC: 35 G/DL (ref 31.5–36.5)
MCV RBC AUTO: 96 FL (ref 78–100)
MCV RBC AUTO: 97 FL (ref 78–100)
PLATELET # BLD AUTO: 177 10E3/UL (ref 150–450)
PLATELET # BLD AUTO: 183 10E3/UL (ref 150–450)
PLATELET # BLD AUTO: 186 10E3/UL (ref 150–450)
PLATELET # BLD AUTO: 195 10E3/UL (ref 150–450)
RBC # BLD AUTO: 3.83 10E6/UL (ref 3.8–5.2)
RBC # BLD AUTO: 3.98 10E6/UL (ref 3.8–5.2)
RBC # BLD AUTO: 3.99 10E6/UL (ref 3.8–5.2)
RBC # BLD AUTO: 4.03 10E6/UL (ref 3.8–5.2)
T PALLIDUM AB SER QL: NONREACTIVE
WBC # BLD AUTO: 14.5 10E3/UL (ref 4–11)
WBC # BLD AUTO: 15.5 10E3/UL (ref 4–11)
WBC # BLD AUTO: 15.6 10E3/UL (ref 4–11)
WBC # BLD AUTO: 17.6 10E3/UL (ref 4–11)

## 2023-09-01 PROCEDURE — 258N000003 HC RX IP 258 OP 636

## 2023-09-01 PROCEDURE — 59025 FETAL NON-STRESS TEST: CPT | Mod: 26 | Performed by: OBSTETRICS & GYNECOLOGY

## 2023-09-01 PROCEDURE — 85027 COMPLETE CBC AUTOMATED: CPT | Performed by: OBSTETRICS & GYNECOLOGY

## 2023-09-01 PROCEDURE — 36415 COLL VENOUS BLD VENIPUNCTURE: CPT | Performed by: OBSTETRICS & GYNECOLOGY

## 2023-09-01 PROCEDURE — 84450 TRANSFERASE (AST) (SGOT): CPT | Performed by: OBSTETRICS & GYNECOLOGY

## 2023-09-01 PROCEDURE — 250N000013 HC RX MED GY IP 250 OP 250 PS 637

## 2023-09-01 PROCEDURE — 250N000011 HC RX IP 250 OP 636

## 2023-09-01 PROCEDURE — 84460 ALANINE AMINO (ALT) (SGPT): CPT | Performed by: OBSTETRICS & GYNECOLOGY

## 2023-09-01 PROCEDURE — 120N000002 HC R&B MED SURG/OB UMMC

## 2023-09-01 PROCEDURE — 82565 ASSAY OF CREATININE: CPT | Performed by: OBSTETRICS & GYNECOLOGY

## 2023-09-01 PROCEDURE — 99231 SBSQ HOSP IP/OBS SF/LOW 25: CPT | Mod: 25 | Performed by: OBSTETRICS & GYNECOLOGY

## 2023-09-01 RX ADMIN — SODIUM CHLORIDE, POTASSIUM CHLORIDE, SODIUM LACTATE AND CALCIUM CHLORIDE 75 ML/HR: 600; 310; 30; 20 INJECTION, SOLUTION INTRAVENOUS at 04:47

## 2023-09-01 RX ADMIN — SENNOSIDES AND DOCUSATE SODIUM 1 TABLET: 50; 8.6 TABLET ORAL at 08:56

## 2023-09-01 RX ADMIN — ASPIRIN 81 MG: 81 TABLET, COATED ORAL at 08:56

## 2023-09-01 RX ADMIN — SODIUM CHLORIDE, POTASSIUM CHLORIDE, SODIUM LACTATE AND CALCIUM CHLORIDE 75 ML/HR: 600; 310; 30; 20 INJECTION, SOLUTION INTRAVENOUS at 17:01

## 2023-09-01 RX ADMIN — POLYETHYLENE GLYCOL 3350 17 G: 17 POWDER, FOR SOLUTION ORAL at 08:56

## 2023-09-01 RX ADMIN — PRENATAL VIT W/ FE FUMARATE-FA TAB 27-0.8 MG 1 TABLET: 27-0.8 TAB at 08:56

## 2023-09-01 RX ADMIN — BETAMETHASONE ACETATE AND BETAMETHASONE SODIUM PHOSPHATE 12 MG: 3; 3 INJECTION, SUSPENSION INTRA-ARTICULAR; INTRALESIONAL; INTRAMUSCULAR; SOFT TISSUE at 17:13

## 2023-09-01 RX ADMIN — MAGNESIUM SULFATE HEPTAHYDRATE 2 G/HR: 40 INJECTION, SOLUTION INTRAVENOUS at 12:51

## 2023-09-01 ASSESSMENT — ACTIVITIES OF DAILY LIVING (ADL)
ADLS_ACUITY_SCORE: 18

## 2023-09-01 NOTE — PLAN OF CARE
Afebrile. VSS, some elevated Bps (see flowsheets). Pt reported headache and received 650 mg PO tylenol a 2130. Headache resolved. Denies all other s/s of Preeclampsia. Endorses active fetal movement. See flowsheets for fetal and uterine assessment. Denies LOF, vaginal bleeding, abdominal pain/cramping, SOB and chest pain. Adequate I/O's (See flowsheets). Pt educated to call nurse with any changes from baseline.

## 2023-09-01 NOTE — PROGRESS NOTES
Antepartum Rounds    NAME:   Malena Thomas    MR #:     [id number]    DATE:  2023       SUBJECTIVE:   Feeling ok, but has a lot of questions about plan of care, timing of delivery.         OBJECTIVE:  Vitals:    23 0700 23 0815 23 0900 23 1015   BP: (!) 148/84 (!) 143/70 122/71 129/69   BP Location:  Left arm Left arm Left arm   Patient Position:  Semi-Cheney's Semi-Cheney's Left side   Cuff Size:  Adult Large Adult Large Adult Large   Pulse:       Resp:       Temp:  98.3  F (36.8  C)  98.3  F (36.8  C)   TempSrc:  Oral  Oral   SpO2: 98% 99% 99%    Weight:       Height:           TOCO: no contractions  EFM:  125 baseline, moderate variablility, + accels, no decels, Category I, reactive NST     Gen:  NAD   normal respiratory and cardiovascular effort   Abd: nontender, gravid  Uterus: gravid, NT  Cx:  deferred      ASSESSMENT/PLAN:  Patient Is a  at 33w3d who is HD #2, admitted with preE with SF based on BP criteria.    Pregnancy has been c/b IUGR with severe growth restriction (2%) and abnormal dopplers.   She received 2 doses of IV antihypertensives last night.  Since that time her blood pressures have been stable overnight.    Malena has many questions all of which we reviewed this morning.  Discussed that she will be here for the duration of her pregnancy.  As long as fetal status is reassuring she will remain pregnant until 34 weeks at which time we will begin induction of labor.  Discussed that given the baby's growth restriction, there is a possibility a  section will need to be performed for fetal intolerance of labor.  Acknowledged that patient wishes a vaginal birth.  That is the anticipated plan, however given growth restriction the baby may not tolerate the stress of labor.  In that case she she would agree to a  section.    - S/P 1st dose of BMZ last night, repeat in 24 hours  - S/p NICU consult   - GBS positive - ABX in labor  - change labs to q 12  hours if remains stable   - continue mag x 24 and possible discontinue magnesium if stable  - Fetal status reassuring on continuous monitoring, plan twice weekly dopplers         Lauren Torres MD   September 1, 2023

## 2023-09-01 NOTE — PLAN OF CARE
Patient stable this shift.  VSS.  BP WDL. Denies LOF, cramping/abimael or vaginal bleeding. See flow sheet for FHR and contraction pattern.  Second Beta given at 1715.  Magnesium turned off at 1800. Offers no complaints.  Continue with routine cares and will notify provider if there is a change in status.

## 2023-09-01 NOTE — PROGRESS NOTES
[facility] PROGRESS NOTE:   2023 11:22 PM         SUBJECTIVE:   Patient reports she has no complaints.  No HA, vision changes, CP, SOB or RUQ pain.  Did have HA earlier, but that resolved with a dose of Tylenol.           OBJECTIVE:     Vitals:    23 2101 23 2200   BP:  (!) 140/87 (!) 133/90 124/68   BP Location:       Patient Position:       Cuff Size:       Pulse:       Resp:   16 17   Temp:       TempSrc:       SpO2: 99% 98% 100% 99%   Weight:       Height:             NST:  reassuring overall.  Sketchy tracing due to mom's position, but baseline 125-130 with mod variability.  No accels or decels recently.  Used US to help RN find baby with monitor while patient on her side.  [place]:  quiet    Component      Latest Ref Rng 2023  3:30 PM 2023  8:03 PM   WBC      4.0 - 11.0 10e3/uL 9.9  16.8 (H)    RBC Count      3.80 - 5.20 10e6/uL 3.90  4.24    Hemoglobin      11.7 - 15.7 g/dL 13.1  14.3    Hematocrit      35.0 - 47.0 % 37.6  41.0    MCV      78 - 100 fL 96  97    MCH      26.5 - 33.0 pg 33.6 (H)  33.7 (H)    MCHC      31.5 - 36.5 g/dL 34.8  34.9    RDW      10.0 - 15.0 % 14.1  13.8    Platelet Count      150 - 450 10e3/uL 150  176    Creatinine      0.51 - 0.95 mg/dL 0.68  0.67    GFR Estimate      >60 mL/min/1.73m2 >90  >90    AST      0 - 45 U/L 20  22    ALT      0 - 50 U/L 9  9       Legend:  (H) High         ASSESSMENT / PLAN:   33 year old  at 33w2d admitted with pre-eclampsia with severe features based on BP criteria. .    Fetal well being:  -IUGR with EFW and AC 2%ile.  Elevated dopplers  -First dose of BMTZ 23 at 1730.  Repeat in 24 hours  -S/p NICU consult     Pre-eclampsia with severe features:  -s/p IV antihypertensive x 2.  -Currently on magnesium 2g/hr, s/p 6g loading dose.  If stable, could consider discontinuing after 24 hours.  Currently no s/sx of toxicity.  -HELLP labs q 6 hours.  Stable and WNL at this  time.     Routine:  -GBS to be collected  -Tdap declined in clinic    Cont inpatient until delivery at 34w, sooner if indicated.    Isaura Finch MD

## 2023-09-01 NOTE — PROVIDER NOTIFICATION
09/01/23 0124   Provider Notification   Provider Name/Title Dr. Stef MD   Method of Notification Phone   Notification Reason Maternal Vital Sign Change     Paged for elevated Bps (See floowsheets). If next BP @ 0200 is elevated, will paged MD for PO Nifedipine ER.

## 2023-09-01 NOTE — PLAN OF CARE
Afebrile. BP was elevated in triage and was treated, see MAR. BP when transferred to labor room was WDL. Magnesium sulfate infusion was initiated. During loading dose pt reported feeling flushed and tongue numbness, symptoms resolved following loading dose completion. Betamethasone given. EFM as charted. Report given to KAI Kraus.

## 2023-09-01 NOTE — PROGRESS NOTES
"SPIRITUAL HEALTH SERVICES Progress Note  [facility] ([facility] [place]    Saw pt Malena Thomas per LOS. I introduced myself to Malena and oriented her to [facility]    Patient/Family Understanding of Illness and Goals of Care - Malena has severe preeclampsia and was told that she will likely deliver at 34 weeks. She is currently 33 and 3.    Distress and Loss - Malena has had two previous losses at 25 and 16 weeks and was feeling sad about the idea of her daughter being in the NICU and born \"so early\". We spent some time validating those feelings and grief around her expectations.     Strengths, Coping, and Resources - Malena shared that she is supported by her family and appreciates being able to \"have conversations like these\". Malena requested a visit again on Monday.    Meaning, Beliefs, and Spirituality - Malena is a practicing Sikh. We spoke about the will and power of Allah and how to lean on him in moments of difficulty like these.     Plan of Care - I plan to follow up with Malena next week. [facility] remains available for support as needed/requested.     Radha Galvez  Staff    Pager [phone]    * [facility] remains available 24/7 for emergent requests/referrals, either by having the switchboard page the on-call  or by entering an ASAP/STAT consult in Epic (this will also page the on-call ). Routine Epic consults receive an initial response within 24 hours.*    "

## 2023-09-02 ENCOUNTER — APPOINTMENT (OUTPATIENT)
Dept: ULTRASOUND IMAGING | Facility: CLINIC | Age: 33
End: 2023-09-02
Attending: OBSTETRICS & GYNECOLOGY
Payer: MEDICAID

## 2023-09-02 LAB
ALT SERPL W P-5'-P-CCNC: 10 U/L (ref 0–50)
ALT SERPL W P-5'-P-CCNC: 9 U/L (ref 0–50)
ALT SERPL W P-5'-P-CCNC: 9 U/L (ref 0–50)
AST SERPL W P-5'-P-CCNC: 16 U/L (ref 0–45)
AST SERPL W P-5'-P-CCNC: 20 U/L (ref 0–45)
AST SERPL W P-5'-P-CCNC: 21 U/L (ref 0–45)
CREAT SERPL-MCNC: 0.7 MG/DL (ref 0.51–0.95)
CREAT SERPL-MCNC: 0.71 MG/DL (ref 0.51–0.95)
CREAT SERPL-MCNC: 0.86 MG/DL (ref 0.51–0.95)
ERYTHROCYTE [DISTWIDTH] IN BLOOD BY AUTOMATED COUNT: 14.5 % (ref 10–15)
ERYTHROCYTE [DISTWIDTH] IN BLOOD BY AUTOMATED COUNT: 14.6 % (ref 10–15)
ERYTHROCYTE [DISTWIDTH] IN BLOOD BY AUTOMATED COUNT: 14.7 % (ref 10–15)
GFR SERPL CREATININE-BSD FRML MDRD: >90 ML/MIN/1.73M2
HCT VFR BLD AUTO: 34 % (ref 35–47)
HCT VFR BLD AUTO: 34.4 % (ref 35–47)
HCT VFR BLD AUTO: 36.1 % (ref 35–47)
HGB BLD-MCNC: 11.8 G/DL (ref 11.7–15.7)
HGB BLD-MCNC: 12.2 G/DL (ref 11.7–15.7)
HGB BLD-MCNC: 12.7 G/DL (ref 11.7–15.7)
MCH RBC QN AUTO: 33.7 PG (ref 26.5–33)
MCH RBC QN AUTO: 34 PG (ref 26.5–33)
MCH RBC QN AUTO: 34.4 PG (ref 26.5–33)
MCHC RBC AUTO-ENTMCNC: 34.3 G/DL (ref 31.5–36.5)
MCHC RBC AUTO-ENTMCNC: 35.2 G/DL (ref 31.5–36.5)
MCHC RBC AUTO-ENTMCNC: 35.9 G/DL (ref 31.5–36.5)
MCV RBC AUTO: 96 FL (ref 78–100)
MCV RBC AUTO: 97 FL (ref 78–100)
MCV RBC AUTO: 98 FL (ref 78–100)
PLATELET # BLD AUTO: 171 10E3/UL (ref 150–450)
PLATELET # BLD AUTO: 172 10E3/UL (ref 150–450)
PLATELET # BLD AUTO: 172 10E3/UL (ref 150–450)
RBC # BLD AUTO: 3.5 10E6/UL (ref 3.8–5.2)
RBC # BLD AUTO: 3.55 10E6/UL (ref 3.8–5.2)
RBC # BLD AUTO: 3.73 10E6/UL (ref 3.8–5.2)
WBC # BLD AUTO: 15.9 10E3/UL (ref 4–11)
WBC # BLD AUTO: 17.3 10E3/UL (ref 4–11)
WBC # BLD AUTO: 17.6 10E3/UL (ref 4–11)

## 2023-09-02 PROCEDURE — 59025 FETAL NON-STRESS TEST: CPT | Mod: 26 | Performed by: OBSTETRICS & GYNECOLOGY

## 2023-09-02 PROCEDURE — 85027 COMPLETE CBC AUTOMATED: CPT | Performed by: OBSTETRICS & GYNECOLOGY

## 2023-09-02 PROCEDURE — 76820 UMBILICAL ARTERY ECHO: CPT | Mod: 26 | Performed by: OBSTETRICS & GYNECOLOGY

## 2023-09-02 PROCEDURE — 36415 COLL VENOUS BLD VENIPUNCTURE: CPT | Performed by: OBSTETRICS & GYNECOLOGY

## 2023-09-02 PROCEDURE — 84460 ALANINE AMINO (ALT) (SGPT): CPT | Performed by: OBSTETRICS & GYNECOLOGY

## 2023-09-02 PROCEDURE — 76819 FETAL BIOPHYS PROFIL W/O NST: CPT

## 2023-09-02 PROCEDURE — 84450 TRANSFERASE (AST) (SGOT): CPT | Performed by: OBSTETRICS & GYNECOLOGY

## 2023-09-02 PROCEDURE — 250N000013 HC RX MED GY IP 250 OP 250 PS 637

## 2023-09-02 PROCEDURE — 82565 ASSAY OF CREATININE: CPT | Performed by: OBSTETRICS & GYNECOLOGY

## 2023-09-02 PROCEDURE — 120N000002 HC R&B MED SURG/OB UMMC

## 2023-09-02 PROCEDURE — 76819 FETAL BIOPHYS PROFIL W/O NST: CPT | Mod: 26 | Performed by: OBSTETRICS & GYNECOLOGY

## 2023-09-02 PROCEDURE — 99231 SBSQ HOSP IP/OBS SF/LOW 25: CPT | Mod: 25 | Performed by: OBSTETRICS & GYNECOLOGY

## 2023-09-02 RX ADMIN — SENNOSIDES AND DOCUSATE SODIUM 1 TABLET: 50; 8.6 TABLET ORAL at 11:54

## 2023-09-02 RX ADMIN — PRENATAL VIT W/ FE FUMARATE-FA TAB 27-0.8 MG 1 TABLET: 27-0.8 TAB at 11:54

## 2023-09-02 RX ADMIN — POLYETHYLENE GLYCOL 3350 17 G: 17 POWDER, FOR SOLUTION ORAL at 11:55

## 2023-09-02 RX ADMIN — SENNOSIDES AND DOCUSATE SODIUM 1 TABLET: 50; 8.6 TABLET ORAL at 20:16

## 2023-09-02 RX ADMIN — ASPIRIN 81 MG: 81 TABLET, COATED ORAL at 11:55

## 2023-09-02 ASSESSMENT — ACTIVITIES OF DAILY LIVING (ADL)
ADLS_ACUITY_SCORE: 18

## 2023-09-02 NOTE — PROVIDER NOTIFICATION
09/02/23 0758   Provider Notification   Provider Name/Title Dr. Torres   Method of Notification In Department     D: Fetal monitor orders as continuous and orders changed to TID. Baby very active this morning and tracing very difficult. Asked patient if I could monitor her baby later this morning again due to difficult tracing and patient asked that I come back in a hour. She is having heart burn right now and requesting milk for that. Denies vision changes, HA, N/V. States baby is very active. States she has heartburn daily and takes milk to help relieve it. P: Continue to monitor.

## 2023-09-02 NOTE — PROVIDER NOTIFICATION
09/02/23 1042   Provider Notification   Provider Name/Title Dr. avila   Method of Notification Electronic Page   Notification Reason Status Update     D: Patient back from ultrasound, dopplers elevated, BPP 6/8 patient placed back on the monitor and fetal heart tracing reactive. Dr. Avila paged with results. P: Continue to monitor.

## 2023-09-02 NOTE — PROGRESS NOTES
Dr. Torres updated on patient status. Did discuss difficulty obtaining monitoring this morning related to fetal movement and what sounds like fetal hiccups. Even when holding ultrasound on abdomen - audible fetal heart rate heard consistently - its just not tracing.

## 2023-09-02 NOTE — PLAN OF CARE
Data: Maternal status vital signs stable except for a few elevated blood pressures that were not treatable. Afebrile. Signs and symptoms of infection not present. Denies any leaking of fluids, vaginal bleeding, and or painful contractions. Patient denies any visual changes, headaches, or epigastric pain. See flow sheets for details on fetal heart rate tracings and uterine activity.   Action/interventions: Encourage voiding, hydration, and repositioning.   Plan: Continue expectant management. Observe for and notify care provider of indicators of progressing labor, signs/symptoms of infection, fetal/maternal compromise. Continue with plan of care. Report given to Radha QUINN.

## 2023-09-02 NOTE — PROGRESS NOTES
Antepartum Daily Progress Note:      Subjective:  Contractions:  no  Leakage of fluid:  no  Vaginal bleeding:  no  Fetal movement:  yes    Denies RUQ pain, HA, N/V. Wondering about delivery plan and if on Tuesday? Wants to make sure baby is healthy and had been told during the pregnancy it would be 37 weeks.     Objective:  Vitals:    23 2030 23 0105 23 0519 23 0903   BP: 130/66 129/56 (!) 146/62 132/68   BP Location: Left arm Left arm  Right arm   Patient Position: Semi-Cheney's Semi-Cheney's  Semi-Cheney's   Cuff Size: Adult Large Adult Large  Adult Large   Pulse:       Resp: 18 18  16   Temp: 98.3  F (36.8  C) 98.2  F (36.8  C)  98.1  F (36.7  C)   TempSrc: Oral Oral  Oral   SpO2: 99% 98%     Weight:       Height:           FETAL HEART TONES: baseline 120 .  moderate variablility, + accels, no decels, Category I     NST reactive  [place]:  none     Abdomen:  Gravid, NT  Ext:  NT, no edema    BPP today 6/8 with 2 off for breathing. UAR with increased resistance--unchanged.  NST reactive and labs stable.         ASSESSMENT / PLAN:   33 year old  at 33w4d admitted with pre-eclampsia with severe features based on BP criteria. .    Fetal well being:  -IUGR with EFW and AC 2%ile.  Elevated dopplers  -s/p betamethasone X2  -S/p NICU consult     Pre-eclampsia with severe features:  -s/p IV antihypertensive x 2.  -s/p magnesium.  -HELLP labs stable. Will switch to BID lab check.     Routine:  GBS positive  Plan IOL at 34 weeks . Reviewed with the new dx of preE severe features, that is why moved up from 37 to 34 weeks.   Discussed high chance of needing  delivery due to FGR and elevated UAR.  c/s was discussed in detail including risk of bleeding, infection, damage to abdominal organs including bowel, bladder, blood vessels, nerves. No questions. Discussed NICU stay for baby for 3-4 weeks probably. Will try and get her a tour today.      Saira Avila MD

## 2023-09-02 NOTE — PROVIDER NOTIFICATION
09/02/23 1123   Provider Notification   Provider Name/Title Dr. Avila   Method of Notification At Bedside   Notification Reason Other (Comment)     Dr. Avila reviewed fetal heart rate tracing and MFM ultrasound. Will transition to TID monitoring. BP WNL. Patient denies HA and vision changes. P: Continue to monitor.

## 2023-09-02 NOTE — PROGRESS NOTES
Continuously adjusting US from 0522 to present. Unable to obtain more than 9 minutes of FHR tracing related to fetal movement and maternal habitus.

## 2023-09-03 LAB
ALT SERPL W P-5'-P-CCNC: 11 U/L (ref 0–50)
ALT SERPL W P-5'-P-CCNC: 11 U/L (ref 0–50)
AST SERPL W P-5'-P-CCNC: 20 U/L (ref 0–45)
AST SERPL W P-5'-P-CCNC: 22 U/L (ref 0–45)
CREAT SERPL-MCNC: 0.78 MG/DL (ref 0.51–0.95)
CREAT SERPL-MCNC: 0.84 MG/DL (ref 0.51–0.95)
ERYTHROCYTE [DISTWIDTH] IN BLOOD BY AUTOMATED COUNT: 14.4 % (ref 10–15)
ERYTHROCYTE [DISTWIDTH] IN BLOOD BY AUTOMATED COUNT: 14.6 % (ref 10–15)
GFR SERPL CREATININE-BSD FRML MDRD: >90 ML/MIN/1.73M2
GFR SERPL CREATININE-BSD FRML MDRD: >90 ML/MIN/1.73M2
HCT VFR BLD AUTO: 34.3 % (ref 35–47)
HCT VFR BLD AUTO: 35.4 % (ref 35–47)
HGB BLD-MCNC: 11.8 G/DL (ref 11.7–15.7)
HGB BLD-MCNC: 12 G/DL (ref 11.7–15.7)
MCH RBC QN AUTO: 33.6 PG (ref 26.5–33)
MCH RBC QN AUTO: 33.6 PG (ref 26.5–33)
MCHC RBC AUTO-ENTMCNC: 33.9 G/DL (ref 31.5–36.5)
MCHC RBC AUTO-ENTMCNC: 34.4 G/DL (ref 31.5–36.5)
MCV RBC AUTO: 98 FL (ref 78–100)
MCV RBC AUTO: 99 FL (ref 78–100)
PLATELET # BLD AUTO: 150 10E3/UL (ref 150–450)
PLATELET # BLD AUTO: 158 10E3/UL (ref 150–450)
RBC # BLD AUTO: 3.51 10E6/UL (ref 3.8–5.2)
RBC # BLD AUTO: 3.57 10E6/UL (ref 3.8–5.2)
WBC # BLD AUTO: 14.6 10E3/UL (ref 4–11)
WBC # BLD AUTO: 15.5 10E3/UL (ref 4–11)

## 2023-09-03 PROCEDURE — 59025 FETAL NON-STRESS TEST: CPT | Mod: 26 | Performed by: OBSTETRICS & GYNECOLOGY

## 2023-09-03 PROCEDURE — 85027 COMPLETE CBC AUTOMATED: CPT | Performed by: OBSTETRICS & GYNECOLOGY

## 2023-09-03 PROCEDURE — 99231 SBSQ HOSP IP/OBS SF/LOW 25: CPT | Mod: 25 | Performed by: OBSTETRICS & GYNECOLOGY

## 2023-09-03 PROCEDURE — 250N000013 HC RX MED GY IP 250 OP 250 PS 637

## 2023-09-03 PROCEDURE — 36415 COLL VENOUS BLD VENIPUNCTURE: CPT | Performed by: OBSTETRICS & GYNECOLOGY

## 2023-09-03 PROCEDURE — 120N000002 HC R&B MED SURG/OB UMMC

## 2023-09-03 PROCEDURE — 82565 ASSAY OF CREATININE: CPT | Performed by: OBSTETRICS & GYNECOLOGY

## 2023-09-03 PROCEDURE — 84460 ALANINE AMINO (ALT) (SGPT): CPT | Performed by: OBSTETRICS & GYNECOLOGY

## 2023-09-03 PROCEDURE — 84450 TRANSFERASE (AST) (SGOT): CPT | Performed by: OBSTETRICS & GYNECOLOGY

## 2023-09-03 RX ORDER — FAMOTIDINE 10 MG
10 TABLET ORAL 2 TIMES DAILY PRN
Status: DISCONTINUED | OUTPATIENT
Start: 2023-09-03 | End: 2023-09-10 | Stop reason: HOSPADM

## 2023-09-03 RX ADMIN — FAMOTIDINE 10 MG: 10 TABLET ORAL at 19:45

## 2023-09-03 RX ADMIN — POLYETHYLENE GLYCOL 3350 17 G: 17 POWDER, FOR SOLUTION ORAL at 07:56

## 2023-09-03 RX ADMIN — ALUMINUM HYDROXIDE, MAGNESIUM HYDROXIDE, AND SIMETHICONE 30 ML: 200; 200; 20 SUSPENSION ORAL at 17:14

## 2023-09-03 RX ADMIN — ASPIRIN 81 MG: 81 TABLET, COATED ORAL at 07:56

## 2023-09-03 RX ADMIN — PRENATAL VIT W/ FE FUMARATE-FA TAB 27-0.8 MG 1 TABLET: 27-0.8 TAB at 07:56

## 2023-09-03 RX ADMIN — SENNOSIDES AND DOCUSATE SODIUM 1 TABLET: 50; 8.6 TABLET ORAL at 19:45

## 2023-09-03 RX ADMIN — SENNOSIDES AND DOCUSATE SODIUM 1 TABLET: 50; 8.6 TABLET ORAL at 07:56

## 2023-09-03 ASSESSMENT — ACTIVITIES OF DAILY LIVING (ADL)
ADLS_ACUITY_SCORE: 18

## 2023-09-03 NOTE — PROGRESS NOTES
Antepartum Rounds    NAME:   Malena Thomas    MR #:     [id number]    DATE:  September 3, 2023       SUBJECTIVE:   Feeling better, normal FM.   No contractions, LOF. Denies HA's.   Feels like her swelling has gone down since she has been in the hospital.         OBJECTIVE:  Vitals:    23 0444 23 0540 23 0806 23 1229   BP: 135/81  (!) 147/84 (!) 140/82   BP Location:   Left arm (P) Left arm   Patient Position:   Sitting (P) Semi-Cheney's   Cuff Size:   Adult Regular (P) Adult Regular   Pulse:       Resp: 16   (P) 16   Temp: 98.3  F (36.8  C)  98  F (36.7  C) (P) 98.4  F (36.9  C)   TempSrc: Oral  Oral (P) Oral   SpO2:       Weight:  107 kg (236 lb)     Height:           TOCO: no contractions   EFM:  132 baseline, moderate variablility, + accels, no decels, Category I, NST is reactive at 13:00 today    Gen:  NAD   normal respiratory and cardiovascular effort   Abd: nontender, gravid  Uterus: nontender  Bilateral LE: no clonus or patellar reflexes, NT, 1+ edema    Cx:  deferred      Intake/Output Summary (Last 24 hours) at 9/3/2023 1315  Last data filed at 9/3/2023 1239  Gross per 24 hour   Intake 1806 ml   Output 2750 ml   Net -944 ml        Wt Readings from Last 2 Encounters:   23 107 kg (236 lb)   23 106.2 kg (234 lb 1.6 oz)       ASSESSMENT/PLAN:   at @ 33w5d who is HD #4 here with preE with SF based on BP criteria  Pregnancy also complicated by IUGR with EFW at 2% and abnormal dopplers.     Currently BP's stable and fetal status is reassuring with a reactive NST.   Baby is cephalic on recent US   She is s/p BMZ x 2   Has met with NICU and plans to take a tour today of the NICU.   Plan for IOL in 2 days when she turns 34 wks.   discussed again plan for  but concern that baby will not tolerate the stress of labor. Patient is willing to have a  section in that case.     Continue with close  monitoring of BP's  TID monitoring of baby or more prn  Q 12 hour labs.    Plan of care discussed at length with patient and her partner who is here with her today.     Lauren Torres MD

## 2023-09-03 NOTE — PLAN OF CARE
"Data: Blood pressures within parameters. Signs and symptoms of pre-eclampsia: pt denies. /81   Pulse 84   Temp 98.3  F (36.8  C) (Oral)   Resp 16   Ht 1.6 m (5' 3\")   Wt 107 kg (236 lb)   LMP 01/02/2023   SpO2 98%   BMI 41.81 kg/m  .Fetal assessment Appropriate for Gestational Age.  Interventions: Monitor blood pressures and indicators of pre-eclampsia every 4 hours. Continue uterine/fetal assessment as ordered TID. Activity level Regular activity. Preventive measures include Medications, Positioning, and Frequent voiding. Encourage active range of motion and frequent position changes.  Plan: Continue expectant management. Observe for and notify care provider of indicators of worsening pre-eclampsia or signs of fetal/maternal compromise.   "

## 2023-09-04 PROCEDURE — 250N000013 HC RX MED GY IP 250 OP 250 PS 637

## 2023-09-04 PROCEDURE — 120N000002 HC R&B MED SURG/OB UMMC

## 2023-09-04 PROCEDURE — 99231 SBSQ HOSP IP/OBS SF/LOW 25: CPT | Performed by: OBSTETRICS & GYNECOLOGY

## 2023-09-04 RX ADMIN — PRENATAL VIT W/ FE FUMARATE-FA TAB 27-0.8 MG 1 TABLET: 27-0.8 TAB at 11:02

## 2023-09-04 RX ADMIN — SENNOSIDES AND DOCUSATE SODIUM 1 TABLET: 50; 8.6 TABLET ORAL at 19:47

## 2023-09-04 RX ADMIN — SENNOSIDES AND DOCUSATE SODIUM 1 TABLET: 50; 8.6 TABLET ORAL at 11:02

## 2023-09-04 RX ADMIN — ASPIRIN 81 MG: 81 TABLET, COATED ORAL at 11:03

## 2023-09-04 RX ADMIN — POLYETHYLENE GLYCOL 3350 17 G: 17 POWDER, FOR SOLUTION ORAL at 11:03

## 2023-09-04 ASSESSMENT — ACTIVITIES OF DAILY LIVING (ADL)
ADLS_ACUITY_SCORE: 18

## 2023-09-04 NOTE — PLAN OF CARE
"Data: Blood pressures within parameters. Signs and symptoms of pre-eclampsia are not present. VSS. /78 (Cuff Size: Adult Large)   Pulse 84   Temp 97.6  F (36.4  C) (Oral)   Resp 16   Ht 1.6 m (5' 3\")   Wt 106.5 kg (234 lb 12.8 oz)   LMP 01/02/2023   SpO2 98%   BMI 41.59 kg/m  . Fetal assessment is currently on. Will document in EHR.  Interventions: Monitor blood pressures and indicators of pre-eclampsia every 4 hours. Continue uterine/fetal assessment as ordered TID. Activity level Regular activity. Preventive measures include Medications, Positioning, and Frequent voiding. Encourage active range of motion and frequent position changes.  Plan: Continue expectant management. Observe for and notify care provider of indicators of worsening pre-eclampsia or signs of fetal/maternal compromise.   "

## 2023-09-04 NOTE — PROGRESS NOTES
Antepartum Daily Progress Note:      Subjective:  Contractions:  no  Leakage of fluid:  no  Vaginal bleeding:  no  Fetal movement:  yes    Denies RUQ pain, HA, N/V. Has questions about IOL tomorrow and if it will be painful? Had a lot of pain with IUFD delivery.     Objective:  Vitals:    23 2330 23 0444 23 0554 23 1000   BP: 135/89 133/78  130/78   BP Location:    Left arm   Patient Position:    Supine   Cuff Size: Adult Large Adult Large  Adult Large   Pulse:    95   Resp:    16   Temp: 98.8  F (37.1  C) 97.6  F (36.4  C)  98  F (36.7  C)   TempSrc: Oral Oral  Oral   SpO2:       Weight:   106.5 kg (234 lb 12.8 oz)    Height:           FETAL HEART TONES: baseline 130 .  moderate variablility, + accels, one decel--?late, Category I     NST reactive  [place]:  x1 noted    Abdomen:  Gravid, NT  Ext:  NT, no edema    NST reactive and labs stable.         ASSESSMENT / PLAN:   33 year old  at 33w6d admitted with pre-eclampsia with severe features based on BP criteria. .    Fetal well being:  -IUGR with EFW and AC 2%ile.  Elevated dopplers  -s/p betamethasone X2  -S/p NICU consult     Pre-eclampsia with severe features:  -s/p antihypertensive x 2.  -s/p magnesium.  -HELLP labs stable. Checking q12 hours     Routine:  GBS positive  Plan IOL at 34 weeks , tomorrow.   She is aware of IOL can take a few days, discussed miso, brasher and pitocin. Reviewed pain medications as needed, usually not painful during cervical ripening. Still at increased risk for need c/s for fetal intolerance and she is willing to do c/s delivery if needed.  Discussed NICU stay for baby for 3-4 weeks.      Saira Avila MD

## 2023-09-04 NOTE — PROGRESS NOTES
Patient stable this shift.  VSS.  Denies LOF, cramping/abimael or vaginal bleeding. See flow sheet for FHR and contraction pattern.  Offers no complaints.  Continue with routine cares and will notify provider if there is a change in status.

## 2023-09-04 NOTE — PROVIDER NOTIFICATION
09/04/23 0701   Provider Notification   Provider Name/Title Dr. Hercules   Method of Notification Electronic Page   Request Evaluate - Remote   Notification Reason Decels       Paged provider. Observed decel on FHR tracing. Pt to stay on monitor.

## 2023-09-04 NOTE — PLAN OF CARE
Pt has had an uneventful evening.She is asking a lot of appropriate questions about upcoming induction. She is somewhat anxious about procedures considering her last pregnancy was induced for a stillborn delivery. Conversation and questions encouraged.

## 2023-09-05 LAB
ALT SERPL W P-5'-P-CCNC: 12 U/L (ref 0–50)
AST SERPL W P-5'-P-CCNC: 20 U/L (ref 0–45)
CREAT SERPL-MCNC: 0.71 MG/DL (ref 0.51–0.95)
ERYTHROCYTE [DISTWIDTH] IN BLOOD BY AUTOMATED COUNT: 14.1 % (ref 10–15)
GFR SERPL CREATININE-BSD FRML MDRD: >90 ML/MIN/1.73M2
HCT VFR BLD AUTO: 36.3 % (ref 35–47)
HGB BLD-MCNC: 12.3 G/DL (ref 11.7–15.7)
MCH RBC QN AUTO: 33.3 PG (ref 26.5–33)
MCHC RBC AUTO-ENTMCNC: 33.9 G/DL (ref 31.5–36.5)
MCV RBC AUTO: 98 FL (ref 78–100)
PLATELET # BLD AUTO: 169 10E3/UL (ref 150–450)
RBC # BLD AUTO: 3.69 10E6/UL (ref 3.8–5.2)
WBC # BLD AUTO: 12.1 10E3/UL (ref 4–11)

## 2023-09-05 PROCEDURE — 84460 ALANINE AMINO (ALT) (SGPT): CPT | Performed by: OBSTETRICS & GYNECOLOGY

## 2023-09-05 PROCEDURE — 85027 COMPLETE CBC AUTOMATED: CPT | Performed by: OBSTETRICS & GYNECOLOGY

## 2023-09-05 PROCEDURE — 99231 SBSQ HOSP IP/OBS SF/LOW 25: CPT | Mod: 25 | Performed by: OBSTETRICS & GYNECOLOGY

## 2023-09-05 PROCEDURE — 250N000011 HC RX IP 250 OP 636

## 2023-09-05 PROCEDURE — 36415 COLL VENOUS BLD VENIPUNCTURE: CPT | Performed by: OBSTETRICS & GYNECOLOGY

## 2023-09-05 PROCEDURE — 250N000013 HC RX MED GY IP 250 OP 250 PS 637

## 2023-09-05 PROCEDURE — 250N000011 HC RX IP 250 OP 636: Mod: JZ | Performed by: OBSTETRICS & GYNECOLOGY

## 2023-09-05 PROCEDURE — 59200 INSERT CERVICAL DILATOR: CPT | Performed by: OBSTETRICS & GYNECOLOGY

## 2023-09-05 PROCEDURE — 120N000002 HC R&B MED SURG/OB UMMC

## 2023-09-05 PROCEDURE — 258N000003 HC RX IP 258 OP 636: Performed by: OBSTETRICS & GYNECOLOGY

## 2023-09-05 PROCEDURE — 84450 TRANSFERASE (AST) (SGOT): CPT | Performed by: OBSTETRICS & GYNECOLOGY

## 2023-09-05 PROCEDURE — 82565 ASSAY OF CREATININE: CPT | Performed by: OBSTETRICS & GYNECOLOGY

## 2023-09-05 RX ORDER — MAGNESIUM SULFATE HEPTAHYDRATE 40 MG/ML
4 INJECTION, SOLUTION INTRAVENOUS ONCE
Status: COMPLETED | OUTPATIENT
Start: 2023-09-05 | End: 2023-09-05

## 2023-09-05 RX ORDER — LIDOCAINE 40 MG/G
CREAM TOPICAL
Status: DISCONTINUED | OUTPATIENT
Start: 2023-09-05 | End: 2023-09-10 | Stop reason: HOSPADM

## 2023-09-05 RX ORDER — MAGNESIUM SULFATE HEPTAHYDRATE 40 MG/ML
2 INJECTION, SOLUTION INTRAVENOUS
Status: DISCONTINUED | OUTPATIENT
Start: 2023-09-05 | End: 2023-09-10 | Stop reason: HOSPADM

## 2023-09-05 RX ORDER — MAGNESIUM SULFATE IN WATER 40 MG/ML
2 INJECTION, SOLUTION INTRAVENOUS CONTINUOUS
Status: DISPENSED | OUTPATIENT
Start: 2023-09-05 | End: 2023-09-08

## 2023-09-05 RX ORDER — CALCIUM GLUCONATE 94 MG/ML
1 INJECTION, SOLUTION INTRAVENOUS
Status: DISCONTINUED | OUTPATIENT
Start: 2023-09-05 | End: 2023-09-10 | Stop reason: HOSPADM

## 2023-09-05 RX ORDER — MAGNESIUM SULFATE HEPTAHYDRATE 40 MG/ML
4 INJECTION, SOLUTION INTRAVENOUS
Status: DISCONTINUED | OUTPATIENT
Start: 2023-09-05 | End: 2023-09-10 | Stop reason: HOSPADM

## 2023-09-05 RX ORDER — SODIUM CHLORIDE, SODIUM LACTATE, POTASSIUM CHLORIDE, CALCIUM CHLORIDE 600; 310; 30; 20 MG/100ML; MG/100ML; MG/100ML; MG/100ML
10-125 INJECTION, SOLUTION INTRAVENOUS CONTINUOUS
Status: DISCONTINUED | OUTPATIENT
Start: 2023-09-05 | End: 2023-09-10 | Stop reason: HOSPADM

## 2023-09-05 RX ADMIN — POLYETHYLENE GLYCOL 3350 17 G: 17 POWDER, FOR SOLUTION ORAL at 08:41

## 2023-09-05 RX ADMIN — ASPIRIN 81 MG: 81 TABLET, COATED ORAL at 08:41

## 2023-09-05 RX ADMIN — MAGNESIUM SULFATE HEPTAHYDRATE 4 G: 40 INJECTION, SOLUTION INTRAVENOUS at 19:01

## 2023-09-05 RX ADMIN — SENNOSIDES AND DOCUSATE SODIUM 1 TABLET: 50; 8.6 TABLET ORAL at 08:41

## 2023-09-05 RX ADMIN — MAGNESIUM SULFATE HEPTAHYDRATE 2 G/HR: 40 INJECTION, SOLUTION INTRAVENOUS at 19:44

## 2023-09-05 RX ADMIN — PRENATAL VIT W/ FE FUMARATE-FA TAB 27-0.8 MG 1 TABLET: 27-0.8 TAB at 08:41

## 2023-09-05 RX ADMIN — SODIUM CHLORIDE, POTASSIUM CHLORIDE, SODIUM LACTATE AND CALCIUM CHLORIDE 50 ML/HR: 600; 310; 30; 20 INJECTION, SOLUTION INTRAVENOUS at 19:01

## 2023-09-05 RX ADMIN — LABETALOL HYDROCHLORIDE 20 MG: 5 INJECTION, SOLUTION INTRAVENOUS at 20:00

## 2023-09-05 ASSESSMENT — ACTIVITIES OF DAILY LIVING (ADL)
ADLS_ACUITY_SCORE: 18

## 2023-09-05 NOTE — PLAN OF CARE
Problem: Labor  Goal: Stable Fetal Wellbeing  Outcome: Progressing  Intervention: Promote and Monitor Fetal Wellbeing  Recent Flowsheet Documentation  Taken 9/5/2023 0815 by Kalpana Gary RN  Body Position: position changed independently     Problem: Maternal-Fetal Wellbeing  Goal: Optimal Maternal-Fetal Wellbeing  Intervention: Support Psychosocial Response to Complications During Pregnancy  Recent Flowsheet Documentation  Taken 9/5/2023 0815 by Kalpana Gary RN  Family/Support System Care: self-care encouraged       Patient rested during shift while waiting IOL. Patient Vitals stable, noted some HTN but within baseline for patient's HX of pre E W/SF.     Cook Cath placed by MD with 75ml of sterile water in cervical balloon. Only one balloon inflated at this time.     Plan is to start Mag for duration of IOL, Labor and 24 hours post delivery.     EFM noted baseline 130, accel's, no decel's and moderate variability.

## 2023-09-05 NOTE — PLAN OF CARE
Data: Patient admitted to room 442 at 0730. Patient is a . Prenatal record reviewed.   Gestational age 34w0d. Vital signs per doc flowsheet. Fetal movement present. Patient reports Hypertension   as reason for admission. Support person: Spouse, present.  Action: Report from KAI Caldwell obtained at 0710. Care of patient assumed at 0710. Verbal consent for EFM, external fetal monitors applied. Admission assessment completed. Patient and support persons educated on labor process. Patient instructed to report change in fetal movement, contractions, vaginal leaking of fluid or bleeding, abdominal pain, or any concerns related to the pregnancy to her nurse/physician. Patient oriented to room, call light in reach.   Response: Dr. Kayla Brown MD informed of Patient arrival from [facility] to L&D, reactive strip. Plan per provider is hold induction due to NICU capacity/closed status. Plan to start with John balloon around 1700 pending NICU status. Patient verbalized understanding of education and verbalized agreement with plan. Patient plans to eat, drink, ambulate, and relax throughout the day.

## 2023-09-05 NOTE — PLAN OF CARE
Patient rested comfortably through the night. VSS, see flowsheets for details. Patient denies leaking of fluid, contractions, HA, changes in vision, RUQ pain, and bleeding. Continue with plan for IOL this morning. Report given to KAI Ureña and KAI Wallis.

## 2023-09-05 NOTE — PROGRESS NOTES
"SPIRITUAL HEALTH SERVICES Progress Note  [facility] ([facility]) [facility]    Saw pt Malena Thomas per LOS.    Patient/Family Understanding of Illness and Goals of Care - Malena will be induced today and likely be giving birth today/tomorrow. She is 34 weeks pregnant and has severe preeclampsia.    Distress and Loss - During our visit, Malena shared that she was carrying some worries on Friday that have become less burdensome over the weekend. She thinks about not being next to her baby a lot and is grieving possibly not being able to \"hold\" her baby.     Strengths, Coping, and Resources - Malena expressed that she and her , Charles have had two losses and that they are excited to meet their daughter whom they've chosen the name, Danna. Malena also shared she got to see the NICU and feels a little bit better knowing where her daughter will be.     Meaning, Beliefs, and Spirituality - Malena welcomed prayers for her daughter and for an easy labor and delivery which I offered. I let her know I can also provide Sapphire water for her should she need which she accepted.     Plan of Care - Malena knows how to request a  through her nurse and I am available to follow up upon need/request.     Radha Hallsame  Staff    Pager [phone]    * [facility] remains available 24/7 for emergent requests/referrals, either by having the switchboard page the on-call  or by entering an ASAP/STAT consult in Epic (this will also page the on-call ). Routine Epic consults receive an initial response within 24 hours.*    "

## 2023-09-05 NOTE — PROGRESS NOTES
[facility] PROGRESS NOTE:   2023   6:40 PM         SUBJECTIVE:   Patient c/o nothing.    Contractions:  q 0 minutes  Leakage of fluid:  No  Vaginal bleeding:  No  Pain controlled:  Yes           OBJECTIVE:     Vitals:    23 0815 23 0845 23 1255 23 1640   BP: 139/87 110/57 131/85 (!) 142/96   BP Location: Left arm Left arm Left arm Left arm   Patient Position: Semi-Cheney's Semi-Cheney's Semi-Cheney's Semi-Cheney's   Cuff Size: Adult Large Adult Large Adult Large Adult Large   Pulse: 71  68 66   Resp: 22  20 26   Temp: 98.8  F (37.1  C)  98.4  F (36.9  C) 98.4  F (36.9  C)   TempSrc: Oral  Oral Oral   SpO2:       Weight:       Height:             NST:  Fetal Heart Rate Tracing:   Category 1  Baseline: 130  Variability: Moderate  Accels, no decels    Tocometer: No contractions    Abdomen:  gravid, NT  Cervix:   Dilation: closed   Effacement: 50%   Station:-3   Consistency: average   Position: Mid           LABS:   No results found for this or any previous visit (from the past 12 hour(s)).           ASSESSMENT/PLAN:   33 year old  at 34w0d admitted with pre-eclampsia with severe features based on BP criteria. .     Fetal well being:  -IUGR with EFW and AC 2%ile.  Elevated dopplers  -s/p betamethasone X2  -S/p NICU consult     Pre-eclampsia with severe features:  -s/p antihypertensive x 2 at admission.  -s/p magnesium. Restarting for induction.   -HELLP labs stable. Checking q24 hours    Component      Latest Ref Rng 2023  6:35 AM   WBC      4.0 - 11.0 10e3/uL 12.1 (H)    RBC Count      3.80 - 5.20 10e6/uL 3.69 (L)    Hemoglobin      11.7 - 15.7 g/dL 12.3    Hematocrit      35.0 - 47.0 % 36.3    MCV      78 - 100 fL 98    MCH      26.5 - 33.0 pg 33.3 (H)    MCHC      31.5 - 36.5 g/dL 33.9    RDW      10.0 - 15.0 % 14.1    Platelet Count      150 - 450 10e3/uL 169    Creatinine      0.51 - 0.95 mg/dL 0.71    GFR Estimate      >60 mL/min/1.73m2 >90    ALT       0 - 50 U/L 12    AST      0 - 45 U/L 20       Legend:  (H) High  (L) Low     Routine:  GBS positive  Plan IOL at 34 weeks 9/5, today. Discussed NICU closed previously today, so deferred IOL to evening.   She is aware of IOL can take a few days, discussed miso, brasher and pitocin. Reviewed pain medications as needed, usually not painful during cervical ripening. Still at increased risk for need c/s for fetal intolerance and she is willing to do c/s delivery if needed.    Placed cook with uterine balloon only due to lack of stylets for brasher. Consider pitocin after 3-6 hours    Procedure:  The patient was placed in dorsal lithotomy. A speculum was placed and the cervix was visualized. A ring forceps was used to guide the Cook tip through the cervix. The balloon was filled with 75 ml saline.  The patient tolerated well.       Kayla Brown MD

## 2023-09-06 LAB
AST SERPL W P-5'-P-CCNC: 23 U/L (ref 0–45)
CREAT SERPL-MCNC: 0.68 MG/DL (ref 0.51–0.95)
ERYTHROCYTE [DISTWIDTH] IN BLOOD BY AUTOMATED COUNT: 13.8 % (ref 10–15)
GFR SERPL CREATININE-BSD FRML MDRD: >90 ML/MIN/1.73M2
HCT VFR BLD AUTO: 37.9 % (ref 35–47)
HGB BLD-MCNC: 13.3 G/DL (ref 11.7–15.7)
MCH RBC QN AUTO: 33.8 PG (ref 26.5–33)
MCHC RBC AUTO-ENTMCNC: 35.1 G/DL (ref 31.5–36.5)
MCV RBC AUTO: 96 FL (ref 78–100)
PLATELET # BLD AUTO: 177 10E3/UL (ref 150–450)
RBC # BLD AUTO: 3.93 10E6/UL (ref 3.8–5.2)
WBC # BLD AUTO: 16.7 10E3/UL (ref 4–11)

## 2023-09-06 PROCEDURE — 10907ZC DRAINAGE OF AMNIOTIC FLUID, THERAPEUTIC FROM PRODUCTS OF CONCEPTION, VIA NATURAL OR ARTIFICIAL OPENING: ICD-10-PCS | Performed by: OBSTETRICS & GYNECOLOGY

## 2023-09-06 PROCEDURE — 250N000011 HC RX IP 250 OP 636

## 2023-09-06 PROCEDURE — 99231 SBSQ HOSP IP/OBS SF/LOW 25: CPT | Performed by: OBSTETRICS & GYNECOLOGY

## 2023-09-06 PROCEDURE — 84450 TRANSFERASE (AST) (SGOT): CPT | Performed by: OBSTETRICS & GYNECOLOGY

## 2023-09-06 PROCEDURE — 82565 ASSAY OF CREATININE: CPT | Performed by: OBSTETRICS & GYNECOLOGY

## 2023-09-06 PROCEDURE — 250N000011 HC RX IP 250 OP 636: Mod: JZ | Performed by: OBSTETRICS & GYNECOLOGY

## 2023-09-06 PROCEDURE — 36415 COLL VENOUS BLD VENIPUNCTURE: CPT | Performed by: OBSTETRICS & GYNECOLOGY

## 2023-09-06 PROCEDURE — 258N000003 HC RX IP 258 OP 636: Performed by: OBSTETRICS & GYNECOLOGY

## 2023-09-06 PROCEDURE — 250N000009 HC RX 250: Performed by: OBSTETRICS & GYNECOLOGY

## 2023-09-06 PROCEDURE — 120N000002 HC R&B MED SURG/OB UMMC

## 2023-09-06 PROCEDURE — 85027 COMPLETE CBC AUTOMATED: CPT | Performed by: OBSTETRICS & GYNECOLOGY

## 2023-09-06 RX ORDER — OXYTOCIN 10 [USP'U]/ML
10 INJECTION, SOLUTION INTRAMUSCULAR; INTRAVENOUS
Status: DISCONTINUED | OUTPATIENT
Start: 2023-09-06 | End: 2023-09-08

## 2023-09-06 RX ORDER — NALOXONE HYDROCHLORIDE 0.4 MG/ML
0.2 INJECTION, SOLUTION INTRAMUSCULAR; INTRAVENOUS; SUBCUTANEOUS
Status: DISCONTINUED | OUTPATIENT
Start: 2023-09-06 | End: 2023-09-07 | Stop reason: HOSPADM

## 2023-09-06 RX ORDER — NALOXONE HYDROCHLORIDE 0.4 MG/ML
0.4 INJECTION, SOLUTION INTRAMUSCULAR; INTRAVENOUS; SUBCUTANEOUS
Status: DISCONTINUED | OUTPATIENT
Start: 2023-09-06 | End: 2023-09-07 | Stop reason: HOSPADM

## 2023-09-06 RX ORDER — OXYTOCIN/0.9 % SODIUM CHLORIDE 30/500 ML
340 PLASTIC BAG, INJECTION (ML) INTRAVENOUS CONTINUOUS PRN
Status: DISCONTINUED | OUTPATIENT
Start: 2023-09-06 | End: 2023-09-07 | Stop reason: HOSPADM

## 2023-09-06 RX ORDER — CARBOPROST TROMETHAMINE 250 UG/ML
250 INJECTION, SOLUTION INTRAMUSCULAR
Status: DISCONTINUED | OUTPATIENT
Start: 2023-09-06 | End: 2023-09-07 | Stop reason: HOSPADM

## 2023-09-06 RX ORDER — MISOPROSTOL 200 UG/1
800 TABLET ORAL
Status: DISCONTINUED | OUTPATIENT
Start: 2023-09-06 | End: 2023-09-07 | Stop reason: HOSPADM

## 2023-09-06 RX ORDER — OXYTOCIN 10 [USP'U]/ML
10 INJECTION, SOLUTION INTRAMUSCULAR; INTRAVENOUS
Status: DISCONTINUED | OUTPATIENT
Start: 2023-09-06 | End: 2023-09-07 | Stop reason: HOSPADM

## 2023-09-06 RX ORDER — METHYLERGONOVINE MALEATE 0.2 MG/ML
200 INJECTION INTRAVENOUS
Status: DISCONTINUED | OUTPATIENT
Start: 2023-09-06 | End: 2023-09-07 | Stop reason: HOSPADM

## 2023-09-06 RX ORDER — MISOPROSTOL 200 UG/1
400 TABLET ORAL
Status: DISCONTINUED | OUTPATIENT
Start: 2023-09-06 | End: 2023-09-07 | Stop reason: HOSPADM

## 2023-09-06 RX ORDER — SODIUM CHLORIDE, SODIUM LACTATE, POTASSIUM CHLORIDE, CALCIUM CHLORIDE 600; 310; 30; 20 MG/100ML; MG/100ML; MG/100ML; MG/100ML
INJECTION, SOLUTION INTRAVENOUS CONTINUOUS
Status: DISCONTINUED | OUTPATIENT
Start: 2023-09-06 | End: 2023-09-07 | Stop reason: HOSPADM

## 2023-09-06 RX ORDER — METOCLOPRAMIDE HYDROCHLORIDE 5 MG/ML
10 INJECTION INTRAMUSCULAR; INTRAVENOUS EVERY 6 HOURS PRN
Status: DISCONTINUED | OUTPATIENT
Start: 2023-09-06 | End: 2023-09-07 | Stop reason: HOSPADM

## 2023-09-06 RX ORDER — METOCLOPRAMIDE 10 MG/1
10 TABLET ORAL EVERY 6 HOURS PRN
Status: DISCONTINUED | OUTPATIENT
Start: 2023-09-06 | End: 2023-09-07 | Stop reason: HOSPADM

## 2023-09-06 RX ORDER — PROCHLORPERAZINE MALEATE 10 MG
10 TABLET ORAL EVERY 6 HOURS PRN
Status: DISCONTINUED | OUTPATIENT
Start: 2023-09-06 | End: 2023-09-07 | Stop reason: HOSPADM

## 2023-09-06 RX ORDER — SODIUM CHLORIDE, SODIUM LACTATE, POTASSIUM CHLORIDE, CALCIUM CHLORIDE 600; 310; 30; 20 MG/100ML; MG/100ML; MG/100ML; MG/100ML
INJECTION, SOLUTION INTRAVENOUS CONTINUOUS PRN
Status: DISCONTINUED | OUTPATIENT
Start: 2023-09-06 | End: 2023-09-07 | Stop reason: HOSPADM

## 2023-09-06 RX ORDER — TRANEXAMIC ACID 10 MG/ML
1 INJECTION, SOLUTION INTRAVENOUS EVERY 30 MIN PRN
Status: DISCONTINUED | OUTPATIENT
Start: 2023-09-06 | End: 2023-09-07 | Stop reason: HOSPADM

## 2023-09-06 RX ORDER — CITRIC ACID/SODIUM CITRATE 334-500MG
30 SOLUTION, ORAL ORAL
Status: DISCONTINUED | OUTPATIENT
Start: 2023-09-06 | End: 2023-09-07 | Stop reason: HOSPADM

## 2023-09-06 RX ORDER — OXYTOCIN/0.9 % SODIUM CHLORIDE 30/500 ML
1-30 PLASTIC BAG, INJECTION (ML) INTRAVENOUS CONTINUOUS
Status: DISCONTINUED | OUTPATIENT
Start: 2023-09-06 | End: 2023-09-07 | Stop reason: HOSPADM

## 2023-09-06 RX ORDER — LIDOCAINE 40 MG/G
CREAM TOPICAL
Status: DISCONTINUED | OUTPATIENT
Start: 2023-09-06 | End: 2023-09-07 | Stop reason: HOSPADM

## 2023-09-06 RX ORDER — ONDANSETRON 2 MG/ML
4 INJECTION INTRAMUSCULAR; INTRAVENOUS EVERY 6 HOURS PRN
Status: DISCONTINUED | OUTPATIENT
Start: 2023-09-06 | End: 2023-09-07 | Stop reason: HOSPADM

## 2023-09-06 RX ORDER — PENICILLIN G POTASSIUM 5000000 [IU]/1
5 INJECTION, POWDER, FOR SOLUTION INTRAMUSCULAR; INTRAVENOUS ONCE
Status: COMPLETED | OUTPATIENT
Start: 2023-09-06 | End: 2023-09-06

## 2023-09-06 RX ORDER — OXYTOCIN/0.9 % SODIUM CHLORIDE 30/500 ML
100-340 PLASTIC BAG, INJECTION (ML) INTRAVENOUS CONTINUOUS PRN
Status: DISCONTINUED | OUTPATIENT
Start: 2023-09-06 | End: 2023-09-08

## 2023-09-06 RX ORDER — ONDANSETRON 4 MG/1
4 TABLET, ORALLY DISINTEGRATING ORAL EVERY 6 HOURS PRN
Status: DISCONTINUED | OUTPATIENT
Start: 2023-09-06 | End: 2023-09-07 | Stop reason: HOSPADM

## 2023-09-06 RX ORDER — IBUPROFEN 800 MG/1
800 TABLET, FILM COATED ORAL
Status: DISCONTINUED | OUTPATIENT
Start: 2023-09-06 | End: 2023-09-08

## 2023-09-06 RX ORDER — CITRIC ACID/SODIUM CITRATE 334-500MG
30 SOLUTION, ORAL ORAL ONCE
Status: DISCONTINUED | OUTPATIENT
Start: 2023-09-06 | End: 2023-09-07 | Stop reason: HOSPADM

## 2023-09-06 RX ORDER — KETOROLAC TROMETHAMINE 30 MG/ML
30 INJECTION, SOLUTION INTRAMUSCULAR; INTRAVENOUS
Status: DISCONTINUED | OUTPATIENT
Start: 2023-09-06 | End: 2023-09-08

## 2023-09-06 RX ORDER — PROCHLORPERAZINE 25 MG
25 SUPPOSITORY, RECTAL RECTAL EVERY 12 HOURS PRN
Status: DISCONTINUED | OUTPATIENT
Start: 2023-09-06 | End: 2023-09-07 | Stop reason: HOSPADM

## 2023-09-06 RX ORDER — PENICILLIN G 3000000 [IU]/50ML
3 INJECTION, SOLUTION INTRAVENOUS EVERY 4 HOURS
Status: DISCONTINUED | OUTPATIENT
Start: 2023-09-06 | End: 2023-09-07 | Stop reason: HOSPADM

## 2023-09-06 RX ADMIN — Medication 2 MILLI-UNITS/MIN: at 04:50

## 2023-09-06 RX ADMIN — PENICILLIN G 3 MILLION UNITS: 3000000 INJECTION, SOLUTION INTRAVENOUS at 13:00

## 2023-09-06 RX ADMIN — LABETALOL HYDROCHLORIDE 20 MG: 5 INJECTION, SOLUTION INTRAVENOUS at 10:45

## 2023-09-06 RX ADMIN — PENICILLIN G 3 MILLION UNITS: 3000000 INJECTION, SOLUTION INTRAVENOUS at 16:59

## 2023-09-06 RX ADMIN — MAGNESIUM SULFATE HEPTAHYDRATE 2 G/HR: 40 INJECTION, SOLUTION INTRAVENOUS at 12:52

## 2023-09-06 RX ADMIN — PENICILLIN G POTASSIUM 5 MILLION UNITS: 5000000 POWDER, FOR SOLUTION INTRAMUSCULAR; INTRAPLEURAL; INTRATHECAL; INTRAVENOUS at 09:01

## 2023-09-06 RX ADMIN — SODIUM CHLORIDE, POTASSIUM CHLORIDE, SODIUM LACTATE AND CALCIUM CHLORIDE: 600; 310; 30; 20 INJECTION, SOLUTION INTRAVENOUS at 12:56

## 2023-09-06 RX ADMIN — PENICILLIN G 3 MILLION UNITS: 3000000 INJECTION, SOLUTION INTRAVENOUS at 21:01

## 2023-09-06 ASSESSMENT — ACTIVITIES OF DAILY LIVING (ADL)
ADLS_ACUITY_SCORE: 18

## 2023-09-06 NOTE — PROGRESS NOTES
[facility] PRO[facility]ESS NOTE:   2023   3:33 AM         SUBJECTIVE:   Patient c/o nothing, contractions spacing out.    Contractions:  q 5 minutes  Leakage of fluid:  No  Vaginal bleeding:  No  Pain controlled:  Yes           OBJECTIVE:     Vitals:    23 2317 23 2330 23 0100 23 0325   BP: 136/80  (!) 148/80    BP Location:       Patient Position:       Cuff Size:       Pulse:       Resp:  18  18   Temp:  98  F (36.7  C)  97.8  F (36.6  C)   TempSrc:  Oral  Oral   SpO2:   99%    Weight:       Height:             NST:  Fetal Heart Rate Tracing:   Category 1  Baseline: 130  Variability: Moderate  Accels, no decels    Tocometer: Irregular    Abdomen:  gravid, NT  Cervix:   Dilation: 3   Effacement: 30%   Station:-3   Consistency: average   Position: Mid  John balloon in vagina         LABS:   No results found for this or any previous visit (from the past 12 hour(s)).           ASSESSMENT:   33 year old  at 34w1d   induction of labor, indication growth restriction and pre-eclampsia with severe features.  Cervical balloon expelled           PLAN:   Labor induction with Pitocin - discussed and consent held  Continue magnesium  Anticipate     Kayla Brown MD

## 2023-09-06 NOTE — PROGRESS NOTES
[facility] PRO[facility]ESS NOTE:   2023   10:33 PM         SUBJECTIVE:   Patient c/o mild contractions.    Contractions:  q 4-5 minutes  Leakage of fluid:  No  Vaginal bleeding:  No  Pain controlled:  Yes           OBJECTIVE:     Vitals:    23 2128 23 2140 23 2155 23 2210   BP:  122/66 125/68 135/71   BP Location:       Patient Position:       Cuff Size:       Pulse:       Resp:       Temp:       TempSrc:       SpO2: 100%      Weight:       Height:             NST:  Fetal Heart Rate Tracing:   Category 1  Baseline: 130  Variability: Moderate  Accels, no decels    Tocometer: q 4-5 minutes    Abdomen:  gravid, NT  Cervix:   Deferred           LABS:   No results found for this or any previous visit (from the past 12 hour(s)).           ASSESSMENT:   33 year old  at 34w0d   induction of labor, indication pre-eclampsia with severe features, fetal growth restriction.           PLAN:   Continue brasher  Felicity, will not add pitocin or miso at this time.   Had severe range BP x 2 - treated with labetalol  Continue magnesium    Kayla Brown MD

## 2023-09-06 NOTE — PROGRESS NOTES
Intrapartum Progress Note    S: Patient reports she is not feeling many contractions.  Was able to sleep overnight.  States she knows her pain control options for labor and will let us know if she would like anything when the time comes.  She denies any headache, changes in vision, chest pain, chest pressure, shortness of breath.      O: Patient Vitals for the past 4 hrs:   BP Temp Temp src Resp SpO2 Weight   23 0758 -- -- -- -- 100 % --   23 0757 (!) 142/88 -- -- -- -- --   23 0730 -- 98.3  F (36.8  C) Oral 16 100 % --   23 0700 (!) 149/91 -- -- -- -- 104.7 kg (230 lb 14.4 oz)   23 0500 (!) 155/89 -- -- -- -- --   ] Pulse- 90bpm  Gen: awake, alert, answering questions appropriately, appears comfortable laying on left lateral side.    CV- regular rate  Lung- breathing comfortably on room air    FHT:   Baseline 121 bpm  Variability moderate  Accels Present  Decels Absent     [place]: 2-3 contractions in 10 min    Membranes: intact    A/P: 33 year old  at 34w1d by 7w1d us who is undergoing induction of labor for pre-e with severe features, currently on pitocin.    Induction of labor:  S/p cook catheter.  Last cervical check at 0330 was 3/30/-3  Currently on pitocin at 10 mu/min.  Continue to titrate per protocol.  Patient is GBS positive.  PCN starting now.  Will plan for cervical exam and assessment of cervix and for AROM at 1300.  Discussed plan with patient who is in agreement.  Pain- patient aware of options.  States she will ask for pain medications if she desires them when the time comes.      Pre-e with severe features:  Mild range BP over past 4 hours.  Last IV labetalol 20mg on 23 at 2000.  Mag 4g load at 1901 on > Mag at 2g/hr  UOP 200ml/hr, adequate  Continue magnesium until 24 hours postpartum.  Immediate acting antihypertensives PRN for sustained severe range BPs.    FWB:  Severe FGR with EFW of 1590g on 23, 2%tile  FHT Category 1, reactive and  reassuring.    Cindy Rincon MD 8:59 AM

## 2023-09-06 NOTE — PROVIDER NOTIFICATION
09/06/23 1720   Provider Notification   Provider Name/Title Dr. ERIK Rincon   Method of Notification In Department   Request Evaluate - Remote   Notification Reason Labor Status     Provider notified that pt appears to be sleeping through contractions. Per provider plan to do SVE at 1945 and reevaluate plan.

## 2023-09-06 NOTE — PROVIDER NOTIFICATION
09/06/23 1026 09/06/23 1030 09/06/23 1035   Vital Signs   Oximeter Heart Rate 93 bpm 90 bpm 86 bpm   BP (!) 172/104  --   --    BP Location Right arm  --   --    Patient Position Right side  --   --    Cuff Size Adult Large  --   --       09/06/23 1042   Vital Signs   Oximeter Heart Rate 90 bpm   BP (!) 164/100   BP Location  --    Patient Position  --    Cuff Size  --      Provider notified of sustained elevated BP. Plan to admin 20mg of labetalol IV. BP q10.

## 2023-09-06 NOTE — PROVIDER NOTIFICATION
"   09/05/23 1950   Provider Notification   Provider Name/Title Dr Brown   Method of Notification Electronic Page   Request Evaluate - Remote   Notification Reason Maternal Vital Sign Change     \"0750 /106. Please call with treatment orders.\"    Treated with 20mg of labetolol per provider orders following OB hypertension algorithm. Pt BP responding well. See flowsheets for details.   "

## 2023-09-06 NOTE — PLAN OF CARE
Magnesium was started but patient complained of IV site pain. Flushed with NS and noted palpable thrill for flushing. No noted infiltration. Some noted bruising. Due to patient continued complaint of pain, new IV was placed in left arm with noted good draw and flush.     Mag restarted at 1910.    Report given to Иван. Noted during report patient had severe range BP. This was communicated to Иван for continued management.

## 2023-09-06 NOTE — PROGRESS NOTES
Intrapartum Progress Note    S: Patient states the contractions are becoming more painful.  She denies any headache, changes in vision, chest pain, chest pressure, shortness of breath.      O: Patient Vitals for the past 4 hrs:   BP Temp Temp src Resp SpO2   09/06/23 1239 -- -- -- 16 --   09/06/23 1235 -- -- -- -- 98 %   09/06/23 1230 -- -- -- -- 99 %   09/06/23 1217 (!) 153/92 -- -- -- --   09/06/23 1215 -- -- -- -- 98 %   09/06/23 1210 -- -- -- -- 99 %   09/06/23 1205 -- -- -- -- 99 %   09/06/23 1203 (!) 160/97 -- -- -- --   09/06/23 1200 -- -- -- -- 98 %   09/06/23 1150 -- -- -- -- 98 %   09/06/23 1145 -- -- -- -- 97 %   09/06/23 1143 (!) 148/93 -- -- -- --   09/06/23 1140 -- -- -- -- 98 %   09/06/23 1135 -- -- -- -- 100 %   09/06/23 1131 (!) 150/92 -- -- -- --   09/06/23 1130 -- -- -- -- 97 %   09/06/23 1125 -- -- -- -- 98 %   09/06/23 1121 (!) 155/90 -- -- 16 --   09/06/23 1120 -- -- -- -- 99 %   09/06/23 1115 -- -- -- -- 99 %   09/06/23 1111 (!) 152/94 -- -- -- --   09/06/23 1105 -- -- -- -- 99 %   09/06/23 1101 (!) 139/90 -- -- -- --   09/06/23 1100 -- 97.4  F (36.3  C) Oral -- --   09/06/23 1055 -- -- -- -- 99 %   09/06/23 1050 (!) 145/88 -- -- -- 99 %   09/06/23 1048 -- -- -- -- 94 %   09/06/23 1045 -- -- -- -- 100 %   09/06/23 1042 (!) 164/100 -- -- -- --   09/06/23 1035 -- -- -- -- 100 %   09/06/23 1030 -- -- -- -- 100 %   09/06/23 1026 (!) 172/104 -- -- 18 --   09/06/23 0947 -- -- -- -- 96 %   09/06/23 0942 -- -- -- -- 97 %   09/06/23 0937 -- -- -- -- 98 %   09/06/23 0932 -- -- -- -- 97 %   09/06/23 0927 -- -- -- -- 98 %   09/06/23 0922 -- -- -- -- 100 %   09/06/23 0917 -- -- -- -- 100 %       Gen: awake, alert, answering questions appropriately, appears comfortable   CV- regular rate  Lung- breathing comfortably on room air  Cervix- 3/long/high/posterior/medium    FHT:   Baseline 12 bpm  Variability moderate  Accels Present  Decels Absent     [place]:not tracing well    Membranes: intact    A/P: 33 year  old  at 34w1d by 7w1d us who is undergoing induction of labor for pre-e with severe features, currently on pitocin.    Induction of labor:  S/p cook catheter.   Currently on pitocin at 20 mu/min.  Continue to titrate per protocol.  Patient is GBS positive.    PCN started at 0901  Head not well applied to cervix, thus AROM not able to be performed   Pain- patient aware of options.  States she will ask for pain medications if she desires them when the time comes.      Pre-e with severe features:  Mild range BP over past 4 hours.  Last IV labetalol 20mg today at 1045 for sustained severe range BP  Mag 4g load at 1901 on > Mag at 2g/hr  UOP 200ml/hr  Continue magnesium until 24 hours postpartum.  Immediate acting antihypertensives PRN for sustained severe range BPs.    FWB:  Severe FGR with EFW of 1590g on 23, 2%tile  FHT Category 1, reactive and reassuring.    Cindy Rincon MD 1300

## 2023-09-06 NOTE — PLAN OF CARE
Afebrile. Elevated blood pressures throughout day treated with labetalol, see MAR. BP now stable. Pt reports active fetal movement and intermittent pain with contractions. Pt denies LOF, bleeding, headache, vision changes, RUQ pain, shortness of breath, or chest pain. EFM as charted. Labor support measures include hot packs, repositioning, and spouse at the bedside. Report given to Delores QUINN.

## 2023-09-06 NOTE — PLAN OF CARE
Severe range BP noted during beginning of shift. See flowsheets and MAR for details. BP now stable. All other VSS overnight. Denies headache, RUQ pain, shortness of breath, dizziness, changes in vision, and chest pain. 2+ reflexes and no clonus overnight. Magnesium and pitocin running. See MAR for details. Denies any leaking of fluid or vaginal bleeding. Last SVE at 0330- 3/30/-3. EFM as charted. Pt resting comfortably through contractions. Spouse at the bedside and attentive to pt. All questions and concerns addressed. Questions encouraged. Pt agreeable with plan of care and states no further questions at this time.

## 2023-09-06 NOTE — PROGRESS NOTES
Intrapartum Progress Note    S: Patient reports she is starting to feel worsening contraction pain.    O: Patient Vitals for the past 4 hrs:   BP Temp Temp src Resp SpO2   09/06/23 1543 -- -- -- -- 100 %   09/06/23 1538 -- -- -- -- 100 %   09/06/23 1533 -- -- -- -- 99 %   09/06/23 1528 -- -- -- -- 100 %   09/06/23 1523 -- -- -- -- 100 %   09/06/23 1520 -- 97.7  F (36.5  C) Oral 16 100 %   09/06/23 1513 -- -- -- -- 100 %   09/06/23 1508 -- -- -- -- 99 %   09/06/23 1503 -- -- -- -- 99 %   09/06/23 1458 -- -- -- -- 100 %   09/06/23 1453 -- -- -- -- 99 %   09/06/23 1448 (!) 141/95 -- -- -- 100 %   09/06/23 1443 -- -- -- -- 98 %   09/06/23 1438 -- -- -- -- 99 %   09/06/23 1433 -- -- -- -- 99 %   09/06/23 1428 -- -- -- -- 97 %   09/06/23 1423 -- -- -- -- 97 %   09/06/23 1418 -- -- -- -- 98 %   09/06/23 1417 (!) 145/87 -- -- 16 --   09/06/23 1413 -- -- -- -- 98 %   09/06/23 1408 -- -- -- -- 97 %   09/06/23 1403 -- -- -- -- 98 %   09/06/23 1402 (!) 145/92 -- -- -- --   09/06/23 1348 -- -- -- -- 99 %   09/06/23 1346 (!) 144/90 98.4  F (36.9  C) Oral 16 --   09/06/23 1343 -- -- -- -- 100 %   09/06/23 1338 -- -- -- -- 100 %   09/06/23 1337 (!) 150/94 -- -- -- --   09/06/23 1310 -- -- -- -- 100 %   09/06/23 1305 -- -- -- -- 100 %   09/06/23 1302 (!) 142/79 -- -- -- --   09/06/23 1300 -- -- -- -- 100 %   09/06/23 1239 -- -- -- 16 --   09/06/23 1235 -- -- -- -- 98 %   09/06/23 1230 -- -- -- -- 99 %   09/06/23 1217 (!) 153/92 -- -- -- --   09/06/23 1215 -- -- -- -- 98 %   09/06/23 1210 -- -- -- -- 99 %   09/06/23 1205 -- -- -- -- 99 %   09/06/23 1203 (!) 160/97 -- -- -- --   09/06/23 1200 -- -- -- -- 98 %     Gen: awake, alert, answering questions appropriately, appears comfortable   CV- regular rate  Lung- breathing comfortably on room air  Cervix- 3/50/high/ mid/ medium at 1530    FHT:   Baseline 120 bpm   Variability moderate  Accels Present  Decels Absent     [place]:not tracing well    Membranes: intact    A/P: 33 year old   at 34w1d by 7w1d us who is undergoing induction of labor for pre-e with severe features, currently on pitocin.    Induction of labor:  S/p cook catheter.   Currently on pitocin at 22 mu/min.  Continue to titrate per protocol.  Patient is GBS positive, adequate on PCN  Head not well applied to cervix, thus AROM not able to be performed.  Cervix does feel softer and more effaced compared to last check.   Pain- patient aware of options.  States she will ask for pain medications if she desires them when the time comes.      Pre-e with severe features:  Mild range BP over past 4 hours.  Last IV labetalol 20mg today at 1045 for sustained severe range BP  Mag 4g load at 1901 on > Mag at 2g/hr  UOP 200ml/hr  Continue magnesium until 24 hours postpartum.  Immediate acting antihypertensives PRN for sustained severe range BPs.    FWB:  Severe FGR with EFW of 1590g on 23, 2%tile  FHT Category 1, reactive and reassuring.    Cindy Rincon MD [id number]

## 2023-09-07 ENCOUNTER — ANESTHESIA EVENT (OUTPATIENT)
Dept: OBGYN | Facility: CLINIC | Age: 33
End: 2023-09-07
Payer: MEDICAID

## 2023-09-07 ENCOUNTER — ANESTHESIA (OUTPATIENT)
Dept: OBGYN | Facility: CLINIC | Age: 33
End: 2023-09-07
Payer: MEDICAID

## 2023-09-07 LAB
ALT SERPL W P-5'-P-CCNC: 10 U/L (ref 0–50)
AST SERPL W P-5'-P-CCNC: 20 U/L (ref 0–45)
CREAT SERPL-MCNC: 0.63 MG/DL (ref 0.51–0.95)
EGFRCR SERPLBLD CKD-EPI 2021: >90 ML/MIN/1.73M2
ERYTHROCYTE [DISTWIDTH] IN BLOOD BY AUTOMATED COUNT: 13.9 % (ref 10–15)
HCT VFR BLD AUTO: 39.4 % (ref 35–47)
HGB BLD-MCNC: 13.6 G/DL (ref 11.7–15.7)
MCH RBC QN AUTO: 33.3 PG (ref 26.5–33)
MCHC RBC AUTO-ENTMCNC: 34.5 G/DL (ref 31.5–36.5)
MCV RBC AUTO: 97 FL (ref 78–100)
PLATELET # BLD AUTO: 170 10E3/UL (ref 150–450)
RBC # BLD AUTO: 4.08 10E6/UL (ref 3.8–5.2)
WBC # BLD AUTO: 18.8 10E3/UL (ref 4–11)

## 2023-09-07 PROCEDURE — C1765 ADHESION BARRIER: HCPCS | Performed by: OBSTETRICS & GYNECOLOGY

## 2023-09-07 PROCEDURE — 258N000003 HC RX IP 258 OP 636: Performed by: STUDENT IN AN ORGANIZED HEALTH CARE EDUCATION/TRAINING PROGRAM

## 2023-09-07 PROCEDURE — 120N000002 HC R&B MED SURG/OB UMMC

## 2023-09-07 PROCEDURE — 370N000017 HC ANESTHESIA TECHNICAL FEE, PER MIN: Performed by: OBSTETRICS & GYNECOLOGY

## 2023-09-07 PROCEDURE — 250N000011 HC RX IP 250 OP 636

## 2023-09-07 PROCEDURE — 250N000011 HC RX IP 250 OP 636: Mod: JZ | Performed by: STUDENT IN AN ORGANIZED HEALTH CARE EDUCATION/TRAINING PROGRAM

## 2023-09-07 PROCEDURE — 250N000013 HC RX MED GY IP 250 OP 250 PS 637: Performed by: OBSTETRICS & GYNECOLOGY

## 2023-09-07 PROCEDURE — 250N000011 HC RX IP 250 OP 636: Performed by: OBSTETRICS & GYNECOLOGY

## 2023-09-07 PROCEDURE — 85027 COMPLETE CBC AUTOMATED: CPT | Performed by: OBSTETRICS & GYNECOLOGY

## 2023-09-07 PROCEDURE — 84450 TRANSFERASE (AST) (SGOT): CPT | Performed by: OBSTETRICS & GYNECOLOGY

## 2023-09-07 PROCEDURE — 250N000011 HC RX IP 250 OP 636: Mod: JZ | Performed by: OBSTETRICS & GYNECOLOGY

## 2023-09-07 PROCEDURE — 59515 CESAREAN DELIVERY: CPT | Mod: GC | Performed by: OBSTETRICS & GYNECOLOGY

## 2023-09-07 PROCEDURE — 250N000009 HC RX 250

## 2023-09-07 PROCEDURE — 88307 TISSUE EXAM BY PATHOLOGIST: CPT | Mod: TC | Performed by: OBSTETRICS & GYNECOLOGY

## 2023-09-07 PROCEDURE — C9290 INJ, BUPIVACAINE LIPOSOME: HCPCS | Performed by: STUDENT IN AN ORGANIZED HEALTH CARE EDUCATION/TRAINING PROGRAM

## 2023-09-07 PROCEDURE — 250N000011 HC RX IP 250 OP 636: Performed by: STUDENT IN AN ORGANIZED HEALTH CARE EDUCATION/TRAINING PROGRAM

## 2023-09-07 PROCEDURE — 36415 COLL VENOUS BLD VENIPUNCTURE: CPT | Performed by: OBSTETRICS & GYNECOLOGY

## 2023-09-07 PROCEDURE — 82565 ASSAY OF CREATININE: CPT | Performed by: OBSTETRICS & GYNECOLOGY

## 2023-09-07 PROCEDURE — 258N000003 HC RX IP 258 OP 636: Performed by: OBSTETRICS & GYNECOLOGY

## 2023-09-07 PROCEDURE — 250N000009 HC RX 250: Performed by: OBSTETRICS & GYNECOLOGY

## 2023-09-07 PROCEDURE — 360N000076 HC SURGERY LEVEL 3, PER MIN: Performed by: OBSTETRICS & GYNECOLOGY

## 2023-09-07 PROCEDURE — 271N000001 HC OR GENERAL SUPPLY NON-STERILE: Performed by: OBSTETRICS & GYNECOLOGY

## 2023-09-07 PROCEDURE — 84460 ALANINE AMINO (ALT) (SGPT): CPT | Performed by: OBSTETRICS & GYNECOLOGY

## 2023-09-07 PROCEDURE — 710N000010 HC RECOVERY PHASE 1, LEVEL 2, PER MIN: Performed by: OBSTETRICS & GYNECOLOGY

## 2023-09-07 PROCEDURE — 272N000001 HC OR GENERAL SUPPLY STERILE: Performed by: OBSTETRICS & GYNECOLOGY

## 2023-09-07 RX ORDER — SODIUM CHLORIDE, SODIUM LACTATE, POTASSIUM CHLORIDE, CALCIUM CHLORIDE 600; 310; 30; 20 MG/100ML; MG/100ML; MG/100ML; MG/100ML
INJECTION, SOLUTION INTRAVENOUS CONTINUOUS
Status: DISCONTINUED | OUTPATIENT
Start: 2023-09-07 | End: 2023-09-07 | Stop reason: HOSPADM

## 2023-09-07 RX ORDER — OXYCODONE HYDROCHLORIDE 5 MG/1
5 TABLET ORAL EVERY 4 HOURS PRN
Status: DISCONTINUED | OUTPATIENT
Start: 2023-09-07 | End: 2023-09-10 | Stop reason: HOSPADM

## 2023-09-07 RX ORDER — LIDOCAINE 40 MG/G
CREAM TOPICAL
Status: CANCELLED | OUTPATIENT
Start: 2023-09-07

## 2023-09-07 RX ORDER — BUPIVACAINE HYDROCHLORIDE 2.5 MG/ML
INJECTION, SOLUTION EPIDURAL; INFILTRATION; INTRACAUDAL
Status: DISCONTINUED | OUTPATIENT
Start: 2023-09-07 | End: 2023-09-07

## 2023-09-07 RX ORDER — OXYTOCIN/0.9 % SODIUM CHLORIDE 30/500 ML
PLASTIC BAG, INJECTION (ML) INTRAVENOUS CONTINUOUS PRN
Status: DISCONTINUED | OUTPATIENT
Start: 2023-09-07 | End: 2023-09-07

## 2023-09-07 RX ORDER — METOCLOPRAMIDE HYDROCHLORIDE 5 MG/ML
10 INJECTION INTRAMUSCULAR; INTRAVENOUS EVERY 6 HOURS PRN
Status: DISCONTINUED | OUTPATIENT
Start: 2023-09-07 | End: 2023-09-10 | Stop reason: HOSPADM

## 2023-09-07 RX ORDER — FENTANYL/ROPIVACAINE/NS/PF 2MCG/ML-.1
PLASTIC BAG, INJECTION (ML) EPIDURAL
Status: DISCONTINUED
Start: 2023-09-07 | End: 2023-09-07 | Stop reason: HOSPADM

## 2023-09-07 RX ORDER — CARBOPROST TROMETHAMINE 250 UG/ML
250 INJECTION, SOLUTION INTRAMUSCULAR
Status: DISCONTINUED | OUTPATIENT
Start: 2023-09-07 | End: 2023-09-07 | Stop reason: HOSPADM

## 2023-09-07 RX ORDER — ONDANSETRON 2 MG/ML
4 INJECTION INTRAMUSCULAR; INTRAVENOUS EVERY 6 HOURS PRN
Status: DISCONTINUED | OUTPATIENT
Start: 2023-09-07 | End: 2023-09-07 | Stop reason: HOSPADM

## 2023-09-07 RX ORDER — LIDOCAINE HYDROCHLORIDE 10 MG/ML
INJECTION, SOLUTION EPIDURAL; INFILTRATION; INTRACAUDAL; PERINEURAL
Status: DISCONTINUED
Start: 2023-09-07 | End: 2023-09-07 | Stop reason: HOSPADM

## 2023-09-07 RX ORDER — OXYTOCIN/0.9 % SODIUM CHLORIDE 30/500 ML
340 PLASTIC BAG, INJECTION (ML) INTRAVENOUS CONTINUOUS PRN
Status: DISCONTINUED | OUTPATIENT
Start: 2023-09-07 | End: 2023-09-10 | Stop reason: HOSPADM

## 2023-09-07 RX ORDER — AZITHROMYCIN 500 MG/5ML
500 INJECTION, POWDER, LYOPHILIZED, FOR SOLUTION INTRAVENOUS
Status: COMPLETED | OUTPATIENT
Start: 2023-09-07 | End: 2023-09-07

## 2023-09-07 RX ORDER — NALOXONE HYDROCHLORIDE 0.4 MG/ML
0.4 INJECTION, SOLUTION INTRAMUSCULAR; INTRAVENOUS; SUBCUTANEOUS
Status: DISCONTINUED | OUTPATIENT
Start: 2023-09-07 | End: 2023-09-10 | Stop reason: HOSPADM

## 2023-09-07 RX ORDER — FENTANYL CITRATE-0.9 % NACL/PF 10 MCG/ML
100 PLASTIC BAG, INJECTION (ML) INTRAVENOUS EVERY 5 MIN PRN
Status: DISCONTINUED | OUTPATIENT
Start: 2023-09-07 | End: 2023-09-07 | Stop reason: HOSPADM

## 2023-09-07 RX ORDER — MORPHINE SULFATE 1 MG/ML
INJECTION, SOLUTION EPIDURAL; INTRATHECAL; INTRAVENOUS PRN
Status: DISCONTINUED | OUTPATIENT
Start: 2023-09-07 | End: 2023-09-07

## 2023-09-07 RX ORDER — MAGNESIUM SULFATE HEPTAHYDRATE 40 MG/ML
2 INJECTION, SOLUTION INTRAVENOUS
Status: CANCELLED | OUTPATIENT
Start: 2023-09-07

## 2023-09-07 RX ORDER — HYDRALAZINE HYDROCHLORIDE 20 MG/ML
10 INJECTION INTRAMUSCULAR; INTRAVENOUS
Status: CANCELLED | OUTPATIENT
Start: 2023-09-07

## 2023-09-07 RX ORDER — OXYTOCIN/0.9 % SODIUM CHLORIDE 30/500 ML
100-340 PLASTIC BAG, INJECTION (ML) INTRAVENOUS CONTINUOUS PRN
Status: DISCONTINUED | OUTPATIENT
Start: 2023-09-07 | End: 2023-09-10 | Stop reason: HOSPADM

## 2023-09-07 RX ORDER — AMOXICILLIN 250 MG
2 CAPSULE ORAL 2 TIMES DAILY
Status: DISCONTINUED | OUTPATIENT
Start: 2023-09-07 | End: 2023-09-10 | Stop reason: HOSPADM

## 2023-09-07 RX ORDER — OXYTOCIN 10 [USP'U]/ML
10 INJECTION, SOLUTION INTRAMUSCULAR; INTRAVENOUS
Status: DISCONTINUED | OUTPATIENT
Start: 2023-09-07 | End: 2023-09-07 | Stop reason: HOSPADM

## 2023-09-07 RX ORDER — ONDANSETRON 4 MG/1
4 TABLET, ORALLY DISINTEGRATING ORAL EVERY 6 HOURS PRN
Status: DISCONTINUED | OUTPATIENT
Start: 2023-09-07 | End: 2023-09-10 | Stop reason: HOSPADM

## 2023-09-07 RX ORDER — SODIUM CHLORIDE, SODIUM LACTATE, POTASSIUM CHLORIDE, CALCIUM CHLORIDE 600; 310; 30; 20 MG/100ML; MG/100ML; MG/100ML; MG/100ML
INJECTION, SOLUTION INTRAVENOUS CONTINUOUS PRN
Status: DISCONTINUED | OUTPATIENT
Start: 2023-09-07 | End: 2023-09-07

## 2023-09-07 RX ORDER — NALOXONE HYDROCHLORIDE 0.4 MG/ML
0.2 INJECTION, SOLUTION INTRAMUSCULAR; INTRAVENOUS; SUBCUTANEOUS
Status: DISCONTINUED | OUTPATIENT
Start: 2023-09-07 | End: 2023-09-10 | Stop reason: HOSPADM

## 2023-09-07 RX ORDER — TRANEXAMIC ACID 10 MG/ML
1 INJECTION, SOLUTION INTRAVENOUS EVERY 30 MIN PRN
Status: DISCONTINUED | OUTPATIENT
Start: 2023-09-07 | End: 2023-09-07 | Stop reason: HOSPADM

## 2023-09-07 RX ORDER — MISOPROSTOL 200 UG/1
400 TABLET ORAL
Status: DISCONTINUED | OUTPATIENT
Start: 2023-09-07 | End: 2023-09-10 | Stop reason: HOSPADM

## 2023-09-07 RX ORDER — CITRIC ACID/SODIUM CITRATE 334-500MG
30 SOLUTION, ORAL ORAL
Status: COMPLETED | OUTPATIENT
Start: 2023-09-07 | End: 2023-09-07

## 2023-09-07 RX ORDER — IBUPROFEN 800 MG/1
800 TABLET, FILM COATED ORAL EVERY 6 HOURS
Status: DISCONTINUED | OUTPATIENT
Start: 2023-09-08 | End: 2023-09-10 | Stop reason: HOSPADM

## 2023-09-07 RX ORDER — SIMETHICONE 80 MG
80 TABLET,CHEWABLE ORAL 4 TIMES DAILY PRN
Status: DISCONTINUED | OUTPATIENT
Start: 2023-09-07 | End: 2023-09-10 | Stop reason: HOSPADM

## 2023-09-07 RX ORDER — MISOPROSTOL 200 UG/1
TABLET ORAL
Status: DISCONTINUED
Start: 2023-09-07 | End: 2023-09-07 | Stop reason: HOSPADM

## 2023-09-07 RX ORDER — ONDANSETRON 4 MG/1
4 TABLET, ORALLY DISINTEGRATING ORAL EVERY 6 HOURS PRN
Status: DISCONTINUED | OUTPATIENT
Start: 2023-09-07 | End: 2023-09-07 | Stop reason: HOSPADM

## 2023-09-07 RX ORDER — BISACODYL 10 MG
10 SUPPOSITORY, RECTAL RECTAL DAILY PRN
Status: DISCONTINUED | OUTPATIENT
Start: 2023-09-09 | End: 2023-09-10 | Stop reason: HOSPADM

## 2023-09-07 RX ORDER — ONDANSETRON 2 MG/ML
4 INJECTION INTRAMUSCULAR; INTRAVENOUS EVERY 6 HOURS PRN
Status: DISCONTINUED | OUTPATIENT
Start: 2023-09-07 | End: 2023-09-10 | Stop reason: HOSPADM

## 2023-09-07 RX ORDER — TRANEXAMIC ACID 10 MG/ML
1 INJECTION, SOLUTION INTRAVENOUS EVERY 30 MIN PRN
Status: DISCONTINUED | OUTPATIENT
Start: 2023-09-07 | End: 2023-09-10 | Stop reason: HOSPADM

## 2023-09-07 RX ORDER — DEXTROSE, SODIUM CHLORIDE, SODIUM LACTATE, POTASSIUM CHLORIDE, AND CALCIUM CHLORIDE 5; .6; .31; .03; .02 G/100ML; G/100ML; G/100ML; G/100ML; G/100ML
INJECTION, SOLUTION INTRAVENOUS CONTINUOUS
Status: DISCONTINUED | OUTPATIENT
Start: 2023-09-07 | End: 2023-09-10 | Stop reason: HOSPADM

## 2023-09-07 RX ORDER — LIDOCAINE 40 MG/G
CREAM TOPICAL
Status: DISCONTINUED | OUTPATIENT
Start: 2023-09-07 | End: 2023-09-10 | Stop reason: HOSPADM

## 2023-09-07 RX ORDER — NALBUPHINE HYDROCHLORIDE 20 MG/ML
2.5-5 INJECTION, SOLUTION INTRAMUSCULAR; INTRAVENOUS; SUBCUTANEOUS EVERY 6 HOURS PRN
Status: DISCONTINUED | OUTPATIENT
Start: 2023-09-07 | End: 2023-09-10 | Stop reason: HOSPADM

## 2023-09-07 RX ORDER — PROCHLORPERAZINE 25 MG
25 SUPPOSITORY, RECTAL RECTAL EVERY 12 HOURS PRN
Status: DISCONTINUED | OUTPATIENT
Start: 2023-09-07 | End: 2023-09-10 | Stop reason: HOSPADM

## 2023-09-07 RX ORDER — ONDANSETRON 2 MG/ML
INJECTION INTRAMUSCULAR; INTRAVENOUS PRN
Status: DISCONTINUED | OUTPATIENT
Start: 2023-09-07 | End: 2023-09-07

## 2023-09-07 RX ORDER — KETOROLAC TROMETHAMINE 30 MG/ML
30 INJECTION, SOLUTION INTRAMUSCULAR; INTRAVENOUS EVERY 6 HOURS
Status: COMPLETED | OUTPATIENT
Start: 2023-09-08 | End: 2023-09-08

## 2023-09-07 RX ORDER — CALCIUM GLUCONATE 94 MG/ML
1 INJECTION, SOLUTION INTRAVENOUS
Status: CANCELLED | OUTPATIENT
Start: 2023-09-07

## 2023-09-07 RX ORDER — NIFEDIPINE 30 MG/1
30 TABLET, EXTENDED RELEASE ORAL DAILY
Status: DISCONTINUED | OUTPATIENT
Start: 2023-09-08 | End: 2023-09-09

## 2023-09-07 RX ORDER — ACETAMINOPHEN 325 MG/1
975 TABLET ORAL EVERY 6 HOURS
Status: DISCONTINUED | OUTPATIENT
Start: 2023-09-07 | End: 2023-09-10 | Stop reason: HOSPADM

## 2023-09-07 RX ORDER — OXYTOCIN 10 [USP'U]/ML
10 INJECTION, SOLUTION INTRAMUSCULAR; INTRAVENOUS
Status: DISCONTINUED | OUTPATIENT
Start: 2023-09-07 | End: 2023-09-10 | Stop reason: HOSPADM

## 2023-09-07 RX ORDER — FENTANYL CITRATE-0.9 % NACL/PF 10 MCG/ML
PLASTIC BAG, INJECTION (ML) INTRAVENOUS
Status: DISCONTINUED
Start: 2023-09-07 | End: 2023-09-07 | Stop reason: HOSPADM

## 2023-09-07 RX ORDER — OXYTOCIN/0.9 % SODIUM CHLORIDE 30/500 ML
340 PLASTIC BAG, INJECTION (ML) INTRAVENOUS CONTINUOUS PRN
Status: DISCONTINUED | OUTPATIENT
Start: 2023-09-07 | End: 2023-09-07 | Stop reason: HOSPADM

## 2023-09-07 RX ORDER — OXYTOCIN 10 [USP'U]/ML
INJECTION, SOLUTION INTRAMUSCULAR; INTRAVENOUS
Status: DISCONTINUED
Start: 2023-09-07 | End: 2023-09-07 | Stop reason: HOSPADM

## 2023-09-07 RX ORDER — MODIFIED LANOLIN
OINTMENT (GRAM) TOPICAL
Status: DISCONTINUED | OUTPATIENT
Start: 2023-09-07 | End: 2023-09-10 | Stop reason: HOSPADM

## 2023-09-07 RX ORDER — METHYLERGONOVINE MALEATE 0.2 MG/ML
200 INJECTION INTRAVENOUS
Status: DISCONTINUED | OUTPATIENT
Start: 2023-09-07 | End: 2023-09-10 | Stop reason: HOSPADM

## 2023-09-07 RX ORDER — MAGNESIUM SULFATE IN WATER 40 MG/ML
2 INJECTION, SOLUTION INTRAVENOUS CONTINUOUS
Status: CANCELLED | OUTPATIENT
Start: 2023-09-07

## 2023-09-07 RX ORDER — MAGNESIUM SULFATE HEPTAHYDRATE 40 MG/ML
4 INJECTION, SOLUTION INTRAVENOUS
Status: CANCELLED | OUTPATIENT
Start: 2023-09-07

## 2023-09-07 RX ORDER — FENTANYL/ROPIVACAINE/NS/PF 2MCG/ML-.1
PLASTIC BAG, INJECTION (ML) EPIDURAL
Status: DISCONTINUED | OUTPATIENT
Start: 2023-09-07 | End: 2023-09-07 | Stop reason: HOSPADM

## 2023-09-07 RX ORDER — MISOPROSTOL 200 UG/1
800 TABLET ORAL
Status: DISCONTINUED | OUTPATIENT
Start: 2023-09-07 | End: 2023-09-10 | Stop reason: HOSPADM

## 2023-09-07 RX ORDER — CARBOPROST TROMETHAMINE 250 UG/ML
250 INJECTION, SOLUTION INTRAMUSCULAR
Status: DISCONTINUED | OUTPATIENT
Start: 2023-09-07 | End: 2023-09-10 | Stop reason: HOSPADM

## 2023-09-07 RX ORDER — AMOXICILLIN 250 MG
1 CAPSULE ORAL 2 TIMES DAILY
Status: DISCONTINUED | OUTPATIENT
Start: 2023-09-07 | End: 2023-09-10 | Stop reason: HOSPADM

## 2023-09-07 RX ORDER — OXYTOCIN/0.9 % SODIUM CHLORIDE 30/500 ML
PLASTIC BAG, INJECTION (ML) INTRAVENOUS
Status: DISCONTINUED
Start: 2023-09-07 | End: 2023-09-07 | Stop reason: HOSPADM

## 2023-09-07 RX ORDER — LABETALOL HYDROCHLORIDE 5 MG/ML
20-80 INJECTION, SOLUTION INTRAVENOUS EVERY 10 MIN PRN
Status: CANCELLED | OUTPATIENT
Start: 2023-09-07

## 2023-09-07 RX ORDER — PROCHLORPERAZINE MALEATE 10 MG
10 TABLET ORAL EVERY 6 HOURS PRN
Status: DISCONTINUED | OUTPATIENT
Start: 2023-09-07 | End: 2023-09-10 | Stop reason: HOSPADM

## 2023-09-07 RX ORDER — MISOPROSTOL 200 UG/1
400 TABLET ORAL
Status: DISCONTINUED | OUTPATIENT
Start: 2023-09-07 | End: 2023-09-07 | Stop reason: HOSPADM

## 2023-09-07 RX ORDER — LIDOCAINE 40 MG/G
CREAM TOPICAL
Status: DISCONTINUED | OUTPATIENT
Start: 2023-09-07 | End: 2023-09-07 | Stop reason: HOSPADM

## 2023-09-07 RX ORDER — SODIUM CHLORIDE, SODIUM LACTATE, POTASSIUM CHLORIDE, CALCIUM CHLORIDE 600; 310; 30; 20 MG/100ML; MG/100ML; MG/100ML; MG/100ML
10-125 INJECTION, SOLUTION INTRAVENOUS CONTINUOUS
Status: CANCELLED | OUTPATIENT
Start: 2023-09-07

## 2023-09-07 RX ORDER — CEFAZOLIN SODIUM/WATER 2 G/20 ML
2 SYRINGE (ML) INTRAVENOUS SEE ADMIN INSTRUCTIONS
Status: DISCONTINUED | OUTPATIENT
Start: 2023-09-07 | End: 2023-09-07 | Stop reason: HOSPADM

## 2023-09-07 RX ORDER — FENTANYL CITRATE 50 UG/ML
50 INJECTION, SOLUTION INTRAMUSCULAR; INTRAVENOUS
Status: DISCONTINUED | OUTPATIENT
Start: 2023-09-07 | End: 2023-09-10 | Stop reason: HOSPADM

## 2023-09-07 RX ORDER — HYDROCORTISONE 25 MG/G
CREAM TOPICAL 3 TIMES DAILY PRN
Status: DISCONTINUED | OUTPATIENT
Start: 2023-09-07 | End: 2023-09-10 | Stop reason: HOSPADM

## 2023-09-07 RX ORDER — MISOPROSTOL 200 UG/1
800 TABLET ORAL
Status: DISCONTINUED | OUTPATIENT
Start: 2023-09-07 | End: 2023-09-07 | Stop reason: HOSPADM

## 2023-09-07 RX ORDER — ACETAMINOPHEN 325 MG/1
975 TABLET ORAL ONCE
Status: COMPLETED | OUTPATIENT
Start: 2023-09-07 | End: 2023-09-07

## 2023-09-07 RX ORDER — CEFAZOLIN SODIUM/WATER 2 G/20 ML
2 SYRINGE (ML) INTRAVENOUS
Status: DISCONTINUED | OUTPATIENT
Start: 2023-09-07 | End: 2023-09-07 | Stop reason: HOSPADM

## 2023-09-07 RX ORDER — SODIUM CHLORIDE, SODIUM LACTATE, POTASSIUM CHLORIDE, CALCIUM CHLORIDE 600; 310; 30; 20 MG/100ML; MG/100ML; MG/100ML; MG/100ML
INJECTION, SOLUTION INTRAVENOUS ONCE
Status: DISCONTINUED | OUTPATIENT
Start: 2023-09-07 | End: 2023-09-10 | Stop reason: HOSPADM

## 2023-09-07 RX ORDER — METOCLOPRAMIDE 10 MG/1
10 TABLET ORAL EVERY 6 HOURS PRN
Status: DISCONTINUED | OUTPATIENT
Start: 2023-09-07 | End: 2023-09-10 | Stop reason: HOSPADM

## 2023-09-07 RX ORDER — METHYLERGONOVINE MALEATE 0.2 MG/ML
200 INJECTION INTRAVENOUS
Status: DISCONTINUED | OUTPATIENT
Start: 2023-09-07 | End: 2023-09-07 | Stop reason: HOSPADM

## 2023-09-07 RX ADMIN — LIDOCAINE HYDROCHLORIDE 10 ML: 20 INJECTION, SOLUTION INTRAVENOUS at 16:01

## 2023-09-07 RX ADMIN — SODIUM CHLORIDE, POTASSIUM CHLORIDE, SODIUM LACTATE AND CALCIUM CHLORIDE: 600; 310; 30; 20 INJECTION, SOLUTION INTRAVENOUS at 03:46

## 2023-09-07 RX ADMIN — SODIUM CHLORIDE, POTASSIUM CHLORIDE, SODIUM LACTATE AND CALCIUM CHLORIDE: 600; 310; 30; 20 INJECTION, SOLUTION INTRAVENOUS at 16:02

## 2023-09-07 RX ADMIN — PHENYLEPHRINE HYDROCHLORIDE 50 MCG/MIN: 10 INJECTION INTRAVENOUS at 16:16

## 2023-09-07 RX ADMIN — ACETAMINOPHEN 975 MG: 325 TABLET, FILM COATED ORAL at 15:55

## 2023-09-07 RX ADMIN — LIDOCAINE HYDROCHLORIDE 5 ML: 20 INJECTION, SOLUTION INTRAVENOUS at 16:18

## 2023-09-07 RX ADMIN — PENICILLIN G 3 MILLION UNITS: 3000000 INJECTION, SOLUTION INTRAVENOUS at 01:15

## 2023-09-07 RX ADMIN — BUPIVACAINE 20 ML: 13.3 INJECTION, SUSPENSION, LIPOSOMAL INFILTRATION at 18:20

## 2023-09-07 RX ADMIN — LABETALOL HYDROCHLORIDE 20 MG: 5 INJECTION, SOLUTION INTRAVENOUS at 07:30

## 2023-09-07 RX ADMIN — MAGNESIUM SULFATE HEPTAHYDRATE 2 G/HR: 40 INJECTION, SOLUTION INTRAVENOUS at 07:45

## 2023-09-07 RX ADMIN — BUPIVACAINE HYDROCHLORIDE 20 ML: 2.5 INJECTION, SOLUTION EPIDURAL; INFILTRATION; INTRACAUDAL at 18:20

## 2023-09-07 RX ADMIN — PHENYLEPHRINE HYDROCHLORIDE 75 MCG/MIN: 10 INJECTION INTRAVENOUS at 16:21

## 2023-09-07 RX ADMIN — SODIUM CHLORIDE, POTASSIUM CHLORIDE, SODIUM LACTATE AND CALCIUM CHLORIDE: 600; 310; 30; 20 INJECTION, SOLUTION INTRAVENOUS at 13:59

## 2023-09-07 RX ADMIN — PENICILLIN G 3 MILLION UNITS: 3000000 INJECTION, SOLUTION INTRAVENOUS at 08:51

## 2023-09-07 RX ADMIN — FENTANYL CITRATE 50 MCG: 50 INJECTION INTRAMUSCULAR; INTRAVENOUS at 01:49

## 2023-09-07 RX ADMIN — PHENYLEPHRINE HYDROCHLORIDE 100 MCG: 10 INJECTION INTRAVENOUS at 16:21

## 2023-09-07 RX ADMIN — ACETAMINOPHEN 975 MG: 325 TABLET, FILM COATED ORAL at 22:37

## 2023-09-07 RX ADMIN — MORPHINE SULFATE 2 MG: 1 INJECTION EPIDURAL; INTRATHECAL; INTRAVENOUS at 16:24

## 2023-09-07 RX ADMIN — ONDANSETRON 4 MG: 2 INJECTION INTRAMUSCULAR; INTRAVENOUS at 16:07

## 2023-09-07 RX ADMIN — PENICILLIN G 3 MILLION UNITS: 3000000 INJECTION, SOLUTION INTRAVENOUS at 13:20

## 2023-09-07 RX ADMIN — Medication 600 ML/HR: at 16:59

## 2023-09-07 RX ADMIN — PENICILLIN G 3 MILLION UNITS: 3000000 INJECTION, SOLUTION INTRAVENOUS at 05:10

## 2023-09-07 RX ADMIN — Medication 3 ML: at 10:30

## 2023-09-07 RX ADMIN — Medication 500 MG: at 16:14

## 2023-09-07 RX ADMIN — SODIUM CITRATE AND CITRIC ACID MONOHYDRATE 30 ML: 500; 334 SOLUTION ORAL at 15:55

## 2023-09-07 RX ADMIN — Medication 2 G: at 16:14

## 2023-09-07 RX ADMIN — KETOROLAC TROMETHAMINE 30 MG: 30 INJECTION, SOLUTION INTRAMUSCULAR at 21:39

## 2023-09-07 RX ADMIN — Medication 18 MILLI-UNITS/MIN: at 08:01

## 2023-09-07 RX ADMIN — FENTANYL CITRATE 50 MCG: 50 INJECTION INTRAMUSCULAR; INTRAVENOUS at 03:36

## 2023-09-07 RX ADMIN — AZITHROMYCIN MONOHYDRATE 500 MG: 500 INJECTION, POWDER, LYOPHILIZED, FOR SOLUTION INTRAVENOUS at 16:15

## 2023-09-07 RX ADMIN — LIDOCAINE HYDROCHLORIDE 5 ML: 20 INJECTION, SOLUTION INTRAVENOUS at 17:21

## 2023-09-07 RX ADMIN — PHENYLEPHRINE HYDROCHLORIDE 150 MCG: 10 INJECTION INTRAVENOUS at 16:30

## 2023-09-07 ASSESSMENT — ACTIVITIES OF DAILY LIVING (ADL)
ADLS_ACUITY_SCORE: 18

## 2023-09-07 NOTE — PROGRESS NOTES
Intrapartum Progress Note    S: Patient reports her contractions continue to be painful.  She tried fentanyl, which she doesn't think helped much.    O: Patient Vitals for the past 4 hrs:   BP Temp Temp src Resp SpO2   23 0610 (!) 142/89 97.8  F (36.6  C) Oral 18 98 %   23 0505 133/85 97.8  F (36.6  C) Oral 18 --   23 0400 133/71 98.3  F (36.8  C) Oral 20 97 %   23 0300 (!) 142/83 98.3  F (36.8  C) Oral 18 95 %     ]  Gen: awake, alert, answering questions appropriately, appears uncomfortable during contractions  CV- regular rate  Lung- breathing comfortably on room air  Ext- bilateral lower extremities with trace edema  Cervix- 5/70/-2/soft/anterior at 0530 per RN    FHT:   Baseline 115 bpm   Variability moderate  Accels Present  Decels none    [place]- 125 MVU     Membranes: AROM at 0000  IUPC and FSE in place    A/P: 33 year old  at 34w2d by 7w1d us who is undergoing induction of labor for pre-e with severe features, currently on pitocin in latent labor.    Induction of labor:  S/p cook catheter. S/p AROM  Currently on pitocin at 12 mu/min.  Continue to titrate per protocol.  Currently contractions inadequate.  Patient is GBS positive, adequate on PCN    Pain- tried fentanyl without much improvement.  Discussed after 2-3 doses of fentanyl I would recommend getting an epidural if she desires continued pain control methods.  Explained that she has a number of hours until delivery and IV fentanyl is not a good long term option and is not an option at all when delivery soon.  Patient will consider this.  Continue to readdress pain control.    FWB:  Severe FGR with EFW of 1590g on 23, 2%tile  Category 1, reactive  IUPC and FSE placed.      Cindy Rincon MD [id number]

## 2023-09-07 NOTE — ANESTHESIA PROCEDURE NOTES
TAP Procedure Note    Pre-Procedure   Staff -        Anesthesiologist:  Sotero Gonzalez MD       Resident/Fellow: Oleg Messina MD       Performed By: resident       Location: pre-op       Pre-Anesthestic Checklist: patient identified, IV checked, site marked, risks and benefits discussed, informed consent, monitors and equipment checked, pre-op evaluation, at physician/surgeon's request and post-op pain management  Timeout:       Correct Patient: Yes        Correct Procedure: Yes        Correct Site: Yes        Correct Position: Yes        Correct Laterality: Yes        Site Marked: Yes  Procedure Documentation  Procedure: TAP       Diagnosis: POST OPERATIVE PAIN       Laterality: bilateral       Patient Position: supine       Patient Prep/Sterile Barriers: sterile gloves, mask       Skin prep: Chloraprep       Needle Type: short bevel       Needle Gauge: 21.        Needle Length (millimeters): 110        Ultrasound guided       1. Ultrasound was used to identify targeted nerve, plexus, vascular marker, or fascial plane and place a needle adjacent to it in real-time.       2. Ultrasound was used to visualize the spread of anesthetic in close proximity to the above referenced structure.       3. A permanent image is entered into the patient's record.    Assessment/Narrative         The placement was negative for: blood aspirated, painful injection and site bleeding       Paresthesias: No.       Bolus given via needle..        Secured via.        Insertion/Infusion Method: Single Shot       Complications: none       Injection made incrementally with aspirations every 5 mL.    Medication(s) Administered   Bupivacaine 0.25% PF (Infiltration) - Infiltration   20 mL - 9/7/2023 6:20:00 PM  Bupivacaine liposome (Exparel) 1.3% LA inj susp (Infiltration) - Infiltration   20 mL - 9/7/2023 6:20:00 PM    FOR [facility] ([facility]) ONLY:   Pain Team Contact information: please page the Pain Team Via [employer]. Search  "\"Pain\". During daytime hours, please page the attending first. At night please page the resident first.      "

## 2023-09-07 NOTE — ANESTHESIA PREPROCEDURE EVALUATION
Anesthesia Pre-Procedure Evaluation    Patient: Malena Thomas   MRN: [id number] : 1990        Procedure :           Past Medical History:   Diagnosis Date    Known health problems: none       Past Surgical History:   Procedure Laterality Date    NO HISTORY OF SURGERY        No Known Allergies   Social History     Tobacco Use    Smoking status: Never    Smokeless tobacco: Never   Substance Use Topics    Alcohol use: Not Currently      Wt Readings from Last 1 Encounters:   23 104.5 kg (230 lb 6.4 oz)        Anesthesia Evaluation        No history of anesthetic complications       ROS/MED HX  ENT/Pulmonary:  - neg pulmonary ROS     Neurologic:  - neg neurologic ROS     Cardiovascular: Comment: In prior pregnancy, progressed to HELLP, currently plt stable    (+)  - - PIH and Mg ++ gtt, BP Meds and Severe  -  - -                                      METS/Exercise Tolerance:     Hematologic:     (+)    no thrombocytopenia,         (-) anemia   Musculoskeletal:       GI/Hepatic:     (+) GERD,                   Renal/Genitourinary:       Endo:     (+)               Obesity,       Psychiatric/Substance Use:       Infectious Disease:       Malignancy:       Other:   H/o IUFD with prior pregnancy 2/2 likely abruption. HELLP with pulmonary edema in that pregnancy. Subsequently 2nd trimester fetal demise  (-) previous        Physical Exam    Airway        Mallampati: III   TM distance: > 3 FB   Neck ROM: full   Mouth opening: > 3 cm    Respiratory Devices and Support         Dental  no notable dental history         Cardiovascular   cardiovascular exam normal          Pulmonary   pulmonary exam normal                OUTSIDE LABS:  CBC:   Lab Results   Component Value Date    WBC 18.8 (H) 2023    WBC 16.7 (H) 2023    HGB 13.6 2023    HGB 13.3 2023    HCT 39.4 2023    HCT 37.9 2023     2023     2023     BMP:   Lab Results   Component Value Date      11/16/2022     11/16/2022    POTASSIUM 3.8 11/16/2022    POTASSIUM 3.8 11/16/2022    CHLORIDE 106 11/16/2022    CHLORIDE 110 (H) 04/26/2022    CO2 23 11/16/2022    CO2 22 04/26/2022    BUN 9 11/16/2022    BUN 19 04/26/2022    CR 0.63 09/07/2023    CR 0.68 09/06/2023     (H) 11/16/2022     (H) 11/16/2022     COAGS:   Lab Results   Component Value Date    PTT 27 11/16/2022    INR 1.07 11/16/2022    FIBR 429 11/16/2022     POC:   Lab Results   Component Value Date    HCG Positive (A) 11/16/2022     HEPATIC:   Lab Results   Component Value Date    ALBUMIN 3.0 (L) 11/16/2022    PROTTOTAL 8.0 11/16/2022    ALT 12 09/05/2023    AST 20 09/07/2023    ALKPHOS 75 11/16/2022    BILITOTAL 0.4 11/16/2022     OTHER:   Lab Results   Component Value Date    LACT 1.4 04/22/2022    A1C 5.0 11/16/2022    JASON 8.7 11/16/2022    MAG 3.8 (H) 04/22/2022    TSH 1.57 11/16/2022    CRP 12.0 (H) 04/22/2022       Anesthesia Plan    ASA Status:  3, emergent       Anesthesia Type: Epidural.              Consents    Anesthesia Plan(s) and associated risks, benefits, and realistic alternatives discussed. Questions answered and patient/representative(s) expressed understanding.     - Discussed:     - Discussed with:  Patient            Postoperative Care            Comments:    Other Comments: Discussed risks of anesthesia including nausea, vomiting, headache, itching, bleeding, infection, cardiopulmonary complications, neurologic complications, and serious complications.    Plan: this PM for Emergent C/S                Taryn Vines MD

## 2023-09-07 NOTE — PLAN OF CARE
One non-sustained severe range BP overnight. All other VSS stable. Pt denies headache, SOB, changes in vision, N/V, chest pain, and vaginal bleeding overnight. AROM at 0006. Leaking clear fluid. Last SVE at 0515- 5/70%/-2. Fentanyl given for pain management overnight. See MAR for details. Internals placed due to difficulty tracing contraction and FHR. EFM as charted. Epidural education provided to pt. Pt declines epidural at this time. Further questions encouraged. Pt resting comfortably between contractions. Spouse supportive at bedside. No further questions or concerns noted from pt at this time.

## 2023-09-07 NOTE — OP NOTE
DATE OF PROCEDURE:      2023      PREOPERATIVE DIAGNOSIS(ES):    Intrauterine pregnancy at 34w2d   Pre-eclampsia with severe features   Failed induction of labor   Severe fetal growth restriction with elevated UA dopplers     POSTOPERATIVE DIAGNOSIS(ES):    Intrauterine pregnancy at 34w2d, delivered   Pre-eclampsia with severe features   Failed induction of labor   Severe fetal growth restriction with elevated UA dopplers      PROCEDURE PERFORMED:     Primary low transverse  section.    SURGEON:    Karma Fields MD.    ASSISTANT(S):    Guilherme Perez MD, PGY-2     ANESTHESIA:  Epidural    COMPLICATIONS:  None.    QUANTITATIVE BLOOD LOSS:  1232 mL      FINDINGS:    1.Viable female infant in cephalic presentation weighing 1.66 kg with Apgars 7 and 9.  2.Normal uterus, tubes, and ovaries bilaterally.      INDICATIONS:    Malena Thomas is a 32 YO  who was admitted to antepartum at 33w2d with pre-ecclampsia with severe features, her pregnancy was also complicated by severe fetal growth restriction and elevated UA dopplers. She received an initial course of magnesium as well as betamethasone and was monitored on the antepartum unit. Her induction was started at 34w0d and she was induced with a cervical ripening balloon, pitocin and AROM. She eventually made change to 5 cm but no further significant change after 16 hours following rupture despite adequate contractions on pitocin so the decision was made to proceed with a  section.        DESCRIPTION OF PROCEDURE:    The patient was taken to the operating room, where her epidural anesthesia was redosed. The patient was then prepped and draped in a normal sterile fashion in the dorsal supine position with a leftward tilt.  A timeout was performed.  She had received appropriate antibiotic prophylaxis.    A Pfannenstiel skin incision was made with a scalpel and carried through to the underlying fascia.  The fascia was incised in the midline and  the incision extended laterally with Cochran scissors.  The superior aspect of the fascial incision was grasped with Kocher clamps, elevated and underlying rectus muscles dissected off.    Attention then was turned to the inferior aspect of the incision, which in a similar fashion was grasped with Kocher clamps, elevated and underlying rectus muscles dissected off.  The rectus muscles were  in the midline, the peritoneum identified and entered bluntly and stretched bilaterally.  The bladder blade was inserted and the uterus was incised in a transverse fashion in the lower uterine segment and was entered bluntly.  The uterine incision was then stretched superiorly and inferiorly, and the infant's head was delivered atraumatically.The cord was clamped and cut, and the infant was handed off to the waiting NICU team.    The placenta was then expressed, the uterus cleared of all clot and debris.  The uterine incision was delineated with ring forceps.  The uterus was then repaired in a running locked fashion with 0 Vicryl.  A second, nonlocking, layer of the same suture was used to attain hemostasis. SurgiFlo was also applied along the right corner of the hysterotomy.  The adnexa were inspected and found to be normal bilaterally.  The subfascial tissues were inspected systematically.  Hemostasis was achieved with the Bovie.      The fascia was then reapproximated with 0 Vicryl in a running fashion.  The wound was irrigated.  Hemostasis was noted.   The subcutaneous layer was closed with 3-0 plain gut suture and the skin was closed with 4-0 Monocryl.  Steri-strips and a sterile dressing were applied.  The uterus was expressed.  The patient tolerated the procedure well.  All counts were correct.  The patient was taken to recovery room in stable condition.       The attending surgeon was scrubbed and present for the entire procedure.    Karma Fields MD

## 2023-09-07 NOTE — PLAN OF CARE
Goal Outcome Evaluation:       Patient VSS and afebrile. Patient denies any worsening S/S pre-e, new pain or change in condition. SVE per provider 6cm, with minimal change. , minimal variability, no decels, provider aware, ctx q 3-4 mins apart, see flowsheet for more details. Provider recommends c/s at this time due to FHR minimal variability and minimal cervical change over 10 hours. Patient questions asked and answered. Patient agreeable to c/s. Will proceed with c/s preparation and ongoing assessment.

## 2023-09-07 NOTE — PROVIDER NOTIFICATION
09/07/23 0920   Provider Notification   Provider Name/Title MD Diamond   Method of Notification At Bedside   Request Evaluate in Person   Notification Reason SVE;Status Update     Provider at bedside to evaluate in person. SVE 5/80/-2, patient reports increades pain with contractions, requesting an epidural. Plan per provider to contact Anesthesia team for epidural administration. To re-evaluate SVE in the late afternoon or PRN. Will proceed with ongoing assessment.

## 2023-09-07 NOTE — ANESTHESIA CARE TRANSFER NOTE
Patient: Malena Thomas    Procedure: Procedure(s):   section       Diagnosis:  delivery delivered [O82]  Diagnosis Additional Information: No value filed.    Anesthesia Type:   Epidural     Note:    Oropharynx: oropharynx clear of all foreign objects and spontaneously breathing  Level of Consciousness: awake  Oxygen Supplementation: room air    Independent Airway: airway patency satisfactory and stable  Dentition: dentition unchanged  Vital Signs Stable: post-procedure vital signs reviewed and stable  Report to RN Given: handoff report given  Patient transferred to: PACU    Handoff Report: Identifed the Patient, Identified the Reponsible Provider, Reviewed the pertinent medical history, Discussed the surgical course, Reviewed Intra-OP anesthesia mangement and issues during anesthesia, Set expectations for post-procedure period and Allowed opportunity for questions and acknowledgement of understanding      Vitals:  Vitals Value Taken Time   /81 23 1826   Temp     Pulse 71 23 1829   Resp     SpO2 99 % 23 1829   Vitals shown include unvalidated device data.    Electronically Signed By: Oleg Messina MD  2023  6:30 PM

## 2023-09-07 NOTE — PROVIDER NOTIFICATION
09/07/23 1300   Provider Notification   Provider Name/Title MD Tara   Method of Notification Electronic Page   Request Evaluate - Remote   Notification Reason Uterine Activity     Provider notified of uterine activity q 3-7 mins, approx 210MVU aat 24mu/min pitocin. Will proceed with ongoing assessment.

## 2023-09-07 NOTE — ANESTHESIA PROCEDURE NOTES
Epidural catheter Procedure Note    Pre-Procedure   Staff -        Anesthesiologist:  Taryn Vines MD       Resident/Fellow: Johnson Esquivel MD       Performed By: resident and anesthesiologist       Location: OB       Procedure Start/Stop Times: 9/7/2023 10:05 AM       Pre-Anesthestic Checklist: patient identified, IV checked, risks and benefits discussed, informed consent, monitors and equipment checked, pre-op evaluation, at physician/surgeon's request and post-op pain management  Timeout:       Correct Patient: Yes        Correct Procedure: Yes        Correct Site: Yes        Correct Position: Yes   Procedure Documentation  Procedure: epidural catheter       Diagnosis: labor pain       Patient Position: sitting       Patient Prep/Sterile Barriers: sterile gloves, mask, patient draped       Skin prep: Chloraprep       Local skin infiltrated with mL of 1% lidocaine.        Insertion Site: L3-4, L4-5. (midline approach).       Technique: LORT saline        HELIO at 8.5 cm.       Needle Type: IV Cathether       Needle Gauge: 17.        Needle Length (Inches): 3.5        Catheter: 18 G.          Catheter threaded easily.         4.5 cm epidural space.         Threaded 14 cm at skin.         # of attempts: 3 and  # of redirects:  2    Assessment/Narrative         Paresthesias: Yes and Resolved.       Test dose of 3 mL lidocaine 1.5% w/ 1:200,000 epinephrine at 10:26 CDT.         Test dose negative, 3 minutes after injection, for signs of intravascular, subdural, or intrathecal injection.       Insertion/Infusion Method: LORT saline       Aspiration negative for Heme or CSF via Epidural Catheter.    Medication(s) Administered   0.125% bupivacaine (Epidural) - EPIDURAL   3 mL - 9/7/2023 10:30:00 AM  0.1% ropivacaine + 2 mcg/mL fentaNYL in NS - EPIDURAL   3 mL - 9/7/2023 10:30:00 AM  Medication Administration Time: 9/7/2023 10:05 AM     Comments:  Second attempt achieved loss, but catheter aspirating blood, removed as  "high suspicion for intravascular. Placement 1 space lower.      FOR [facility] ([facility]) ONLY:   Pain Team Contact information: please page the Pain Team Via [employer]. Search \"Pain\". During daytime hours, please page the attending first. At night please page the resident first.      "

## 2023-09-07 NOTE — PROGRESS NOTES
Intrapartum Progress Note    S: Patient reports she is feeling painful contractions      O: Patient Vitals for the past 4 hrs:   BP Temp Temp src Pulse Resp SpO2   23 0035 -- 98.3  F (36.8  C) Oral -- 18 --   23 2330 132/81 -- -- -- 18 91 %   23 2300 -- 97.9  F (36.6  C) Oral -- 18 100 %   23 2235 139/89 -- -- 88 18 100 %     ]  Gen: awake, alert, answering questions appropriately, appears uncomfortable during contractions  CV- regular rate  Lung- breathing comfortably on room air  Ext- bilateral lower extremities with trace edema  Cervix- 5/70/-2/soft/anterior at 0100    FHT:   Baseline 120 bpm   Variability moderate  Accels Present  Decels present.  Few small decelerations. Unable to describe if early or late due to inability to  contractions     [place]- contractions not picking up    Membranes: AROM at 0000  IUPC and FSE placed    A/P: 33 year old  at 34w2d by 7w1d us who is undergoing induction of labor for pre-e with severe features, currently on pitocin in latent labor    Induction of labor:  S/p cook catheter.   Currently on pitocin at 24 mu/min.  Continue to titrate per protocol.  Patient is GBS positive, adequate on PCN  S/p AROM  Pain- patient aware of options.  States she will ask for pain medications if she desires them when the time comes.      FWB:  Severe FGR with EFW of 1590g on 23, 2%tile  FHT If decelerations are late this would be category 2 FHT.  Unable to assess timing of decelerations due to inability to trace contractions.    IUPC and FSE placed.    Possible pitocin may need to be titrated down.    Cindy Rincon MD [id number]

## 2023-09-07 NOTE — PROVIDER NOTIFICATION
09/07/23 1050   Provider Notification   Provider Name/Title MD Diamond   Method of Notification At Bedside   Request Evaluate in Person   Notification Reason SVE;Status Update     Provider at bedside to replace IUPC. Provider noted SVE 5/80/-2. Provider discussed risks and benefits of c/s following greater than 12 hours AROM. Patient questions asked and answered. Will proceed with ongoing assessment.

## 2023-09-07 NOTE — PROGRESS NOTES
Intrapartum Progress Note    S: Patient reports her contraction pain is getting much worse.  Denies any headache, changes in vision, chest pain, chest pressure, shortness of breath.    O: Patient Vitals for the past 4 hrs:   BP Temp Temp src Resp SpO2   23 -- -- -- -- 99 %   23 -- -- -- -- 100 %   23 -- -- -- -- 100 %   23 -- -- -- -- 100 %   23 184 -- -- -- -- 100 %   23 183 -- -- -- -- 100 %   23 183 -- -- -- -- 100 %   23 -- -- -- -- 100 %   23 -- -- -- -- 99 %   23 -- -- -- -- 99 %   23 -- -- -- -- 99 %   23 181 (!) 150/93 98.4  F (36.9  C) Oral 16 --   23 -- -- -- -- 100 %   23 180 -- -- -- -- 100 %   23 1749 -- -- -- -- 100 %   23 174 -- -- -- -- 100 %   23 173 -- -- -- -- 100 %   23 173 -- -- -- -- 100 %   23 172 -- -- -- -- 100 %   23 172 -- -- -- -- 100 %   23 171 -- -- -- -- 99 %   23 171 (!) 142/83 98.3  F (36.8  C) Oral 16 98 %   23 1709 -- -- -- -- 98 %   23 1619 -- -- -- -- 100 %   23 1617 134/75 -- -- 16 --   23 1614 -- -- -- -- 100 %   23 1543 -- -- -- -- 100 %   23 1538 -- -- -- -- 100 %   23 1533 -- -- -- -- 99 %   23 1528 -- -- -- -- 100 %   23 1523 -- -- -- -- 100 %   ]  Gen: awake, alert, answering questions appropriately, appears uncomfortable during contractions  CV- regular rate  Lung- breathing comfortably on room air  Ext- bilateral lower extremities with trace edema  Cervix- 4/60/-3/soft/anterior at 1950    FHT:   Baseline 120 bpm   Variability moderate  Accels Present  Decels Absent     [place]:3-4 contractions / 10 minutes    Membranes: intact    A/P: 33 year old  at 34w1d by 7w1d us who is undergoing induction of labor for pre-e with severe features, currently on pitocin.    Induction of labor:  S/p cook catheter.   Currently on pitocin at 24  mu/min.  Continue to titrate per protocol.  Patient is GBS positive, adequate on PCN  Currently making cervical change and having painful contractions on pitocin.  Discussed plan for repeat cervical exam in 2-4 hours with assessment again for AROM.  Pain- patient aware of options.  States she will ask for pain medications if she desires them when the time comes.      Pre-e with severe features:  Mild range BP over past 4 hours.  Last IV labetalol 20mg today at 1045 for sustained severe range BP  Mag 4g load at 1901 on 9/5> Mag at 2g/hr  UOP 150ml/hr  Continue magnesium until 24 hours postpartum.  Immediate acting antihypertensives PRN for sustained severe range BPs.    FWB:  Severe FGR with EFW of 1590g on 8/29/23, 2%tile  FHT Category 1, reactive and reassuring.    Cindy Rincon MD 1950

## 2023-09-07 NOTE — PROGRESS NOTES
[facility] Labor and Delivery Progress Note    Malena Thomas MRN# [id number]   Age: 33 year old YOB: 1990           Subjective:   Patient comfortable with epidural, no headache, nausea or CP/SOB            Objective:   Patient Vitals for the past 24 hrs:   BP Temp Temp src Pulse Resp SpO2 Weight Oximeter Heart Rate   09/07/23 1500 125/79 -- -- -- 18 98 % -- 81 bpm   09/07/23 1430 129/76 -- -- -- 17 96 % -- 79 bpm   09/07/23 1340 -- -- -- -- -- 97 % -- --   09/07/23 1330 118/68 97.7  F (36.5  C) Oral -- -- 97 % -- 83 bpm   09/07/23 1320 -- -- -- -- -- 97 % -- --   09/07/23 1310 125/68 -- -- -- -- 96 % -- 85 bpm   09/07/23 1300 -- -- -- -- -- 97 % -- --   09/07/23 1230 114/62 97.7  F (36.5  C) Oral -- 16 97 % -- 81 bpm   09/07/23 1210 -- -- -- -- -- 97 % -- --   09/07/23 1200 131/75 -- -- -- 16 -- -- 82 bpm   09/07/23 1150 138/77 -- -- -- 17 97 % -- 77 bpm   09/07/23 1140 131/76 -- -- -- 16 98 % -- 73 bpm   09/07/23 1130 (!) 144/91 97.6  F (36.4  C) Oral -- 17 -- -- 78 bpm   09/07/23 1120 136/79 -- -- -- 17 98 % -- 79 bpm   09/07/23 1110 134/80 -- -- -- 16 98 % -- 85 bpm   09/07/23 1100 128/75 -- -- -- 16 98 % -- 80 bpm   09/07/23 1050 127/76 -- -- -- 17 99 % -- 84 bpm   09/07/23 1040 127/81 -- -- -- 16 98 % -- 77 bpm   09/07/23 1030 (!) 143/86 97.6  F (36.4  C) Oral -- 16 97 % -- 78 bpm   09/07/23 1020 (!) 150/93 -- -- -- 17 96 % -- 85 bpm   09/07/23 1014 (!) 149/94 -- -- -- 18 96 % -- 84 bpm   09/07/23 1010 (!) 152/97 -- -- -- 18 96 % -- 86 bpm   09/07/23 1000 -- -- -- -- -- 97 % -- --   09/07/23 0940 135/75 -- -- -- 18 97 % -- 83 bpm   09/07/23 0930 135/75 98.2  F (36.8  C) Oral -- 17 -- -- 79 bpm   09/07/23 0900 (!) 153/78 -- -- -- 18 98 % 104.5 kg (230 lb 6.4 oz) 82 bpm   09/07/23 0850 (!) 151/82 -- -- -- 18 95 % -- 82 bpm   09/07/23 0840 (!) 151/79 -- -- -- 17 97 % -- 83 bpm   09/07/23 0830 (!) 156/80 98.6  F (37  C) -- -- 17 98 % -- 91 bpm   09/07/23 0820 (!) 150/83 -- -- -- 18 94 % -- 82  bpm   09/07/23 0810 (!) 155/85 -- -- -- 17 95 % -- 81 bpm   09/07/23 0801 -- -- -- -- -- 98 % -- --   09/07/23 0800 (!) 153/77 -- -- -- 16 98 % -- 83 bpm   09/07/23 0745 (!) 149/77 -- -- -- 17 96 % -- 81 bpm   09/07/23 0730 -- 98.3  F (36.8  C) Oral -- 18 -- -- --   09/07/23 0727 (!) 167/102 -- -- -- -- -- -- 86 bpm   09/07/23 0700 (!) 160/85 97.8  F (36.6  C) Oral -- -- 98 % -- 92 bpm   09/07/23 0610 (!) 142/89 97.8  F (36.6  C) Oral -- 18 98 % -- 86 bpm   09/07/23 0505 133/85 97.8  F (36.6  C) Oral -- 18 -- -- 88 bpm   09/07/23 0400 133/71 98.3  F (36.8  C) Oral -- 20 97 % -- 81 bpm   09/07/23 0300 (!) 142/83 98.3  F (36.8  C) Oral -- 18 95 % -- 79 bpm   09/07/23 0200 132/86 -- -- -- 18 98 % -- 82 bpm   09/07/23 0140 (!) 165/90 97.8  F (36.6  C) Oral -- -- 100 % -- 83 bpm   09/07/23 0125 -- -- -- -- -- 98 % -- 81 bpm   09/07/23 0120 -- -- -- -- -- 96 % -- 83 bpm   09/07/23 0115 -- -- -- -- -- 100 % -- 91 bpm   09/07/23 0100 -- -- -- -- -- 99 % -- 84 bpm   09/07/23 0035 -- 98.3  F (36.8  C) Oral -- 18 -- -- --   09/06/23 2330 132/81 -- -- -- 18 91 % -- 83 bpm   09/06/23 2300 -- 97.9  F (36.6  C) Oral -- 18 100 % -- --   09/06/23 2235 139/89 -- -- 88 18 100 % -- 88 bpm   09/06/23 1925 (!) 150/82 -- -- -- -- 100 % -- 83 bpm   09/06/23 1920 -- 97.5  F (36.4  C) Oral -- 18 99 % -- 86 bpm   09/06/23 1902 -- -- -- -- -- 99 % -- 85 bpm   09/06/23 1857 -- -- -- -- -- 100 % -- 93 bpm   09/06/23 1852 -- -- -- -- -- 100 % -- 88 bpm   09/06/23 1847 -- -- -- -- -- 100 % -- 87 bpm   09/06/23 1842 -- -- -- -- -- 100 % -- 87 bpm   09/06/23 1837 -- -- -- -- -- 100 % -- 85 bpm   09/06/23 1832 -- -- -- -- -- 100 % -- 87 bpm   09/06/23 1827 -- -- -- -- -- 100 % -- 89 bpm   09/06/23 1822 -- -- -- -- -- 99 % -- 87 bpm   09/06/23 1817 -- -- -- -- -- 99 % -- 90 bpm   09/06/23 1812 -- -- -- -- -- 99 % -- 89 bpm   09/06/23 1811 (!) 150/93 98.4  F (36.9  C) Oral -- 16 -- -- 86 bpm   09/06/23 1807 -- -- -- -- -- 100 % -- 85 bpm   09/06/23  1802 -- -- -- -- -- 100 % -- 94 bpm   23 1749 -- -- -- -- -- 100 % -- 87 bpm   23 174 -- -- -- -- -- 100 % -- 84 bpm   23 173 -- -- -- -- -- 100 % -- 82 bpm   23 173 -- -- -- -- -- 100 % -- 82 bpm   23 172 -- -- -- -- -- 100 % -- 82 bpm   23 172 -- -- -- -- -- 100 % -- 82 bpm   23 171 -- -- -- -- -- 99 % -- 79 bpm   23 171 (!) 142/83 98.3  F (36.8  C) Oral -- 16 98 % -- 84 bpm   23 170 -- -- -- -- -- 98 % -- 78 bpm   23 1619 -- -- -- -- -- 100 % -- 77 bpm   23 1617 134/75 -- -- -- 16 -- -- 81 bpm   23 1614 -- -- -- -- -- 100 % -- 77 bpm   23 1543 -- -- -- -- -- 100 % -- 82 bpm   23 1538 -- -- -- -- -- 100 % -- 89 bpm   23 1533 -- -- -- -- -- 99 % -- 77 bpm   23 1528 -- -- -- -- -- 100 % -- 79 bpm   23 1523 -- -- -- -- -- 100 % -- 75 bpm   23 1520 -- 97.7  F (36.5  C) Oral -- 16 100 % -- 79 bpm         Cervical Exam: 5-6 / 80% / -2      Position: Mid    Membranes: Leaking     Fetal Heart Rate:    Monitor: fetal spiral electrode    Variability: minimal (detectable, amplitude less than or equal to 5 bpm)    Baseline Rate: bradycardia    Fetal Heart Rate Tracin/minimal/+accels, rare late decel           Assessment:   Malena Thomas is a 33 year old  who is 34w2d here for IOL due to pre-e with SF           Plan:   -Discussed minimal cervical change in >12 hours of DIVP after AROM and that I am also now concerned about her fetal heart rate tracing due to decreasing baseline, periods of minimal variability and rare decel. We discussed that the periods of minimal variability could be related to magnesium effect on baby but that it is difficult to rule out placental insufficiency especially in the setting of severe FGR and I would recommend proceeding with a CS.   Risks and benefits of surgery discussed with patient who wishes to proceed. Pt counseled on risks of bleeding, infection, damage to  surrounding structures (bowel, bladder, ureters, nerves, blood vessels, infant) necessitating further surgery. Pt further counseled regarding surgical and medical management of uterine hemorrhage. Pt also understands that in a life threatening situation an emergency hysterectomy may be performed. She does accept blood products. Questions answered to the patient and her family's apparent satisfaction. Consent form signed for  section.     Charge RN, anesthesia and scrub tech notified of plan to proceed with urgent , goal for decision to incision within 30 min. DIVP turned off.     Karma Fields MD

## 2023-09-07 NOTE — PROVIDER NOTIFICATION
09/07/23 0730   Provider Notification   Provider Name/Title Dr. Fields   Method of Notification Phone   Request Evaluate - Remote   Notification Reason Maternal Vital Sign Change     Provider notified of sustained severe range BP x2, at bedside administering 20mg labetalol per orders. To notify provider if Pt BP does not respond to initial labetalol dose and proceed with algorithm. Will proceed with ongoing assessment.

## 2023-09-07 NOTE — PROGRESS NOTES
[facility] Labor and Delivery Progress Note    Malena Thomas MRN# [id number]   Age: 33 year old YOB: 1990           Subjective:   Patient reporting increased pain with contractions, requesting epidural at this time otherwise no concerns.            Objective:   Patient Vitals for the past 24 hrs:   BP Temp Temp src Pulse Resp SpO2 Weight Oximeter Heart Rate   09/07/23 0900 (!) 153/78 -- -- -- 18 98 % 104.5 kg (230 lb 6.4 oz) 82 bpm   09/07/23 0850 (!) 151/82 -- -- -- 18 95 % -- 82 bpm   09/07/23 0840 (!) 151/79 -- -- -- 17 97 % -- 83 bpm   09/07/23 0830 (!) 156/80 -- -- -- 17 98 % -- 91 bpm   09/07/23 0820 (!) 150/83 -- -- -- 18 94 % -- 82 bpm   09/07/23 0810 (!) 155/85 -- -- -- 17 95 % -- 81 bpm   09/07/23 0801 -- -- -- -- -- 98 % -- --   09/07/23 0800 (!) 153/77 -- -- -- 16 98 % -- 83 bpm   09/07/23 0745 (!) 149/77 -- -- -- 17 96 % -- 81 bpm   09/07/23 0730 -- 98.3  F (36.8  C) Oral -- 18 -- -- --   09/07/23 0727 (!) 167/102 -- -- -- -- -- -- 86 bpm   09/07/23 0700 (!) 160/85 97.8  F (36.6  C) Oral -- -- 98 % -- 92 bpm   09/07/23 0610 (!) 142/89 97.8  F (36.6  C) Oral -- 18 98 % -- 86 bpm   09/07/23 0505 133/85 97.8  F (36.6  C) Oral -- 18 -- -- 88 bpm   09/07/23 0400 133/71 98.3  F (36.8  C) Oral -- 20 97 % -- 81 bpm   09/07/23 0300 (!) 142/83 98.3  F (36.8  C) Oral -- 18 95 % -- 79 bpm   09/07/23 0200 132/86 -- -- -- 18 98 % -- 82 bpm   09/07/23 0140 (!) 165/90 97.8  F (36.6  C) Oral -- -- 100 % -- 83 bpm   09/07/23 0125 -- -- -- -- -- 98 % -- 81 bpm   09/07/23 0120 -- -- -- -- -- 96 % -- 83 bpm   09/07/23 0115 -- -- -- -- -- 100 % -- 91 bpm   09/07/23 0100 -- -- -- -- -- 99 % -- 84 bpm   09/07/23 0035 -- 98.3  F (36.8  C) Oral -- 18 -- -- --   09/06/23 2330 132/81 -- -- -- 18 91 % -- 83 bpm   09/06/23 2300 -- 97.9  F (36.6  C) Oral -- 18 100 % -- --   09/06/23 2235 139/89 -- -- 88 18 100 % -- 88 bpm   09/06/23 1925 (!) 150/82 -- -- -- -- 100 % -- 83 bpm   09/06/23 1920 -- 97.5  F (36.4  C)  Oral -- 18 99 % -- 86 bpm   09/06/23 1902 -- -- -- -- -- 99 % -- 85 bpm   09/06/23 1857 -- -- -- -- -- 100 % -- 93 bpm   09/06/23 1852 -- -- -- -- -- 100 % -- 88 bpm   09/06/23 1847 -- -- -- -- -- 100 % -- 87 bpm   09/06/23 1842 -- -- -- -- -- 100 % -- 87 bpm   09/06/23 1837 -- -- -- -- -- 100 % -- 85 bpm   09/06/23 1832 -- -- -- -- -- 100 % -- 87 bpm   09/06/23 1827 -- -- -- -- -- 100 % -- 89 bpm   09/06/23 1822 -- -- -- -- -- 99 % -- 87 bpm   09/06/23 1817 -- -- -- -- -- 99 % -- 90 bpm   09/06/23 1812 -- -- -- -- -- 99 % -- 89 bpm   09/06/23 1811 (!) 150/93 98.4  F (36.9  C) Oral -- 16 -- -- 86 bpm   09/06/23 1807 -- -- -- -- -- 100 % -- 85 bpm   09/06/23 1802 -- -- -- -- -- 100 % -- 94 bpm   09/06/23 1749 -- -- -- -- -- 100 % -- 87 bpm   09/06/23 1744 -- -- -- -- -- 100 % -- 84 bpm   09/06/23 1739 -- -- -- -- -- 100 % -- 82 bpm   09/06/23 1734 -- -- -- -- -- 100 % -- 82 bpm   09/06/23 1729 -- -- -- -- -- 100 % -- 82 bpm   09/06/23 1724 -- -- -- -- -- 100 % -- 82 bpm   09/06/23 1719 -- -- -- -- -- 99 % -- 79 bpm   09/06/23 1714 (!) 142/83 98.3  F (36.8  C) Oral -- 16 98 % -- 84 bpm   09/06/23 1709 -- -- -- -- -- 98 % -- 78 bpm   09/06/23 1619 -- -- -- -- -- 100 % -- 77 bpm   09/06/23 1617 134/75 -- -- -- 16 -- -- 81 bpm   09/06/23 1614 -- -- -- -- -- 100 % -- 77 bpm   09/06/23 1543 -- -- -- -- -- 100 % -- 82 bpm   09/06/23 1538 -- -- -- -- -- 100 % -- 89 bpm   09/06/23 1533 -- -- -- -- -- 99 % -- 77 bpm   09/06/23 1528 -- -- -- -- -- 100 % -- 79 bpm   09/06/23 1523 -- -- -- -- -- 100 % -- 75 bpm   09/06/23 1520 -- 97.7  F (36.5  C) Oral -- 16 100 % -- 79 bpm   09/06/23 1513 -- -- -- -- -- 100 % -- 78 bpm   09/06/23 1508 -- -- -- -- -- 99 % -- 77 bpm   09/06/23 1503 -- -- -- -- -- 99 % -- 78 bpm   09/06/23 1458 -- -- -- -- -- 100 % -- 77 bpm   09/06/23 1453 -- -- -- -- -- 99 % -- 83 bpm   09/06/23 1448 (!) 141/95 -- -- -- -- 100 % -- 84 bpm   09/06/23 1443 -- -- -- -- -- 98 % -- 81 bpm   09/06/23 1438 -- -- -- --  -- 99 % -- 87 bpm   09/06/23 1433 -- -- -- -- -- 99 % -- 85 bpm   09/06/23 1428 -- -- -- -- -- 97 % -- 85 bpm   09/06/23 1423 -- -- -- -- -- 97 % -- 84 bpm   09/06/23 1418 -- -- -- -- -- 98 % -- 83 bpm   09/06/23 1417 (!) 145/87 -- -- -- 16 -- -- 88 bpm   09/06/23 1413 -- -- -- -- -- 98 % -- 81 bpm   09/06/23 1408 -- -- -- -- -- 97 % -- 83 bpm   09/06/23 1403 -- -- -- -- -- 98 % -- 81 bpm   09/06/23 1402 (!) 145/92 -- -- -- -- -- -- 83 bpm   09/06/23 1348 -- -- -- -- -- 99 % -- 85 bpm   09/06/23 1346 (!) 144/90 98.4  F (36.9  C) Oral -- 16 -- -- 85 bpm   09/06/23 1343 -- -- -- -- -- 100 % -- 82 bpm   09/06/23 1338 -- -- -- -- -- 100 % -- 82 bpm   09/06/23 1337 (!) 150/94 -- -- -- -- -- -- 83 bpm   09/06/23 1310 -- -- -- -- -- 100 % -- 86 bpm   09/06/23 1305 -- -- -- -- -- 100 % -- 83 bpm   09/06/23 1302 (!) 142/79 -- -- -- -- -- -- 81 bpm   09/06/23 1300 -- -- -- -- -- 100 % -- 83 bpm   09/06/23 1239 -- -- -- -- 16 -- -- --   09/06/23 1235 -- -- -- -- -- 98 % -- 81 bpm   09/06/23 1230 -- -- -- -- -- 99 % -- 85 bpm   09/06/23 1217 (!) 153/92 -- -- -- -- -- -- 86 bpm   09/06/23 1215 -- -- -- -- -- 98 % -- 87 bpm   09/06/23 1210 -- -- -- -- -- 99 % -- 86 bpm   09/06/23 1205 -- -- -- -- -- 99 % -- 88 bpm   09/06/23 1203 (!) 160/97 -- -- -- -- -- -- 87 bpm   09/06/23 1200 -- -- -- -- -- 98 % -- 85 bpm   09/06/23 1150 -- -- -- -- -- 98 % -- 84 bpm   09/06/23 1145 -- -- -- -- -- 97 % -- 86 bpm   09/06/23 1143 (!) 148/93 -- -- -- -- -- -- 85 bpm   09/06/23 1140 -- -- -- -- -- 98 % -- 86 bpm   09/06/23 1135 -- -- -- -- -- 100 % -- 86 bpm   09/06/23 1131 (!) 150/92 -- -- -- -- -- -- 85 bpm   09/06/23 1130 -- -- -- -- -- 97 % -- 85 bpm   09/06/23 1125 -- -- -- -- -- 98 % -- 81 bpm   09/06/23 1121 (!) 155/90 -- -- -- 16 -- -- 83 bpm   09/06/23 1120 -- -- -- -- -- 99 % -- 82 bpm   09/06/23 1115 -- -- -- -- -- 99 % -- 82 bpm   09/06/23 1111 (!) 152/94 -- -- -- -- -- -- 81 bpm   09/06/23 1105 -- -- -- -- -- 99 % -- 86 bpm    23 1101 (!) 139/90 -- -- -- -- -- -- 86 bpm   23 1100 -- 97.4  F (36.3  C) Oral -- -- -- -- --   23 1055 -- -- -- -- -- 99 % -- 83 bpm   23 1050 (!) 145/88 -- -- -- -- 99 % -- 85 bpm   23 1048 -- -- -- -- -- 94 % -- 86 bpm   23 1045 -- -- -- -- -- 100 % -- 90 bpm   23 1042 (!) 164/100 -- -- -- -- -- -- 90 bpm   23 1035 -- -- -- -- -- 100 % -- 86 bpm   23 1030 -- -- -- -- -- 100 % -- 90 bpm   23 1026 (!) 172/104 -- -- -- 18 -- -- 93 bpm   23 0947 -- -- -- -- -- 96 % -- 81 bpm   23 0942 -- -- -- -- -- 97 % -- 86 bpm   23 0937 -- -- -- -- -- 98 % -- 80 bpm   23 0932 -- -- -- -- -- 97 % -- 81 bpm         Cervical Exam: 5 / 70% / -2      Position: Mid    Membranes: Leaking     Fetal Heart Rate:    Monitor: fetal spiral electrode    Variability: moderate (amplitude range 6 to 25 bpm)    Baseline Rate: normal range    Fetal Heart Rate Tracin/mod/+accels, no decels       Intake/Output Summary (Last 24 hours) at 2023 09  Last data filed at 2023 0700  Gross per 24 hour   Intake 4149 ml   Output 3800 ml   Net 349 ml      Component      Latest Ref Rng 2023  7:05 AM   WBC      4.0 - 11.0 10e3/uL 18.8 (H)    RBC Count      3.80 - 5.20 10e6/uL 4.08    Hemoglobin      11.7 - 15.7 g/dL 13.6    Hematocrit      35.0 - 47.0 % 39.4    MCV      78 - 100 fL 97    MCH      26.5 - 33.0 pg 33.3 (H)    MCHC      31.5 - 36.5 g/dL 34.5    RDW      10.0 - 15.0 % 13.9    Platelet Count      150 - 450 10e3/uL 170    Creatinine      0.51 - 0.95 mg/dL 0.63    GFR Estimate      >60 mL/min/1.73m2 >90    AST      0 - 45 U/L 20       Legend:  (H) High         Assessment:   Malena Thomas is a 33 year old  who is 34w2d here for IOL due to pre-e with SF, pregnancy also complicated by severe FGR with elevated UA dopplers and hx of HELLP/placental abruption with prior pregnancy           Plan:   -FHT currently cat I, she has had periods of cat II  overnight due to intermittent decels and minimal variability but currently reassuring. Continue continuous fetal monitoring, FSE in place   -S/P 20 mg IV labetalol around 0730 due to sustained severe range BP, currently mild range. Repeat HELLP labs normal this AM. Continue magnesium at 2g/hr and strict I/Os. Magnesium checks within normal range per RN assessment.   -Patient with protracted latent labor course but optimistic that she is now transitioning into active labor. She was ruptured at midnight last night with no significant cervical change, we discussed recommendation for CS if no change in 12-18 hours on DIVP following ROM but not yet meeting criteria. Will continue to titrate DIVP, MVU currently at 190   -Patient requesting epidural and anesthesia team (Dr. Vines) notified.     Dispo: anticipate , patient stable on     Karma Fields MD

## 2023-09-07 NOTE — PROVIDER NOTIFICATION
09/07/23 0133   Provider Notification   Provider Name/Title Dr Rincon   Method of Notification Phone   Request Evaluate - Remote   Notification Reason Other (Comment)     Strip reviewed with Dr. Rincon at 0135. Order put in for IV fentanyl per pt request. Repositioning and 250 bolus completed before fentanyl administration. Pit halved to 6 per provider before fentanyl administration. EFM as charted. Plan to watch MVU and go up on pit as needed.

## 2023-09-07 NOTE — PROVIDER NOTIFICATION
09/07/23 0050   Provider Notification   Provider Name/Title Dr. ERIK Rincon   Method of Notification Electronic Page   Request Evaluate in Person   Notification Reason Other (Comment)     Could you please come place IUPC and FSE? Difficulty tracing. Thanks!

## 2023-09-07 NOTE — PROGRESS NOTES
Intrapartum Progress Note    S: Patient states the contraction pain is getting much worse.      O: Patient Vitals for the past 4 hrs:   BP Temp Temp src Pulse Resp SpO2   23 2330 132/81 -- -- -- 18 91 %   23 2300 -- 97.9  F (36.6  C) Oral -- 18 100 %   23 2235 139/89 -- -- 88 18 100 %     ]  Gen: awake, alert, answering questions appropriately, appears uncomfortable during contractions  CV- regular rate  Lung- breathing comfortably on room air  Ext- bilateral lower extremities with trace edema  Cervix- 5/70/-2/soft/anterior at 0000    FHT:   Baseline 120 bpm   Variability moderate  Accels Present  Decels Absent     Moonshine:3-4 contractions / 10 minutes    Membranes: AROM with return of clear fluid    A/P: 33 year old  at 34w2d by 7w1d us who is undergoing induction of labor for pre-e with severe features, currently on pitocin.    Induction of labor:  S/p cook catheter.   Currently on pitocin at 24 mu/min.  Continue to titrate per protocol.  Patient is GBS positive, adequate on PCN  AROM performed with return of clear fluid  Pain- patient aware of options.  States she will ask for pain medications if she desires them when the time comes.      Pre-e with severe features:  Mild range BP over past 4 hours.  Last IV labetalol 20mg today at 1045 for sustained severe range BP  Mag 4g load at 1901 on > Mag at 2g/hr  UOP 250ml/hr  Continue magnesium until 24 hours postpartum.  Immediate acting antihypertensives PRN for sustained severe range BPs.    FWB:  Severe FGR with EFW of 1590g on 23, 2%tile  FHT Category 1, reactive and reassuring.    Cindy Rincon MD 0010

## 2023-09-08 ENCOUNTER — APPOINTMENT (OUTPATIENT)
Dept: INTERPRETER SERVICES | Facility: CLINIC | Age: 33
End: 2023-09-08
Payer: MEDICAID

## 2023-09-08 LAB
ALT SERPL W P-5'-P-CCNC: 8 U/L (ref 0–50)
AST SERPL W P-5'-P-CCNC: 22 U/L (ref 0–45)
CREAT SERPL-MCNC: 0.82 MG/DL (ref 0.51–0.95)
EGFRCR SERPLBLD CKD-EPI 2021: >90 ML/MIN/1.73M2
ERYTHROCYTE [DISTWIDTH] IN BLOOD BY AUTOMATED COUNT: 14 % (ref 10–15)
HCT VFR BLD AUTO: 38.3 % (ref 35–47)
HGB BLD-MCNC: 13.4 G/DL (ref 11.7–15.7)
MCH RBC QN AUTO: 34.1 PG (ref 26.5–33)
MCHC RBC AUTO-ENTMCNC: 35 G/DL (ref 31.5–36.5)
MCV RBC AUTO: 98 FL (ref 78–100)
PLATELET # BLD AUTO: 168 10E3/UL (ref 150–450)
RBC # BLD AUTO: 3.93 10E6/UL (ref 3.8–5.2)
WBC # BLD AUTO: 16.1 10E3/UL (ref 4–11)

## 2023-09-08 PROCEDURE — 258N000003 HC RX IP 258 OP 636

## 2023-09-08 PROCEDURE — 84450 TRANSFERASE (AST) (SGOT): CPT | Performed by: OBSTETRICS & GYNECOLOGY

## 2023-09-08 PROCEDURE — 84460 ALANINE AMINO (ALT) (SGPT): CPT | Performed by: OBSTETRICS & GYNECOLOGY

## 2023-09-08 PROCEDURE — 82565 ASSAY OF CREATININE: CPT | Performed by: OBSTETRICS & GYNECOLOGY

## 2023-09-08 PROCEDURE — 120N000002 HC R&B MED SURG/OB UMMC

## 2023-09-08 PROCEDURE — 250N000013 HC RX MED GY IP 250 OP 250 PS 637: Performed by: OBSTETRICS & GYNECOLOGY

## 2023-09-08 PROCEDURE — 85027 COMPLETE CBC AUTOMATED: CPT | Performed by: OBSTETRICS & GYNECOLOGY

## 2023-09-08 PROCEDURE — 36415 COLL VENOUS BLD VENIPUNCTURE: CPT | Performed by: OBSTETRICS & GYNECOLOGY

## 2023-09-08 PROCEDURE — 250N000011 HC RX IP 250 OP 636: Mod: JZ | Performed by: OBSTETRICS & GYNECOLOGY

## 2023-09-08 RX ORDER — SODIUM CHLORIDE, SODIUM LACTATE, POTASSIUM CHLORIDE, CALCIUM CHLORIDE 600; 310; 30; 20 MG/100ML; MG/100ML; MG/100ML; MG/100ML
INJECTION, SOLUTION INTRAVENOUS
Status: COMPLETED
Start: 2023-09-08 | End: 2023-09-08

## 2023-09-08 RX ADMIN — SENNOSIDES AND DOCUSATE SODIUM 1 TABLET: 50; 8.6 TABLET ORAL at 22:08

## 2023-09-08 RX ADMIN — ACETAMINOPHEN 975 MG: 325 TABLET, FILM COATED ORAL at 22:08

## 2023-09-08 RX ADMIN — IBUPROFEN 800 MG: 800 TABLET, FILM COATED ORAL at 22:08

## 2023-09-08 RX ADMIN — SENNOSIDES AND DOCUSATE SODIUM 1 TABLET: 50; 8.6 TABLET ORAL at 08:58

## 2023-09-08 RX ADMIN — ACETAMINOPHEN 975 MG: 325 TABLET, FILM COATED ORAL at 09:57

## 2023-09-08 RX ADMIN — KETOROLAC TROMETHAMINE 30 MG: 30 INJECTION, SOLUTION INTRAMUSCULAR at 03:17

## 2023-09-08 RX ADMIN — NIFEDIPINE 30 MG: 30 TABLET, FILM COATED, EXTENDED RELEASE ORAL at 08:58

## 2023-09-08 RX ADMIN — ACETAMINOPHEN 975 MG: 325 TABLET, FILM COATED ORAL at 15:55

## 2023-09-08 RX ADMIN — SODIUM CHLORIDE, POTASSIUM CHLORIDE, SODIUM LACTATE AND CALCIUM CHLORIDE 50 ML/HR: 600; 310; 30; 20 INJECTION, SOLUTION INTRAVENOUS at 14:26

## 2023-09-08 RX ADMIN — MAGNESIUM SULFATE HEPTAHYDRATE 2 G/HR: 40 INJECTION, SOLUTION INTRAVENOUS at 03:36

## 2023-09-08 RX ADMIN — ACETAMINOPHEN 975 MG: 325 TABLET, FILM COATED ORAL at 04:35

## 2023-09-08 RX ADMIN — KETOROLAC TROMETHAMINE 30 MG: 30 INJECTION, SOLUTION INTRAMUSCULAR at 15:55

## 2023-09-08 RX ADMIN — KETOROLAC TROMETHAMINE 30 MG: 30 INJECTION, SOLUTION INTRAMUSCULAR at 09:57

## 2023-09-08 ASSESSMENT — ACTIVITIES OF DAILY LIVING (ADL)
ADLS_ACUITY_SCORE: 18
ADLS_ACUITY_SCORE: 21
ADLS_ACUITY_SCORE: 18
ADLS_ACUITY_SCORE: 22
ADLS_ACUITY_SCORE: 18
ADLS_ACUITY_SCORE: 22
ADLS_ACUITY_SCORE: 18
ADLS_ACUITY_SCORE: 21

## 2023-09-08 NOTE — LACTATION NOTE
This note was copied from a baby's chart.    Lactation Admission Note      Baby Information:  Infant's first name:  Olivia (girl)  Infant medical history: Born 23 at 34w2d; SGA, RDS      needed? Yes; language needed: Brazilian  (speaks Brazilian and English)     Lactation goal (if known):  Breastfeed/direct latching  Lactation history:  No    NOTE per maternal chart: VERY POOR OB HISTORY: hx of 25w IUFD likely secondary to placental abruption. Pre-eclampsia with SF/HELLP syndrome, DIC, pulmonary edema and ICU admission. Next pregnancy complicated by 16w loss/delivery, unclear etiology and unclear if demised before or after delivery.     Mother's Information: Name: Malena  Occupation:    Age: 33  Delivery type:     [place]: [place]  Pump for home use:  To be given [employer]    Partner's name:   Charles  Occupation:      Relevant maternal medical and social hx:       Information for the patient's mother:  Malena Thomas [[id number]]     Patient Active Problem List   Diagnosis    Fetal demise in benito pregnancy greater than 22 weeks gestation, antepartum    Acute kidney failure, unspecified (H)    Need for Tdap vaccination    History of pre-eclampsia    Preeclampsia, severe, third trimester          Relevant maternal medications:   Nifedipine-Hale L2-limited data-probably compatible    Maternal risk factors:  Hypertension (details) Pre-eclampsia  Significant postpartum hemorrhage (EBL 1232)     Admission Education given:  [x]Admission packet  [x]Kangaroo care  [x]Benefits of breast milk  [x]How breast milk is made  [x]Stages of milk production  [x]Milk supply/ goal volumes  [x]Hand expression  [x]Hands-on pumping  [x]Collecting, labeling, transporting milk  [x]Cleaning, sanitizing pump parts  [x]Storage of milk      SHAYLEE Arias, RN, IBCLC   Lactation Consultant  Ascom: *[phone]  Office: [phone]

## 2023-09-08 NOTE — PROGRESS NOTES
Arrived to [facility] room 7120 at 2118. Report received from Kayla MITTAL RN.   Blood pressures elevated; not in treatable range. Afebrile. Fundus is firm, scant bleeding. Denies headache, visual disturbances, and RUQ pain. Denies shortness of breath; lung sounds clear. Denies nausea/vomiting; advancing diet as tolerated  Madeleine-care performed. John catheter is draining clear yellow urine, catheter emptied. IV's infusing - MagSulf infusing at 2gm/hr, Pitocin at 100mL/hr and LR at 10mL/hr. IV pumps cleared. SCD's on. Call light within reach.

## 2023-09-08 NOTE — ANESTHESIA POSTPROCEDURE EVALUATION
Patient: Malena Thomas    Procedure: Procedure(s):   section       Anesthesia Type:  Epidural    Note:  Disposition: Inpatient   Postop Pain Control: Uneventful            Sign Out: Well controlled pain   PONV: No   Neuro/Psych: Uneventful            Sign Out: Acceptable/Baseline neuro status   Airway/Respiratory: Uneventful            Sign Out: Acceptable/Baseline resp. status   CV/Hemodynamics: Uneventful            Sign Out: Acceptable CV status; No obvious hypovolemia; No obvious fluid overload   Other NRE:    DID A NON-ROUTINE EVENT OCCUR?      Epidural-to- Updated ASA: 3 Emergent      Last vitals:  Vitals Value Taken Time   /77 23   Temp     Pulse 79 23   Resp     SpO2 97 % 23   Vitals shown include unvalidated device data.    Electronically Signed By: Sotero Gonzalez MD  2023  8:37 PM

## 2023-09-08 NOTE — PLAN OF CARE
Problem: Postpartum ( Delivery)  Goal: Absence of Infection Signs and Symptoms  Outcome: Progressing  Goal: Optimal Pain Control and Function  Outcome: Progressing  Intervention: Prevent or Manage Pain  Recent Flowsheet Documentation  Taken 2023 0540 by Radha Ayon RN  Perineal Care:   absorbent brief/pad changed   perineum cleansed  Taken 2023 0240 by Radha Ayon RN  Perineal Care:   catheter care provided   perineum cleansed  Goal: Nausea and Vomiting Relief  Outcome: Progressing     Goal Outcome Evaluation:    Blood pressures remain elevated - not in treatable range. Pulse 70's, O2 sats 97-99% on room air, resp 16, afebrile. Denies headache, visual disturbances, and RUQ pain. Lung sounds clear. Denies shortness of breath/heaviness in chest. Continues to have MagSulf infusing at 2gm/hr. Reflexes minimal, clonus is absent. Ambulated to bathroom with standby assist of two. John catheter removed at 0540 - due to void by 0930. Brushed teeth, washed down with a cloth while in the bathroom. Pumped and was able to get about 5mL's of EBM. Denies having pain; taking scheduled Tylenol and Toradol. Support person at bedside sleeping. Plans to go to NICU after breakfast. Will be starting Nifedipine this morning for her elevated pressures. Continue with current plan of care.

## 2023-09-08 NOTE — PROGRESS NOTES
Progress Note    Discussed with RN and patient doing well in early post-op period. Denies pre-e sx and normal magnesium checks per RN. Adequate urine output since CS. Plan to continue magnesium at 2g/hr. Repeat HELLP labs were normal this AM. BP normal to mild range, Nifedipine 30 mg CR ordered to start in the AM but RN will call overnight if changes in vitals or other concerns.     Karma Fields MD

## 2023-09-08 NOTE — PLAN OF CARE
"Goal Outcome Evaluation:  Pt recovering as anticipated in PACU. Alert and oriented asking appropriate questions about her baby and what to anticipate with NICU. Pt denies SOB, N/V, dizziness or pain. Pt educated on importance of staying ahead of pain with toradol, but declined taking pain meds as she states \"I don't have pain.\" Pt BP's elevated but not severe range. Uterus is firm and midline with scant bleeding. Catheter cares completed. Plan to go to NICU to see baby prior to heading to [facility], following discharge from PACU.   "

## 2023-09-08 NOTE — PROGRESS NOTES
Vital signs WDL, afebrile. Denies headache, vision changes and RUQ pain. Denies shortness of breath/difficulty breathing. O2 sats 97% on room air. John is draining clear, pale yellow urine. SCD's remain on. No clonus present. Diminished reflexes bilaterally. Fundus is firm, scant bleeding. IV Pitocin stopped. MagSulf still infusing. Denies having any pain/discomfort. Garad at bedside and supportive.

## 2023-09-08 NOTE — PLAN OF CARE
Goal Outcome Evaluation:      Plan of Care Reviewed With: patient    Overall Patient Progress: improvingOverall Patient Progress: improving     Magnesium infusion discontinued @ 1700. IV saline locked. Pt tolerating PO fluids & regular diet. Pt still feeling tired, able to nap this evening.

## 2023-09-08 NOTE — PROGRESS NOTES
"[facility]    Obstetrics Post-Op / Progress Note    Assessment & Plan   Assessment:  -1 Day Post-Op  Procedure(s):   section  - Preeclampsia with severe features    Doing well.  No excessive bleeding  Pain well-controlled.  BPs stable in the mildly elevated range, most recent was normotensive.  On nifedipine 30mg XL, increase prn if BPs reman elevate  Cont MgSo4 x 24hr postdelivery, stop at 5pm    Plan:  Ambulation encouraged  Monitor wound for signs of infection  Pain control measures as needed  Awaiting am labs to be drawn  On nifedipine 30mg XL, increase prn if BPs reman elevate  Cont MgSo4 x 24hr postdelivery, stop at 5pm    Rayne Becerra MD     Interval History   Doing well.  Pain is well-controlled.  No fevers.  No history of wound drainage, warmth or significant erythema.  Good appetite.  Denies chest pain, shortness of breath, nausea or vomiting.  Ambulatory.  Baby in NICU.    Medications    BUPivacaine liposome (EXPAREL) LA inj was given in the infiltration site to produce post-op analgesia. Duration of action is up to 72 hours. Other \"george\" meds should not be given for 96 hours except for lidocaine 4% patch. This is for INFORMATION ONLY.      dextrose 5% lactated ringers      lactated ringers 10 mL/hr (23)    magnesium sulfate 2 g/hr (23 0800)    - MEDICATION INSTRUCTIONS -      - MEDICATION INSTRUCTIONS -      oxytocin in 0.9% NaCl 100 mL/hr (23)    oxytocin in 0.9% NaCl        acetaminophen  975 mg Oral Q6H    BUPivacaine liposome  20 mL Infiltration DURING SURGERY    ibuprofen  800 mg Oral Q6H    ketorolac  30 mg Intravenous Q6H    lactated ringers   Intravenous Once    magnesium sulfate  4 g Intravenous Once    NIFEdipine ER OSMOTIC  30 mg Oral Daily    senna-docusate  1 tablet Oral BID    Or    senna-docusate  2 tablet Oral BID    sodium chloride (PF)  3 mL Intracatheter Q8H    sodium chloride (PF)  3 mL " Intracatheter Q8H       Physical Exam   Temp: 97.6  F (36.4  C) Temp src: Oral BP: 132/78 Pulse: 75   Resp: 16 SpO2: 98 % O2 Device: None (Room air)    Vitals:    09/06/23 0700 09/07/23 0900 09/08/23 0240   Weight: 104.7 kg (230 lb 14.4 oz) 104.5 kg (230 lb 6.4 oz) 106.3 kg (234 lb 6.4 oz)     Vital Signs with Ranges  Temp:  [97.6  F (36.4  C)-98.6  F (37  C)] 97.6  F (36.4  C)  Pulse:  [75-80] 75  Resp:  [15-18] 16  BP: (107-154)/() 132/78  SpO2:  [95 %-99 %] 98 %  I/O last 3 completed shifts:  In: 5763 [P.O.:950; I.V.:4813]  Out: 4927 [Urine:3695; Blood:1232]    Uterine fundus is firm, non-tender and at the level of the umbilicus  Incision C/D/I, old blood on dressing, has not moved beyond outline placed.  Extremities Non-tender  Heart is regular rate and rhythm and lungs clear to auscultation    Data   Recent Labs   Lab Test 08/31/23  1530   AS Negative     Recent Labs   Lab Test 09/07/23  0705 09/06/23  0639   HGB 13.6 13.3     Recent Labs   Lab Test 03/23/23  1504   RUQIGG Positive

## 2023-09-08 NOTE — CONSULTS
Social Work Initial Consult    DATA/ASSESSMENT    General Information  Assessment completed with: Patient, Malena, and her . In person [place]  was used.  Type of visit: Initial Assessment      Reason for Consult: other (see comments) (NICU admission)    Living Environment:   Malena and her  live together in a home in [place]. They report having a strong family and friend support network nearby.   Current living arrangements: house          Able to return to prior arrangements: yes       Family Factors  Family Risk Factors:  first time parents, previous fetal losses, unexpected hospitalization, difficult labor and delivery  Family Strength Factors: able to advocate for selves, strong support system        Assessment of Support     Description of Support System: Supportive.        Employment/Financial  Malena stopped working while she was pregnant due to her high blood pressure. She does not have a timeline or feel pressured to return to work. Her partner is self-employed and is able to be flexible. They denied any financial worries. SW provided information on applying for [facility] and other [facility] benefits if they feel this would be helpful. Malena's  expressed feeling stressed about insurance and an outstanding bill from [employer]. SW provided patient and  with phone number for [place] [employer] [facility] if they continue to have questions.               Coping/Stress  Malena reported that her mental health was good during her pregnancy. She reported having a strong support network of other parents to help as they transition to parenting.              Additional Information:  Parents report having a car seat and safe place for baby to sleep. They said they expected to have longer to prepare their house as baby was early, but that everything is almost ready. They said they plan to take baby to a [place] clinic near home.     Parents alluded briefly to previous miscarriage but did not  express a desire to talk further about previous losses.     INTERVENTION    Conducted chart review and consulted with medical team regarding plan of care. Introduced SW role and scope of practice.     Provided assessment of patient and family's level of coping  Conducted psychosocial assessment   Validated emotions and provided supportive listening  Facilitated service linkage with hospital and community resources    Provided SW contact info for Odalis Quintero as she will follow family. Provided contact information for Financial Counseling. Provided information on applying for WIC.    PLAN    SW Odalis Quintero will follow.     KIM Quiles [facility]   On call pager number: [phone]

## 2023-09-08 NOTE — PROGRESS NOTES
CLINICAL NUTRITION SERVICES    Reviewed nutrition risk factors due to LOS. Pt is tolerating diet, eating well per nursing documentation and pt. No nutrition issues identified at this time. RD to sign off at this time. RD may be consulted if needs arise.     Louisa Nails MS, RDN, LD  RD pager: [phone]

## 2023-09-08 NOTE — PLAN OF CARE
Goal Outcome Evaluation:      Plan of Care Reviewed With: patient    Overall Patient Progress: improvingOverall Patient Progress: improving     BPs 120-140/70-90s this shift. Magnesium continues to infuse @ 2gm/hr. Pt denies HA, vision changes & epigastric pain. Denies incisional pain, taking scheduled toradol & tylenol. Up to the bathroom with minimal to no staff assist, tolerates well. Able to void w/o difficulty. Pt feeling very tired today, likely d/t Magnesium. Pt does not want to pump or visit NICU until more rested.  here this morning & supportive.

## 2023-09-08 NOTE — PLAN OF CARE
Goal Outcome Evaluation:   Data: Malena Thomas transferred to [facility] via cart at 2115. Baby remains in NICU Action: Receiving unit notified of transfer: Yes. Patient and family notified of room change. Report given to Radha at 2000. Belongings sent to receiving unit. Accompanied by Registered Nurse. Oriented patient to surroundings. Call light within reach. ID bands double-checked with receiving RN.  Response: Patient tolerated transfer and is stable.

## 2023-09-08 NOTE — PROGRESS NOTES
Status Update:   Blood pressure elevated, rest of vitals WDL; afebrile. Denies headache, vision changes and RUQ pain. Denies shortness of breath/difficulty breathing. O2 sats 97% on room air. John is draining clear, pale yellow urine. Assisted to standing at side of bed with a standby assist of 2. Gown changed, moira-care performed, underwear and pad placed, back washed. Was able to ambulate a few feed to the scale, but did report feeling a dizzy. Did not attempt to ambulate to bathroom, although she is eager to try in an hour or two. Assisted to sitting at side of bed and then to a Semi-Fowlers position. SCD's turned back on, John remains in place at this time. Was able to pump and did get a few drops at 0210. Dressing is remains on; UTV incision. Blood on dressing is not from incision; it is from lochia. Weight obtained. Abdominal binder implemented. Denies having any pain/discomfort and has not needed anything besides scheduled Tylenol and Toradol. Continue with current plan of care.

## 2023-09-09 PROCEDURE — 250N000013 HC RX MED GY IP 250 OP 250 PS 637: Performed by: OBSTETRICS & GYNECOLOGY

## 2023-09-09 PROCEDURE — 120N000002 HC R&B MED SURG/OB UMMC

## 2023-09-09 PROCEDURE — 250N000011 HC RX IP 250 OP 636

## 2023-09-09 RX ORDER — NIFEDIPINE 30 MG/1
60 TABLET, EXTENDED RELEASE ORAL DAILY
Status: DISCONTINUED | OUTPATIENT
Start: 2023-09-10 | End: 2023-09-09

## 2023-09-09 RX ORDER — NIFEDIPINE 30 MG/1
60 TABLET, EXTENDED RELEASE ORAL DAILY
Status: DISCONTINUED | OUTPATIENT
Start: 2023-09-09 | End: 2023-09-10 | Stop reason: HOSPADM

## 2023-09-09 RX ADMIN — OXYCODONE HYDROCHLORIDE 5 MG: 5 TABLET ORAL at 21:26

## 2023-09-09 RX ADMIN — ACETAMINOPHEN 975 MG: 325 TABLET, FILM COATED ORAL at 08:55

## 2023-09-09 RX ADMIN — IBUPROFEN 800 MG: 800 TABLET, FILM COATED ORAL at 08:55

## 2023-09-09 RX ADMIN — IBUPROFEN 800 MG: 800 TABLET, FILM COATED ORAL at 19:09

## 2023-09-09 RX ADMIN — NIFEDIPINE 60 MG: 30 TABLET, FILM COATED, EXTENDED RELEASE ORAL at 09:01

## 2023-09-09 RX ADMIN — SENNOSIDES AND DOCUSATE SODIUM 1 TABLET: 50; 8.6 TABLET ORAL at 08:54

## 2023-09-09 RX ADMIN — ACETAMINOPHEN 975 MG: 325 TABLET, FILM COATED ORAL at 19:08

## 2023-09-09 RX ADMIN — SENNOSIDES AND DOCUSATE SODIUM 2 TABLET: 50; 8.6 TABLET ORAL at 19:15

## 2023-09-09 RX ADMIN — LABETALOL HYDROCHLORIDE 20 MG: 5 INJECTION, SOLUTION INTRAVENOUS at 02:52

## 2023-09-09 ASSESSMENT — ACTIVITIES OF DAILY LIVING (ADL)
ADLS_ACUITY_SCORE: 18

## 2023-09-09 NOTE — PLAN OF CARE
Goal Outcome Evaluation:      Elevated blood pressures overnight have resolved, procardia increased to 60 mg for morning dose. All other vital signs and postpartum assessments within normal limits. Fundus is midline, firm, and 1 cm below umbilicus. Lochia is scant. Pt denies headache, vision changes, SOB, RUQ pain. Reflexes +2 average, no clonus.Up ad bebeto, voiding without difficulty, adequate input/output. No post-mag interventions needed at this time. Pt is using breast pump with assistance from nurse. Denies pain and is declining pain medications. Planning to visit NICU. Will continue with education and plan of care.

## 2023-09-09 NOTE — PROVIDER NOTIFICATION
09/09/23 0153   Critical Test Results/Notification   Provider Notified yes   Date of Provider Notification 09/09/23   Time of Provider Notification 0154   Mechanism of Provider notification page   What Provider Did You Notify? Dr. Becerra   Response orders obtained     Elevated BP, no other s/s. Dr. Becerra advised to administer IV labetalol and follow algorithm, increased procardia for morning dose to 60 mg.

## 2023-09-09 NOTE — PROGRESS NOTES
"[facility]    Obstetrics Post-Op / Progress Note    Assessment & Plan   Assessment:  -2 Days Post-Op  Procedure(s):   section    Doing well.  Clean wound without signs of infection.  Normal healing wound.  No immediate surgical complications identified.  No excessive bleeding  Pain well-controlled.  BP severe range overnight.  Nifedipine increased to 60mg this morning.  S/p magnesium x 24 hours    Plan:  Increase Nifedipine prn.  Close monitoring of BP.   Ambulation encouraged  Breast feeding strategies discussed.  Discussed need to pump q2-3 hours to encourage milk to come in, even if she's not getting much (or anything at all at this point)  Shower and remove bandage today.  Pain control measures as needed  Anticipate discharge in 1-3 days, pending BP control    Isaura Finch MD     Interval History   Doing well.  Pain is well-controlled.  No fevers.  No history of wound drainage, warmth or significant erythema.  Good appetite.  Denies chest pain, shortness of breath, nausea or vomiting.  Ambulatory.  Pumping for baby in NICU.  Doesn't sound like she's seen baby in NICU yet, but does plan to go there today.  Wants to hear how she's doing, but does have videos of baby in NICU sent by her .    Medications    BUPivacaine liposome (EXPAREL) LA inj was given in the infiltration site to produce post-op analgesia. Duration of action is up to 72 hours. Other \"george\" meds should not be given for 96 hours except for lidocaine 4% patch. This is for INFORMATION ONLY.      dextrose 5% lactated ringers      lactated ringers Stopped (23 0469)    - MEDICATION INSTRUCTIONS -      - MEDICATION INSTRUCTIONS -      oxytocin in 0.9% NaCl 100 mL/hr (23 0227)    oxytocin in 0.9% NaCl        acetaminophen  975 mg Oral Q6H    ibuprofen  800 mg Oral Q6H    lactated ringers   Intravenous Once    magnesium sulfate  4 g Intravenous Once    NIFEdipine ER OSMOTIC  60 " mg Oral Daily    senna-docusate  1 tablet Oral BID    Or    senna-docusate  2 tablet Oral BID    sodium chloride (PF)  3 mL Intracatheter Q8H    sodium chloride (PF)  3 mL Intracatheter Q8H       Physical Exam   Temp: 98.5  F (36.9  C) Temp src: Oral BP: (!) 141/91 Pulse: 78   Resp: 18 SpO2: 98 % O2 Device: None (Room air)    Vitals:    09/07/23 0900 09/08/23 0240 09/09/23 0706   Weight: 104.5 kg (230 lb 6.4 oz) 106.3 kg (234 lb 6.4 oz) 105.7 kg (233 lb 1.6 oz)     Vital Signs with Ranges  Temp:  [98.1  F (36.7  C)-99.1  F (37.3  C)] 98.5  F (36.9  C)  Pulse:  [] 78  Resp:  [16-18] 18  BP: (114-171)/() 141/91  SpO2:  [96 %-100 %] 98 %  I/O last 3 completed shifts:  In: 2891.34 [P.O.:1820; I.V.:1071.34]  Out: 2700 [Urine:2700]    Uterine fundus is firm, non-tender and at the level of the umbilicus  Bandage in place.    Data   Recent Labs   Lab Test 08/31/23  1530   AS Negative     Recent Labs   Lab Test 09/08/23  0843 09/07/23  0705   HGB 13.4 13.6     Recent Labs   Lab Test 03/23/23  1504   RUQIGG Positive

## 2023-09-09 NOTE — PLAN OF CARE
Goal Outcome Evaluation:      Plan of Care Reviewed With: patient    Overall Patient Progress: improvingOverall Patient Progress: improving       Data: Blood pressures rising, but no severe range pressures.  The rest of vitals have been WDL. She denies headache, vision changes, SOB, RUQ pain. Reflexes +2, no clonus. Other postpartum assessments WNL. She is voiding without difficulty, up ad bebeto, passing gas, eating and drinking without nausea. Perineum appears to be healing well, lochia WNL, no s/s infection.   [place] in the NICU; pumping.   Taking ibuprofen and tylenol for pain and using abdominal binder. Reports good pain management.   Action: Education provided on plan of care  Response: Pt is agreeable with her plan of care. Goes to NICU to visit the baby. No support person currently present. Anticipate discharge per care plan.

## 2023-09-09 NOTE — PROVIDER NOTIFICATION
09/09/23 0807   Provider Notification   Provider Name/Title Dr. Finch   Method of Notification Electronic Page   Request Evaluate-Remote   Notification Reason Medication Request     Dr. Becerra told night nurse pt's procardia should increase to 60 mg this a.m., order was not placed. should I give the 30 or will you increase to 60? thx

## 2023-09-10 VITALS
TEMPERATURE: 98.4 F | HEIGHT: 63 IN | SYSTOLIC BLOOD PRESSURE: 141 MMHG | OXYGEN SATURATION: 98 % | WEIGHT: 233.1 LBS | RESPIRATION RATE: 16 BRPM | HEART RATE: 115 BPM | BODY MASS INDEX: 41.3 KG/M2 | DIASTOLIC BLOOD PRESSURE: 98 MMHG

## 2023-09-10 PROCEDURE — 250N000013 HC RX MED GY IP 250 OP 250 PS 637: Performed by: OBSTETRICS & GYNECOLOGY

## 2023-09-10 RX ORDER — OXYCODONE HYDROCHLORIDE 5 MG/1
5 TABLET ORAL EVERY 4 HOURS PRN
Qty: 6 TABLET | Refills: 0 | Status: SHIPPED | OUTPATIENT
Start: 2023-09-10

## 2023-09-10 RX ORDER — HYDROCHLOROTHIAZIDE 12.5 MG/1
12.5 TABLET ORAL DAILY
Qty: 7 TABLET | Refills: 0 | Status: SHIPPED | OUTPATIENT
Start: 2023-09-10 | End: 2023-09-17

## 2023-09-10 RX ORDER — HYDROCHLOROTHIAZIDE 12.5 MG/1
12.5 TABLET ORAL DAILY
Status: DISCONTINUED | OUTPATIENT
Start: 2023-09-10 | End: 2023-09-10 | Stop reason: HOSPADM

## 2023-09-10 RX ORDER — AMOXICILLIN 250 MG
1 CAPSULE ORAL DAILY
Qty: 100 TABLET | Refills: 0 | Status: SHIPPED | OUTPATIENT
Start: 2023-09-10

## 2023-09-10 RX ORDER — ACETAMINOPHEN 325 MG/1
650 TABLET ORAL EVERY 6 HOURS PRN
Qty: 100 TABLET | Refills: 0 | Status: SHIPPED | OUTPATIENT
Start: 2023-09-10

## 2023-09-10 RX ORDER — IBUPROFEN 600 MG/1
600 TABLET, FILM COATED ORAL EVERY 6 HOURS PRN
Qty: 60 TABLET | Refills: 0 | Status: SHIPPED | OUTPATIENT
Start: 2023-09-10

## 2023-09-10 RX ADMIN — IBUPROFEN 800 MG: 800 TABLET, FILM COATED ORAL at 11:11

## 2023-09-10 RX ADMIN — NIFEDIPINE 60 MG: 30 TABLET, FILM COATED, EXTENDED RELEASE ORAL at 07:55

## 2023-09-10 RX ADMIN — ACETAMINOPHEN 975 MG: 325 TABLET, FILM COATED ORAL at 01:32

## 2023-09-10 RX ADMIN — IBUPROFEN 800 MG: 800 TABLET, FILM COATED ORAL at 01:32

## 2023-09-10 RX ADMIN — OXYCODONE HYDROCHLORIDE 5 MG: 5 TABLET ORAL at 06:02

## 2023-09-10 RX ADMIN — SENNOSIDES AND DOCUSATE SODIUM 1 TABLET: 50; 8.6 TABLET ORAL at 07:55

## 2023-09-10 RX ADMIN — HYDROCHLOROTHIAZIDE 12.5 MG: 12.5 TABLET ORAL at 11:37

## 2023-09-10 RX ADMIN — ACETAMINOPHEN 975 MG: 325 TABLET, FILM COATED ORAL at 11:11

## 2023-09-10 ASSESSMENT — ACTIVITIES OF DAILY LIVING (ADL)
ADLS_ACUITY_SCORE: 18

## 2023-09-10 NOTE — DISCHARGE INSTRUCTIONS
Warning Signs after Having a Baby    Keep this paper on your fridge or somewhere else where you can see it.    Call your provider if you have any of these symptoms up to 12 weeks after having your baby.    Thoughts of hurting yourself or your baby  Pain in your chest or trouble breathing  Severe headache not helped by pain medicine  Eyesight concerns (blurry vision, seeing spots or flashes of light, other changes to eyesight)  Fainting, shaking or other signs of a seizure    Call 9-1-1 if you feel that it is an emergency.     The symptoms below can happen to anyone after giving birth. They can be very serious. Call your provider if you have any of these warning signs.    My provider s phone number: _______________________    Losing too much blood (hemorrhage)    Call your provider if you soak through a pad in less than an hour or pass blood clots bigger than a golf ball. These may be signs that you are bleeding too much.    Blood clots in the legs or lungs    After you give birth, your body naturally clots its blood to help prevent blood loss. Sometimes this increased clotting can happen in other areas of the body, like the legs or lungs. This can block your blood flow and be very dangerous.     Call your provider if you:  Have a red, swollen spot on the back of your leg that is warm or painful when you touch it.   Are coughing up blood.     Infection    Call your provider if you have any of these symptoms:  Fever of 100.4 F (38 C) or higher.  Pain or redness around your stitches if you had an incision.   Any yellow, white, or green fluid coming from places where you had stitches or surgery.    Mood Problems (postpartum depression)    Many people feel sad or have mood changes after having a baby. But for some people, these mood swings are worse.     Call your provider right away if you feel so anxious or nervous that you can't care for yourself or your baby.    Preeclampsia (high blood pressure)    Even if you  didn't have high blood pressure when you were pregnant, you are at risk for the high blood pressure disease called preeclampsia. This risk can last up to 12 weeks after giving birth.     Call your provider if you have:   Pain on your right side under your rib cage  Sudden swelling in the hands and face    Remember: You know your body. If something doesn't feel right, get medical help.     For informational purposes only. Not to replace the advice of your health care provider. Copyright 2020 [employer] [facility]. All rights reserved. Clinically reviewed by Caridad Greenwood, RNC-OB, MSN. [employer] [id number] - Rev 02/23.  Checking Your Blood Pressure at Home  During and after pregnancy  How do I measure my blood pressure?  It's important to take the readings at the same time each day, such as morning and evening. Take your blood pressure before taking any morning medications.  How to get the most accurate reading  30 minutes before checking your blood pressure, avoid the following:  Drinking caffeine  Drinking alcohol  Eating  Smoking  Exercising  5 minutes before checking your blood pressure:  Use the bathroom and urinate so that you have an empty bladder.  Sit still in a chair for around 5 minutes. Stay calm and relaxed and do not talk if possible.     To check your blood pressure:  Sit up straight in a chair.  Place your feet on the floor. Don't cross your ankles or legs.  Rest your arm at the level of your heart on a table or desk or on the arm of a chair. Use the same arm every day.  Pull up your shirt sleeve. Don't take the measurement over clothes.  Wrap the blood pressure cuff around the upper part of your left arm, 1 inch (2.5 cm) above your elbow.  Fit the cuff snugly around your arm. You should be able to place only one finger between the cuff and your arm.  Position the cord so that it rests in the bend of your elbow.  continued  Press the power button.  Sit quietly while the cuff inflates and  deflates.  Read the digital reading on the monitor screen and write the numbers down (record them) in a notebook.  Wait 2-3 minutes, then repeat the steps, starting at step 1.  Which features do you need?  Arm cuff monitors give the most exact readings.  Wrist and finger blood pressure monitors are often less exact.  Pick a blood pressure monitor that has passed tests to show they measure exactly. Blood pressure cuffs for sale in the [place] that have passed tests are listed on the website www.validatebp.org.  Some monitors that have passed tests are:  Omron 3 Series Upper Arm Blood Pressure Monitor (Model LF6592)  Omron 5 Series Upper Arm Blood Pressure Monitor (Model AX2027)  Omron 7 Series Upper Arm Blood Pressure Monitor (Model HEM-7320)  [employer] Upper Arm Blood Pressure Monitor with Talking Function (UA 1030T)  Don't use smartphone apps. There are many smartphone apps that claim to check your blood pressure using the pulse in your wrist or finger. These don't work. They haven't passed any tests. Don't give your clinic a blood pressure reading from a smartphone negro.  If you have a flexible spending account (FSA) or health savings account (HSA), you may wish to pay yourself back (reimburse) for the machine and cuff. A blood pressure monitor is an allowed over-the- counter (OTC) item to pay yourself back from these accounts.  Cuff size  The size of the arm cuff is a key feature. Make sure the cuff is the right size for your arm. If the cuff isn't the right size, readings will either be too high or low.  To know what size cuff to buy, measure the distance around your bicep (upper arm).  Use a flexible measuring tape or . Place the measuring tape FPC between your armpit and elbow. Measure the distance around your arm in inches.  You may need to buy a cuff apart from the machine to get the right size.    Cuff sizes and arm measurements  Small adult: 22 to 26 cm (8.7 to 10.2 inches)  Adult: 27 to 34 cm  (10.6 to 13.4 inches)  Large adult: 35 to 44 cm (13.8 to 17.3 inches)  Adult thigh: 45 to 52 cm (17.7 to 20.5 inches)  For informational purposes only. Not to replace the advice of your health care provider. Photo: ID [id number]   [employer]. Text copyright   2023 [facility]. All rights reserved. Clinically reviewed by [facility]. [employer] [id number] - Rev 03/23.

## 2023-09-10 NOTE — DISCHARGE SUMMARY
[facility]    Discharge Summary  Obstetrics    Date of Admission:  2023  Date of Discharge:  9/10/2023  Discharging Provider: Rayne Becerra MD    Discharge Diagnoses   Primary  section   Preeclampsia with severe features  Postpartum hypertension    Procedure/Surgery Information   Procedure: Procedure(s):   section   Surgeon(s): Surgeon(s) and Role:     * Karma Fields MD - Primary     History of Present Illness   Malena Thomas is a 33 year old female who was admitted at 33w2d with the diagnosis of preeclampsia with SF based on BP criteria.  She has a h/o 25w IUFD likely secondary to placental abruption in the setting of pre-e with SF/HELLP syndrome.  She ultimately developed DIC and pulmonary edema pos delivery and required IUC cares during that admission.      This current pregnancy has been complicated by FGR with abnormal dopplers, followed by MFM.    Hospital Course   She was admitted to antepartum, given BMTZ and BPs/labs were monitored closely and managed with medication.  At 34w she was transferred to L&D, MgSo4 prophylaxis was restarted and induction of labor was initiated.  She was induced with cervical ripening ballon, pitocin and AROM.  She eventually made change to 5 cm but no further significant change after 16 hours following rupture despite adequate contractions on pitocin so the decision was made to proceed with a  section.     The  was LTCS with 2 layer closure.  QBL 1232ml, no complications.  The baby was taken to NICU as planned.  Please see operative report for further details.    The patient's hospital course was unremarkable.  She recovered as anticipated and experienced no post-operative complications. She received MgSo4 prophylaxis for 24hr post delivery and was started on nifedipine 30mg XL daily at delivery.  This was increased to 60mg XL on POD 2 due to episode of severe range BPs.  On POD 3  hydrochlorothiazide 12,5mg was added due to intermittent, but not consistent mildly elevated BPs.     On POD 3 she strongly desired discharge home.On discharge, her pain was well controlled.BPs were predominantly normal to mildly elevated.  Vaginal bleeding is similar to peak menstrual flow.  Voiding without difficulty.  Ambulating well and tolerating a normal diet.  No fever or significant wound drainage.  Infant is stable in the NICU.  She was discharged on post-partum day #3.  She will follow up in office in next 3 days for BP check.    Post-partum hemoglobin:   Hemoglobin   Date Value Ref Range Status   2023 13.4 11.7 - 15.7 g/dL Final       Sage Depression Scale  Thoughts of Harming Self:    Total Score:        Rayne Becerra MD    Discharge Disposition   Discharged to home   Condition at discharge: Stable    Pending Results   Final pathology results: Pending at time of discharge  These results will be followed up by surgeon  Unresulted Labs Ordered in the Past 30 Days of this Admission       Date and Time Order Name Status Description    2023  8:11 PM Placenta path order and indications In process             Primary Care Physician   Physician No Ref-Primary    Consultations This Hospital Stay   NURSE PRACT  IP CONSULT  SOCIAL WORK IP CONSULT  ANESTHESIOLOGY IP CONSULT  LACTATION IP CONSULT    Discharge Orders      Breast Pump DME    Breast Pump Documentation:   Manual/Electric Pump: To support adequate breast milk production and nutrition for infant.     I, the undersigned, certify that the above prescribed supplies are medically necessary for this patient and is both reasonable and necessary in reference to accepted standards of medical and necessary in reference to accepted standards of medical practice in the treatment of this patient's condition and is not prescribed as a convenience.     Breast Pump Order    Breast Pump Documentation:   Hospital Grade Pump:  Mom and baby are   (baby is still in the hospital)    I, the undersigned, certify that the above prescribed supplies are medically necessary for this patient and is both reasonable and necessary in reference to accepted standards of medical and necessary in reference to accepted standards of medical practice in the treatment of this patient's condition and is not prescribed as a convenience.     Discharge Medications   Current Discharge Medication List        START taking these medications    Details   acetaminophen (TYLENOL) 325 MG tablet Take 2 tablets (650 mg) by mouth every 6 hours as needed for mild pain Start after Delivery.  Qty: 100 tablet, Refills: 0    Associated Diagnoses: S/P  section      hydrochlorothiazide (HYDRODIURIL) 12.5 MG tablet Take 1 tablet (12.5 mg) by mouth daily for 7 days  Qty: 7 tablet, Refills: 0    Associated Diagnoses: Postpartum hypertension      ibuprofen (ADVIL/MOTRIN) 600 MG tablet Take 1 tablet (600 mg) by mouth every 6 hours as needed for moderate pain Start after delivery  Qty: 60 tablet, Refills: 0    Associated Diagnoses: S/P  section      NIFEdipine ER OSMOTIC (ADALAT CC) 60 MG 24 hr tablet Take 1 tablet (60 mg) by mouth daily  Qty: 30 tablet, Refills: 1    Associated Diagnoses: Postpartum hypertension      oxyCODONE (ROXICODONE) 5 MG tablet Take 1 tablet (5 mg) by mouth every 4 hours as needed for moderate to severe pain  Qty: 6 tablet, Refills: 0    Associated Diagnoses: S/P  section      senna-docusate (SENOKOT-S/PERICOLACE) 8.6-50 MG tablet Take 1 tablet by mouth daily Start after delivery.  Qty: 100 tablet, Refills: 0    Associated Diagnoses: S/P  section           CONTINUE these medications which have NOT CHANGED    Details   famotidine (PEPCID) 20 MG tablet Take 1 tablet (20 mg) by mouth daily  Qty: 90 tablet, Refills: 1    Associated Diagnoses: Supervision of high risk pregnancy in second trimester      Prenatal Vit-Fe Fumarate-FA (PRENATAL  MULTIVITAMIN W/IRON) 27-0.8 MG tablet Take 1 tablet by mouth daily  Qty: 90 tablet, Refills: 3    Associated Diagnoses: Supervision of high-risk pregnancy, first trimester           STOP taking these medications       aspirin 81 MG EC tablet Comments:   Reason for Stopping:         docusate sodium (COLACE) 100 MG capsule Comments:   Reason for Stopping:             Allergies   No Known Allergies

## 2023-09-10 NOTE — LACTATION NOTE
Lactation Follow Up Note    Reason for visit/ call:  Rental pump for discharge  Supply check    Supply:  Malena shares she is trying to pump on a schedule but finds this challenging sometimes. We reviewed the benefit of using the pumping log to track pump frequency and volumes and also ways to meet pumping goals throughout the day.   Malena does not know specific volumes but she believes she pumps <5ml from each breast.    Significant changes (medications, equipment, comfort, etc):  Discharging to home today. Dispensed a rental Symphony pump, second pump kit and extra pumping bottles.     Education given:  How to contact  Home Medical if having issues with rental pump and the need to return rental pump to  Home Medical.  Pumping frequency and benefits of using the pumping log  Hands on pumping  Inpatient lactation support    Plan:  Discharging today. Rental pump given. Reviewed that next check in will be at 5 days or sooner if Malena  has questions/concerns or infant is able to start latching at the breast.     Cassandra rBitt RN, IBCLC   Lactation Consultant  Ascom: *[phone]  Office: [phone]

## 2023-09-10 NOTE — PLAN OF CARE
Postpartum assessments within normal limits, mild edema in both ankles and feet. VSS. Blood pressure has been within range overnight. Pt denies headache, vision changes, SOB, RUQ pain. Reflexes 2+ average, no clonus. No post-mag interventions needed at this time. Up ad bebeto, voiding without difficulty, adequate input and output. Fundus is midline, firm and 1 cm below umbilicus. Lochia is scant. Pt is Pumping with minimal  assistance, and plans to visit NICU. Reports good pain management with tylenol and ibuprofen. Encouraged more activity out of bed and adequate hydration. No family support person overnight. Will continue with education and plan of care.

## 2023-09-10 NOTE — PLAN OF CARE
Pt is stable, BP elevated but not in the treatment range, see flow. Denies HA, vision changes, RUQ pain/discomfort. Normal reflexes no clonus. 2+ LE edema. Ambulatory, adequate output. Pumping every 3 hours. Will continue to monitor per plan.

## 2023-09-10 NOTE — PLAN OF CARE
Goal Outcome Evaluation:      Plan of Care Reviewed With: patient    Overall Patient Progress: improvingOverall Patient Progress: improving       Data: Vital signs stable, postpartum assessments within normal limits.   Eating and drinking without nausea or vomiting.  Up ad bebeto, and voiding without difficulty. Passing gas/BM.  Baby is in the NICU - mom pumping and visiting  Pain managed with tylenol and ibuprofen. Pt states she is comfortable.  Incision appears to be healing well, no s/s infection.  Discharge outcomes on care plan met.   Action: Review of care plan, teaching, and discharge instructions done.  Response: Patient states understanding and comfort with her discharge instructions and plan of care. All questions addressed. She will discharge home.

## 2023-09-10 NOTE — PROGRESS NOTES
"[facility]    Obstetrics Post-Op / Progress Note    Assessment & Plan   Assessment:  -3 Days Post-Op  Procedure(s):   section  -Preeclampsia with SF  -Postpartum hypertension    Doing well.  Clean wound without signs of infection.  Normal healing wound.  No immediate surgical complications identified.  No excessive bleeding  Pain well-controlled.  BPs stable    Plan:  Ambulation encouraged  Breast feeding strategies discussed  Will begin hydrochlorothiazide 12.5mg and monitor BPs, if remain stable and wnl to low mild, discharge later today    Rayne Becerra MD     Interval History   Doing well.  Pain is well-controlled.  No fevers.  No history of wound drainage, warmth or significant erythema.  Good appetite.  Denies chest pain, shortness of breath, nausea or vomiting.  Ambulatory.  Baby in NICU.    Malena strongly desires discharge home today.  Feels she is comfortable with home BP checks and close outpt follow-up as needed.  We discussed her BPs are borderline for increasing meds, so considering hydrochlorothiazide to help with ongoing fluid sifts and she is agreeable.  I recommended monitoring BPs on this new med at least throughout today and she stated she really wants to leave ASAP, but agree to stay while await rx fills, etc.    Medications    BUPivacaine liposome (EXPAREL) LA inj was given in the infiltration site to produce post-op analgesia. Duration of action is up to 72 hours. Other \"george\" meds should not be given for 96 hours except for lidocaine 4% patch. This is for INFORMATION ONLY.      dextrose 5% lactated ringers      lactated ringers Stopped (23 1707)    - MEDICATION INSTRUCTIONS -      - MEDICATION INSTRUCTIONS -      oxytocin in 0.9% NaCl 100 mL/hr (23 2993)    oxytocin in 0.9% NaCl        acetaminophen  975 mg Oral Q6H    hydrochlorothiazide  12.5 mg Oral Daily    ibuprofen  800 mg Oral Q6H    lactated ringers   Intravenous " Once    magnesium sulfate  4 g Intravenous Once    NIFEdipine ER OSMOTIC  60 mg Oral Daily    senna-docusate  1 tablet Oral BID    Or    senna-docusate  2 tablet Oral BID    sodium chloride (PF)  3 mL Intracatheter Q8H    sodium chloride (PF)  3 mL Intracatheter Q8H       Physical Exam   Temp: 98.4  F (36.9  C) Temp src: Oral BP: (!) 141/98 Pulse: 115   Resp: 16   O2 Device: None (Room air)    Vitals:    09/07/23 0900 09/08/23 0240 09/09/23 0706   Weight: 104.5 kg (230 lb 6.4 oz) 106.3 kg (234 lb 6.4 oz) 105.7 kg (233 lb 1.6 oz)     Vital Signs with Ranges  Temp:  [98.4  F (36.9  C)-99.5  F (37.5  C)] 98.4  F (36.9  C)  Pulse:  [] 115  Resp:  [16-20] 16  BP: (119-152)/(76-98) 141/98  I/O last 3 completed shifts:  In: 1700 [P.O.:1700]  Out: 3500 [Urine:3500]    Uterine fundus is firm, non-tender and at the level of the umbilicus  Incision C/D/I  Extremities Non-tender  Heart is regular rate and rhythm and lungs clear to auscultation    Data   Recent Labs   Lab Test 08/31/23  1530   AS Negative     Recent Labs   Lab Test 09/08/23  0843 09/07/23  0705   HGB 13.4 13.6     Recent Labs   Lab Test 03/23/23  1504   RUQIGG Positive

## 2023-09-11 ENCOUNTER — APPOINTMENT (OUTPATIENT)
Dept: INTERPRETER SERVICES | Facility: CLINIC | Age: 33
End: 2023-09-11
Payer: MEDICAID

## 2023-09-11 NOTE — PROGRESS NOTES
Return OB visit    Subjective:  Patient reports active fetal movement, no vaginal bleeding or leaking fluid. She denies contractions. She denies headache, visual changes, RUQ /epigastric pain.        Objective:  /88 (BP Location: Right arm, Patient Position: Sitting, Cuff Size: Adult Large)   Pulse 84   Temp 98.4  F (36.9  C)   Wt 106.2 kg (234 lb 1.6 oz)   LMP 2023   SpO2 97%   BMI 42.82 kg/m     Initial /91     See OB flow sheet    Assessment and Plan    Malena Thomas is a 33 year old  at 33w2d here for REBA visit,   -Given patient's poor OB history and HELLP at 25 weeks as well as some difficulty coordinating outpatient care, I recommended that she be evaluated on labor and delivery due to isolated mild range BP. We discussed the plan for serial BP, labs and fetal monitoring and if everything is reassuring, I will see her later this week for close outpatient follow up.     Dispo: patient brought to labor and delivery     Karma Fields MD

## 2023-09-13 ENCOUNTER — PRENATAL OFFICE VISIT (OUTPATIENT)
Dept: OBGYN | Facility: CLINIC | Age: 33
End: 2023-09-13
Payer: MEDICAID

## 2023-09-13 VITALS
RESPIRATION RATE: 16 BRPM | TEMPERATURE: 97.3 F | SYSTOLIC BLOOD PRESSURE: 133 MMHG | WEIGHT: 218.7 LBS | HEART RATE: 104 BPM | BODY MASS INDEX: 38.74 KG/M2 | DIASTOLIC BLOOD PRESSURE: 74 MMHG

## 2023-09-13 DIAGNOSIS — K59.00 CONSTIPATION, UNSPECIFIED CONSTIPATION TYPE: ICD-10-CM

## 2023-09-13 PROCEDURE — 99213 OFFICE O/P EST LOW 20 MIN: CPT | Mod: 24 | Performed by: OBSTETRICS & GYNECOLOGY

## 2023-09-13 RX ORDER — POLYETHYLENE GLYCOL 3350 17 G/17G
1 POWDER, FOR SOLUTION ORAL DAILY
Qty: 578 G | Refills: 0 | Status: SHIPPED | OUTPATIENT
Start: 2023-09-13

## 2023-09-13 NOTE — PROGRESS NOTES
S; Malena Thomas is a 33 year old  who is 6 days s/p LTCS for failed IOL in the setting of pre-e with SF at 34w.  She received MgSo4 x 24hr post delivery and was started on nifedipine.  Ultimately did well and was discharge on PPD 3 on 60mg nifedipine with plan to begin hydrochlorothiazide 12.5mg.      She reports she is doing well and feels good.  Pain in under control.  She is eating and voiding without difficulty.  She prefers miralax to senna, desires rx.  She has been having regular BM    BPs at home have been 120/70's mostly.  None >140/90.  Checking 1-2 times daily.  Baby is on 11th floor in NICU.    O: /74 (BP Location: Right arm, Patient Position: Sitting, Cuff Size: Adult Regular)   Pulse 104   Temp 97.3  F (36.3  C)   Resp 16   Wt 99.2 kg (218 lb 11.2 oz)   LMP 2023   Breastfeeding No   BMI 38.74 kg/m      Gen: NAD  Abd: SNT  Incision: C/D/I, steri-strips removed.  Light bruising on edge of panus about 4 cm above incision.  No induration, erythema or drainage.  Ex; no calf tend    A/P: PP hypertension s/p LTCS after failed IOL for  severe preeclampsia   She is doing well and recovery going as expected.  BPs are stable on current nifedipine regimen of 60mg daily.  Since she has no picked up the hydrochlorothiazide yet, I don;t think she needs to begin at this time.  Rx for miralax faxed.  Will plan BP check in 2 weeks for potential med adjustment.  Cont activity restrictions.  Questions answered    MARIO ALBERTO HENRIQUEZ MD

## 2023-09-14 ENCOUNTER — TELEPHONE (OUTPATIENT)
Dept: NURSING | Facility: CLINIC | Age: 33
End: 2023-09-14

## 2023-09-14 NOTE — TELEPHONE ENCOUNTER
----- Message from Amalia Duckworth sent at 9/14/2023  7:47 AM CDT -----  Regarding: FW: bp check    ----- Message -----  From: Rayne Becerra MD  Sent: 9/13/2023   4:28 PM CDT  To: Rd Reception  Subject: bp check                                         Please contact Malena to schedule BP check in 2 weeks with her preferred MD.  Thanks    RAYNE BECERRA MD

## 2023-09-17 ENCOUNTER — LACTATION ENCOUNTER (OUTPATIENT)
Age: 33
End: 2023-09-17

## 2023-09-17 NOTE — LACTATION NOTE
This note was copied from a baby's chart.  Lactation Follow Up Note    Reason for visit/ call:  Comfort/supply check DOL 10    Supply:  1 full bottle/pp every 2.5-3 hours, plus BF volumes 2x/d     Significant changes (medications, equipment, comfort, etc):  none    Skin to skin/ nuzzling/ latching:  BF 2x/d    Education given:  Gave Haaka to use on one side when BF on the other since she leaks when baby is at breast. States she feels pain when it is time to pump, but resolves with pumping. Encouraged pumping every 2-3 hours coordinating with baby's feeding schedule, adjusting if she plans to BF. Gave her breast pads to use.    Plan:  Continued lactation support.    Apple Espinal, RNC-DULCE, IBCLC   Lactation Consultant  Ascom: [phone]  Office: [phone]

## 2023-09-18 ENCOUNTER — TELEPHONE (OUTPATIENT)
Dept: OBGYN | Facility: CLINIC | Age: 33
End: 2023-09-18
Payer: MEDICAID

## 2023-09-18 NOTE — TELEPHONE ENCOUNTER
Discharge from incision site  Watery - white in color  No redness  Above incision is swollen  No pain or tenderness  No fevers or chills  Incision drainage has odor.    Scheduled patient for incision check in clinic tomorrow.  Advise patient if she develops any fevers/chills or worsening symptoms overnight to report to emergency department.    Patient expressed understanding.    Olivia Tamez RN

## 2023-09-18 NOTE — TELEPHONE ENCOUNTER
Caller reporting the following red-flag symptom(s):  about 10 days ago.  Discharge, swollen and smells at incision for one day. No fever no blood in urine    Per the system red-flag symptom policy, patient was instructed to:  speak with a Registered Nurse    Action:  Patient warm transferred to a Registered Nurse

## 2023-09-19 ENCOUNTER — LACTATION ENCOUNTER (OUTPATIENT)
Age: 33
End: 2023-09-19

## 2023-09-19 ENCOUNTER — PRENATAL OFFICE VISIT (OUTPATIENT)
Dept: OBGYN | Facility: CLINIC | Age: 33
End: 2023-09-19
Payer: MEDICAID

## 2023-09-19 VITALS
BODY MASS INDEX: 38.05 KG/M2 | HEART RATE: 91 BPM | DIASTOLIC BLOOD PRESSURE: 66 MMHG | WEIGHT: 214.8 LBS | OXYGEN SATURATION: 99 % | SYSTOLIC BLOOD PRESSURE: 112 MMHG

## 2023-09-19 DIAGNOSIS — O09.90 AT HIGH RISK FOR POSTPARTUM COMPLICATIONS: Primary | ICD-10-CM

## 2023-09-19 PROCEDURE — 99024 POSTOP FOLLOW-UP VISIT: CPT

## 2023-09-19 PROCEDURE — 88307 TISSUE EXAM BY PATHOLOGIST: CPT | Mod: 26 | Performed by: PATHOLOGY

## 2023-09-19 NOTE — LACTATION NOTE
This note was copied from a baby's chart.  Lactation Follow Up Note    Reason for visit/ call:  Latch support    Supply:  16 mm nipple shield was provided, along with a storage container    Significant changes (medications, equipment, comfort, etc):  no    Skin to skin/ nuzzling/ latching:  Baby went skin-to-skin for this feeding. She first latched well without a nipple shield, but the rationale behind the use of a nipple shield was discussed and Malena stated that if it might help baby with feedings, she would like to try it. Baby latched well with the nipple shield on both sides.  The NICU RN had done a pre-feed weight. Baby transferred 8 mL of breast milk by breastfeeding on both sides. Malena stated that the latch felt comfortable with the shield in place.    Education given:  How to place the nipple shield was explained and then a demonstration was done on one breast. Mom was able to demonstrate placing the shield back on the other breast. Washing and care of a nipple shield was reviewed.    Plan:  Continue to practice with the nipple shield and see how baby does with breastfeeding.     Lizzy Flores, RN, IBCLC   Lactation Consultant  Ascom: [phone]  Office: [phone]

## 2023-09-20 NOTE — PROGRESS NOTES
CC: concern for discharge from incision  S: Malena is a 32 yo  who is s/p LTCS on 23 at 34.2 weeks for pree and failure to progress. Malena reports overall feeling well. Denies pain. Only notes lochia when pumping, reports it to be red and small. She is eating and drinking well. She denies constipation. She is currently staying with her baby in the NICU. She denies RUQ pain, edema, visual dx, SOB, or chest pain. Does have intermittent mild headache, reports to be checking bp regularly not having elevated pressures. Malena notes discharge yesterday from her incision that  was clear to pink/red, small to scant, and noted it to have an odor. She also reports scrubbing her skin and noting dirt comes off. She is concerned and would like to be evaluated for infection    O:  /66   Pulse 91   Wt 97.4 kg (214 lb 12.8 oz)   LMP 2023   SpO2 99%   Breastfeeding Yes   BMI 38.05 kg/m    Past Medical History:   Diagnosis Date    Known health problems: none      Past Surgical History:   Procedure Laterality Date     SECTION N/A 2023    Procedure:  section;  Surgeon: Karma Fields MD;  Location: [facility]    NO HISTORY OF SURGERY        No Known Allergies  Current Outpatient Medications   Medication    acetaminophen (TYLENOL) 325 MG tablet    famotidine (PEPCID) 20 MG tablet    ibuprofen (ADVIL/MOTRIN) 600 MG tablet    senna-docusate (SENOKOT-S/PERICOLACE) 8.6-50 MG tablet    hydrochlorothiazide (HYDRODIURIL) 12.5 MG tablet    NIFEdipine ER OSMOTIC (ADALAT CC) 60 MG 24 hr tablet    oxyCODONE (ROXICODONE) 5 MG tablet    polyethylene glycol (MIRALAX) 17 GM/Dose powder    Prenatal Vit-Fe Fumarate-FA (PRENATAL MULTIVITAMIN W/IRON) 27-0.8 MG tablet     No current facility-administered medications for this visit.     Alert pleasant female NAD  RRR no WOB or SOB  BP pulse WNL   Fundus not able to be palpated, no lochia seen on pad  Incision well approximated open to air. Steri strips have been  removed  No erythema, fluctuance or discharge noted  No odor appreciated  No fever abd pain or tenderness  No pain with palpation    A: Malena is a 34 yo  who is s/p LTCS on 23 at 34.2 weeks for pree and failure to progress.   P:  Discussed fever, pain, erythema, edema, purulent discharge signs of infection and she should return to care ASAP  -discussed scant serous to serosanguinous fluid is normal in healing   -recommend washing hands before touching incision  -avoid restrictive clothing and pants causing friction to the area of incision, was wearing tight compression pants  -recc showering with soap on torso and letting water run down, do not intentionally scrub or rub the incision.  -keep incision clean and dry  -do not put and ointments creams or lotions over the area of the incision   -bp good today, is taking medication and monitoring bp regularly, denies visual dx, edema, sob, does endorse mild irregular headache. Reports is sleeping, has tried tylenol and ibuprofen and caffeine. Is sleeping on parent bed/couch at daughters bedside in NICU.   Rtc if concerns with incision, high or low bp, worsening HA  1. At high risk for postpartum complications  Incision check    2. Hypertension in pregnancy, preeclampsia, delivered      AYDIN Phillips CNP

## 2023-09-26 ENCOUNTER — APPOINTMENT (OUTPATIENT)
Dept: INTERPRETER SERVICES | Facility: CLINIC | Age: 33
End: 2023-09-26
Payer: MEDICAID

## 2023-09-26 ENCOUNTER — TELEPHONE (OUTPATIENT)
Dept: OBGYN | Facility: CLINIC | Age: 33
End: 2023-09-26
Payer: MEDICAID

## 2023-09-26 NOTE — TELEPHONE ENCOUNTER
Patient rescheduled for 6 week pp.  Olivia Tamez, KAI Henriquez, Rayne VILLANUEVA MD  [facility] Triage  This patient is scheduled for postpartum visit on Thursday 9/28, but she is only 3 weeks postpartum.  Can you get more information about why she is scheduled and if this is for her pp visit, it needs to be at 6 weeks, and should be with Dr. Fields, her primary and also the surgeon for her delivery.  Thanks    RAYNE HENRIQUEZ MD

## 2023-10-19 ENCOUNTER — DOCUMENTATION ONLY (OUTPATIENT)
Dept: HOME HEALTH SERVICES | Facility: CLINIC | Age: 33
End: 2023-10-19

## 2023-10-19 DIAGNOSIS — O14.13 PREECLAMPSIA, SEVERE, THIRD TRIMESTER: Primary | ICD-10-CM

## 2024-03-05 ENCOUNTER — APPOINTMENT (OUTPATIENT)
Dept: INTERPRETER SERVICES | Facility: CLINIC | Age: 34
End: 2024-03-05
Payer: MEDICAID

## 2024-03-06 ENCOUNTER — APPOINTMENT (OUTPATIENT)
Dept: INTERPRETER SERVICES | Facility: CLINIC | Age: 34
End: 2024-03-06
Payer: MEDICAID

## 2024-07-28 ENCOUNTER — HEALTH MAINTENANCE LETTER (OUTPATIENT)
Age: 34
End: 2024-07-28

## 2025-01-20 PROBLEM — Z23 NEED FOR TDAP VACCINATION: Status: RESOLVED | Noted: 2023-02-17 | Resolved: 2025-01-20

## 2025-01-27 ENCOUNTER — MYC MEDICAL ADVICE (OUTPATIENT)
Dept: OBGYN | Facility: CLINIC | Age: 35
End: 2025-01-27
Payer: MEDICAID

## 2025-01-27 DIAGNOSIS — Z34.90 SUPERVISION OF NORMAL PREGNANCY: Primary | ICD-10-CM

## 2025-01-28 NOTE — TELEPHONE ENCOUNTER
Hi,    I am happy to see Malena and you can schedule her for a NOB visit with me on a call day in the first two weeks in February. Can an RN place the order for her OB ultrasound (looks like her LMP was 11/29/24 but that may not be certain). She can do her US  any time in the next 1-2 weeks with a RN phone visit to follow and then a NOB visit with me in the afternoon of 2/10 would work with my schedule, or we can add her to the end of a clinic day in the first two weeks of February at 4 pm.    Please also get her rescheduled for a RN OB intake, ideally before her NOB visit with me but if that can't happen, I will see her before the phone intake.    Thank you,  Karma Fields MD

## 2025-04-07 ENCOUNTER — TELEPHONE (OUTPATIENT)
Dept: OBGYN | Facility: CLINIC | Age: 35
End: 2025-04-07
Payer: MEDICAID

## 2025-04-07 DIAGNOSIS — O09.92 HIGH-RISK PREGNANCY IN SECOND TRIMESTER: ICD-10-CM

## 2025-04-07 DIAGNOSIS — Z34.82 ENCOUNTER FOR SUPERVISION OF OTHER NORMAL PREGNANCY IN SECOND TRIMESTER: Primary | ICD-10-CM

## 2025-04-07 NOTE — TELEPHONE ENCOUNTER
[facility] Call Center    Phone Message    May a detailed message be left on voicemail: yes     Reason for Call: Other: Pt was called several times from the clinic in January and February to schedule initial ob visits. She called back now and is requesting to schedule an ultrasound before doing the nurse intake. She also requests to see Dr. Fields, but soonest available is May 15th. Pt is currently 18w 3d. Please advise if pt can do ultrasound before nurse intake and on different day than provider visit and reach back out to her. She was told to expect a call back from the clinic.      Action Taken: Other: cornelio obgybhavik     Travel Screening: Not Applicable     Date of Service:

## 2025-04-09 NOTE — TELEPHONE ENCOUNTER
That is fine as long as pt knows I am on call and the appt may need to be rescheduled day of if I am busy in the hospital.  Her ultrasound should be changed to >14w size and dates, then will need a level 2 MFM US due to high risk history (h/o fetal demise at 25w, h/o pre-E with severe features and severe FGR with abnl dopplers at 34w).     1. Encounter for supervision of other normal pregnancy in second trimester (Primary)    - US OB > 14 Weeks; Future    2. High-risk pregnancy in second trimester    - Mat Fetal Med Ctr Referral - Pregnancy; Future    Thanks  Isuara Sutton MD

## 2025-04-09 NOTE — TELEPHONE ENCOUNTER
Hi Dr. Sutton,   This pt contacted us back in Jan for prenatal care, she never presented for phone intake or US and First Prenatal. Pt is now 18w5d and has phone intake with Miguelina at 12:00pm 4/10  and then is coming to [place] in the afternoon for US and First Provider visit. I added her to the end of your call day for the First Provider visit, if that is ok with you. If you are not able to do this appt. Please let me know and I will move her to a different day.     Thank you,   Kelley

## 2025-04-10 ENCOUNTER — TRANSCRIBE ORDERS (OUTPATIENT)
Dept: MATERNAL FETAL MEDICINE | Facility: CLINIC | Age: 35
End: 2025-04-10

## 2025-04-10 ENCOUNTER — ANCILLARY PROCEDURE (OUTPATIENT)
Dept: ULTRASOUND IMAGING | Facility: CLINIC | Age: 35
End: 2025-04-10
Attending: OBSTETRICS & GYNECOLOGY
Payer: MEDICAID

## 2025-04-10 ENCOUNTER — PRENATAL OFFICE VISIT (OUTPATIENT)
Dept: OBGYN | Facility: CLINIC | Age: 35
End: 2025-04-10
Payer: MEDICAID

## 2025-04-10 ENCOUNTER — VIRTUAL VISIT (OUTPATIENT)
Dept: OBGYN | Facility: CLINIC | Age: 35
End: 2025-04-10
Payer: MEDICAID

## 2025-04-10 VITALS
OXYGEN SATURATION: 100 % | HEART RATE: 86 BPM | SYSTOLIC BLOOD PRESSURE: 117 MMHG | DIASTOLIC BLOOD PRESSURE: 79 MMHG | WEIGHT: 209.1 LBS | BODY MASS INDEX: 37.04 KG/M2

## 2025-04-10 VITALS — BODY MASS INDEX: 38.05 KG/M2 | HEIGHT: 63 IN

## 2025-04-10 DIAGNOSIS — O09.299 HISTORY OF HELLP SYNDROME, CURRENTLY PREGNANT: ICD-10-CM

## 2025-04-10 DIAGNOSIS — O26.90 PREGNANCY RELATED CONDITION, ANTEPARTUM: Primary | ICD-10-CM

## 2025-04-10 DIAGNOSIS — O09.92 ENCOUNTER FOR SUPERVISION OF HIGH RISK PREGNANCY IN SECOND TRIMESTER, ANTEPARTUM: Primary | ICD-10-CM

## 2025-04-10 DIAGNOSIS — Z23 NEED FOR DIPHTHERIA-TETANUS-PERTUSSIS (TDAP) VACCINE: ICD-10-CM

## 2025-04-10 DIAGNOSIS — O34.219 HISTORY OF CESAREAN DELIVERY AFFECTING PREGNANCY: ICD-10-CM

## 2025-04-10 DIAGNOSIS — Z34.82 ENCOUNTER FOR SUPERVISION OF OTHER NORMAL PREGNANCY IN SECOND TRIMESTER: ICD-10-CM

## 2025-04-10 DIAGNOSIS — Z87.59 HISTORY OF IUFD: ICD-10-CM

## 2025-04-10 LAB
ALBUMIN UR-MCNC: NEGATIVE MG/DL
APPEARANCE UR: CLEAR
BILIRUB UR QL STRIP: NEGATIVE
COLOR UR AUTO: YELLOW
ERYTHROCYTE [DISTWIDTH] IN BLOOD BY AUTOMATED COUNT: 13.9 % (ref 10–15)
EST. AVERAGE GLUCOSE BLD GHB EST-MCNC: 94 MG/DL
GLUCOSE UR STRIP-MCNC: NEGATIVE MG/DL
HBA1C MFR BLD: 4.9 % (ref 0–5.6)
HCT VFR BLD AUTO: 34.3 % (ref 35–47)
HGB BLD-MCNC: 11.5 G/DL (ref 11.7–15.7)
HGB UR QL STRIP: NEGATIVE
KETONES UR STRIP-MCNC: ABNORMAL MG/DL
LEUKOCYTE ESTERASE UR QL STRIP: NEGATIVE
MCH RBC QN AUTO: 29.4 PG (ref 26.5–33)
MCHC RBC AUTO-ENTMCNC: 33.5 G/DL (ref 31.5–36.5)
MCV RBC AUTO: 88 FL (ref 78–100)
NITRATE UR QL: NEGATIVE
PH UR STRIP: 6 [PH] (ref 5–7)
PLATELET # BLD AUTO: 243 10E3/UL (ref 150–450)
RBC # BLD AUTO: 3.91 10E6/UL (ref 3.8–5.2)
SP GR UR STRIP: 1.02 (ref 1–1.03)
UROBILINOGEN UR STRIP-ACNC: 0.2 E.U./DL
WBC # BLD AUTO: 14.8 10E3/UL (ref 4–11)

## 2025-04-10 RX ORDER — PRENATAL VIT/IRON FUM/FOLIC AC 27MG-0.8MG
1 TABLET ORAL DAILY
Qty: 90 TABLET | Refills: 3 | Status: SHIPPED | OUTPATIENT
Start: 2025-04-10

## 2025-04-10 RX ORDER — ASPIRIN 81 MG/1
81 TABLET ORAL DAILY
Qty: 90 TABLET | Refills: 3 | Status: SHIPPED | OUTPATIENT
Start: 2025-04-10

## 2025-04-10 NOTE — PROGRESS NOTES
Important Information for Provider:     New ob nurse intake by phone, fifth pregnancy, late care. Recent C section /  2023 at 34 weeks and 2 days. History of preeclampsia/ HELLP. Patient has history of fetal demise at 25 weeks 2022.  SAB 2022. IAB 2018  Handouts reviewed. Fetal survey and NOB with Dr Sutton today  Hypertensive labs ordered with NOB labs   Patient complains of shortness of breath the last 3 days, denies any cold symptoms of allergies. Explained it is normal in pregnancy    Caffeine intake/servings daily - 1  Calcium intake/servings daily - 3  Exercise 5 times weekly - describe ; walks, precautions given  Sunscreen used - Yes  Seatbelts used - Yes  Self Breast Exam - Yes  Pap test up to date -  Yes  Dental exam up to date -  Yes  Immunizations reviewed and up to date - Yes  Abuse: Current or Past (Physical, Sexual or Emotional) - No  Do you feel safe in your environment - Yes  Do you cope well with stress - Yes      Prenatal OB Questionnaire  Patient supplied answers from flow sheet for:  Prenatal OB Questionnaire.  Past Medical History  Have you ever recieved care for your mental health? : No  Have you ever been in a major accident or suffered serious trauma?: No  Within the last year, has anyone hit, slapped, kicked or otherwise hurt you?: No  In the last year, has anyone forced you to have sex when you didn't want to?: No    Past Medical History 2   Have you ever received a blood transfusion?: No  Would you accept a blood transfusion if was medically recommended?: Yes  Does anyone in your home smoke?: No   Is your blood type Rh negative?: No  Have you ever ?: (!) Yes  Have you been hospitalized for a nonsurgical reason excluding normal delivery?: No  Have you ever had an abnormal pap smear?: No    Past Medical History (Continued)  Do you have a history of abnormalities of the uterus?: No  Did your mother take TALON or any other hormones when she was pregnant with  you?: No  Do you have any other problems we have not asked about which you feel may be important to this pregnancy?: No                     Allergies as of 4/10/2025:    Allergies as of 04/10/2025    (No Known Allergies)       Does patient have irregular periods?  No  Did patient use hormonal birth control in the three months prior to positive urine pregnancy test? No  Is the patient breastfeeding?  No  Is the patient 10 weeks or greater at time of education visit?  No

## 2025-04-10 NOTE — PATIENT INSTRUCTIONS
"Weeks 14 to 18 of Your Pregnancy: Care Instructions  Around this time, you may start to look pregnant. Your baby is now able to pass urine. And the first stool (meconium) is starting to collect in your baby's intestines. Hair is starting to grow on your baby's head.    You may notice some skin changes, such as itchy spots on your palms or acne on your face.   At your next doctor visit, you may have an ultrasound. So you might think about whether you want to know the sex of your baby. Also ask your doctor about flu and COVID-19 shots.      How to reduce stress   Ask for help when you need it.  Try to avoid things that cause you stress.  Seek out things that relieve stress, such as breathing exercises or yoga.     How to get exercise   If you don't usually exercise, start slowly. Short walks may be a good choice.  Try to be active 30 minutes a day, at least 5 days a week.  Avoid activities where you're more likely to fall.  Use light weights to reduce stress on your joints.     How to stay at a healthy weight for you   Talk to your doctor or midwife about how much weight you should gain.  It's generally best to gain:  About 28 to 40 pounds if you're underweight.  About 25 to 35 pounds if you're at a healthy weight.  About 15 to 25 pounds if you're overweight.  About 11 to 20 pounds if you're very overweight (obese).  Follow-up care is a key part of your treatment and safety. Be sure to make and go to all appointments, and call your doctor if you are having problems. It's also a good idea to know your test results and keep a list of the medicines you take.  Where can you learn more?  Go to https://www.[employer].net/patiented  Enter I453 in the search box to learn more about \"Weeks 14 to 18 of Your Pregnancy: Care Instructions.\"  Current as of: April 30, 2024  Content Version: 14.4    4040-8333 [employer].   Care instructions adapted under license by your healthcare professional. If you have questions about " a medical condition or this instruction, always ask your healthcare professional. [employer] disclaims any warranty or liability for your use of this information.    Second-Trimester Fetal Ultrasound: About This Test  What is it?     Fetal ultrasound is a test that uses sound waves to make pictures of your baby (fetus) and placenta inside the uterus. The test is the safest way to find out the age, size, and position of your baby. You also may be able to find out the sex of your baby. (But the test isn't done just to find out a baby's sex.)  No known risks to the mother or the baby are linked to fetal ultrasound. But you may feel anxious if the test reveals a problem with your pregnancy or baby.  Why is this test done?  In the second trimester, a fetal ultrasound is done to:  Estimate the number of weeks and days a fetus has developed since the beginning of the pregnancy. This is called the gestational age.  Look at the size and position of the fetus, the placenta, and the fluid that surrounds the fetus.  Find major birth defects, such as heart problems or problems with the brain and spinal cord (neural tube defects). But the test may not be able to find many minor defects and some major birth defects.  How do you prepare for the test?  In general, there's nothing you have to do before this test, unless your doctor tells you to.  How is the test done?  You may be able to leave your clothes on, or you will be given a gown to wear.  You will lie on your back on a padded examination table.  A gel will be spread on your belly. It will be removed after the test.  A small, handheld device called a transducer will be pressed against the gel on your skin and moved across your belly several times.  You may watch the monitor to see the picture of your baby during the test.  What happens after the test?  You will probably be able to go home right away.  You most likely will be able to go back to your usual  "activities right away.  Follow-up care is a key part of your treatment and safety. Be sure to make and go to all appointments, and call your doctor if you are having problems. It's also a good idea to keep a list of the medicines you take. Ask your doctor when you can expect to have your test results.  Where can you learn more?  Go to https://www.[employer]/patiented  Enter Y671 in the search box to learn more about \"Second-Trimester Fetal Ultrasound: About This Test.\"  Current as of: April 30, 2024  Content Version: 14.4    6051-6438 [employer].   Care instructions adapted under license by your healthcare professional. If you have questions about a medical condition or this instruction, always ask your healthcare professional. [employer] disclaims any warranty or liability for your use of this information.    During Pregnancy: Exercises  Introduction  Here are some examples of exercises to do during your pregnancy. Start each exercise slowly. Ease off the exercise if you start to have pain.  Talk to your doctor about when you can start these exercises and which ones will work best for you.  How to do the exercises  Neck rotation    Sit up straight in a firm chair, or stand up straight. If you're standing, keep your feet about hip-width apart.  Keeping your chin level, turn your head to the right and hold for 15 to 30 seconds.  Turn your head to the left and hold for 15 to 30 seconds.  Repeat 2 to 4 times.  Neck stretch to the front    Sit up straight in a firm chair, or stand up straight. Look straight ahead. If you're standing, keep your feet about hip-width apart.  Slowly bend your head forward without moving your shoulders.  Hold for 15 to 30 seconds, then return to your starting position.  Repeat 2 to 4 times.  Back press    Stand with your back 10 to 12 inches away from a wall.  Lean into the wall until your back is against it. Press your lower back against the wall by pulling in " your stomach muscles.  Slowly slide down until your knees are slightly bent, pressing your lower back against the wall.  Hold for at least 6 seconds, then slide back up the wall.  Repeat 8 to 12 times.  Over time, work up to holding this position for as much as 1 minute.  Trunk twist    Sit on the floor with your legs crossed. If that's not comfortable, you can sit on a folded blanket so your bottom is a few inches off the floor. Or you can sit on a chair with your knees hip-width apart and your feet flat on the floor.  Reach your left hand toward your right knee. You can place your right hand at your side for support.  Slowly twist your body (trunk) to your right.  Relax and return to your starting position.  Repeat 2 to 4 times.  Switch your hands and twist to your left.  Repeat 2 to 4 times.  Pelvic rocking on hands and knees    Start on your hands and knees. Place your wrists directly below your shoulders and your knees below your hips.  Breathe in slowly. Tuck your head downward and round your back up, making a curve with your back in the shape of the letter C. Hold this position for about 6 seconds.  Breathe out slowly and bring your head back up. Relax, keeping your back straight. (Don't allow it to curve toward the floor.) Hold for about 6 seconds.  Repeat 8 to 12 times, gently rocking your pelvis.  Pelvic tilt    Lie on your back with your knees bent and your feet flat on the floor.  Tighten your belly muscles by pulling your belly button in toward your spine. Press your lower back to the floor. You should feel your hips and pelvis rock back.  Hold for 6 seconds while breathing smoothly, and then relax.  Repeat 8 to 12 times.  Do this exercise only during the first 4 months of pregnancy. After this point, lying on your back is not recommended, because it can cause blood flow problems for you and your baby.  Backward stretch    Start on your hands and knees with your knees 8 to 10 inches apart, hands  directly below your shoulders, and arms and back straight.  Keeping your arms straight, slowly lower your buttocks toward your heels and tuck your head toward your knees. Hold for 15 to 30 seconds.  Slowly return to the starting position.  Repeat 2 to 4 times.  Forward bend    Sit comfortably in a chair, with your arms relaxed.  Slowly bend forward, allowing your arms to hang down. Lean only as far as you can without feeling discomfort or pressure on your belly.  Hold for 15 to 30 seconds and then slowly sit up straight.  Repeat 2 to 4 times or to your comfort level.  Donkey kick    Start on your hands and knees. Place your hands directly below your shoulders, and keep your arms straight.  Tighten your belly muscles by pulling your belly button in toward your spine. Keep breathing normally, and don't hold your breath.  Lift one knee and bring it toward your elbow.  Slowly extend that leg behind you without completely straightening it. Be careful not to let your hip drop down. Avoid arching your back.  Hold your leg behind you for about 6 seconds.  Return to your starting position.  Repeat 8 to 12 times for each leg.  Tailor sitting    Sit on the floor.  Bring your feet close to your body while crossing your ankles.  Keep your back straight. Relax your legs and let your knees drop toward the floor.  Hold this position for as long as you are comfortable.  Toe reach    Sit on the floor with your back straight, legs about 12 inches apart, and feet relaxed outward.  Stretch your hands forward toward your right foot, then sit up.  Stretch your hands straight forward, then sit up.  Stretch your hands forward toward your left foot, then sit up.  Hold each stretch for 15 to 30 seconds.  Repeat 2 to 4 times.  Follow-up care is a key part of your treatment and safety. Be sure to make and go to all appointments, and call your doctor if you are having problems. It's also a good idea to know your test results and keep a list of  the medicines you take.  Current as of: April 30, 2024  Content Version: 14.4    9597-5600 [employer].   Care instructions adapted under license by your healthcare professional. If you have questions about a medical condition or this instruction, always ask your healthcare professional. [employer] disclaims any warranty or liability for your use of this information.

## 2025-04-10 NOTE — PROGRESS NOTES
OB - New OB History and Physical  Date of visit: 4/10/2025  Chief Complaint: To establish prenatal care    HPI: Alia Thomas is a 34 year old  at 22w6d as dated by LMP consistent with today's ultrasound. Initially she told our intake RN that her LMP was  but told me that her last period was in Nov but it started on the first day of the month. Estimated Date of Delivery: Aug 8, 2025.  (h/o fetal demise at 25w, h/o pre-E with severe features and severe FGR with abnl dopplers at 34w).     Patient has not yet received prenatal care. She has questions about how to prevent pre-eclampsia    Since becoming pregnant, patient reports overall feeling good. No pain. No bleeding or abnl discharge. Feeling fetal movement. Reports 3 days of SOB. Worse at night. Right now breathing feels normal. No swelling or pain in legs. No cough or congestion or recent illness..      Pregnancy complicated by:  2022: H/o IUFD at 25w5d. Vaginal bleeding, placental abruption, pre-e with SF--> HELLP syndrome --> DIC, pulmonary edema, ICU admission.   2022:  SAB measuring ~15w on path exam.  Unknown if demised before/after delivery.  Delivered in toilet in ED.  Had not received prenatal care.  US in ED obtained after delivery of fetus and placenta.  Vitals were normal.  KB neg.  Placenta GBS pos and WBC 14.0   2023: H/o pre-eclampsia with severe features at 33w2d and severe FGR, h/o CS x1    Obstetric history:     OB History    Para Term  AB Living   5 2 0 2 2 1   SAB IAB Ectopic Multiple Live Births   0 0 0 0 1      # Outcome Date GA Lbr Bayron/2nd Weight Sex Type Anes PTL Lv   5 Current            4  23 34w2d   F CS-LTranv IV Y KAUSHIK      Complications: Dysfunctional Labor      Name: BAUDILIO,FEMALE-ALIA      Apgar1: 7  Apgar5: 9   3 AB 22 15w0d    SAB      2  22 25w5d 03:16 / 00:04 0.775 kg (1 lb 11.3 oz) M Vag-Spont IV N FD      Complications: Abruptio Placenta      Name:  GEORGE ASTUDILOL-ALIA FD      Apgar1: 0  Apgar5: 0   1 AB 2018 16w0d    IAB            Gynecologic History:   Patient's last menstrual period was 2024.   STI history: none  Last Pap: never had one      Allergy: Patient has no known allergies.  Patient denies food, latex or environmental allergies.     Current Medications:  No current outpatient medications on file.     No current facility-administered medications for this visit.       Past Medical History:  Past Medical History:   Diagnosis Date    Known health problems: none        Past Surgical History:  Past Surgical History:   Procedure Laterality Date     SECTION N/A 2023    Procedure:  section;  Surgeon: Karma Fields MD;  Location: [facility]       Social History:  Patient lives at home with her child. Not working.   No alcohol, no tobacco, no drug use.  Feels safe.     Family History:  Family History   Problem Relation Age of Onset    No Known Problems Mother     No Known Problems Father     No Known Problems Maternal Grandmother     No Known Problems Maternal Grandfather     No Known Problems Paternal Grandmother     No Known Problems Paternal Grandfather     No Known Problems Brother     No Known Problems Sister     No Known Problems Daughter        Review of Systems  Gen:  no change in weight, no fever, no chills, no fatigue  CV: no palpitations, no chest pain, no hypertension, no syncope  Resp: no shortness of breath, no cough, no wheezing, no asthma  GI: no nausea, no vomiting, no diarrhea, no constipation, no bloating, no GERD  :  no vaginal discharge, no dysuria, no abnormal bleeding, no pelvic pain   Endo: no thyroid problems, no cold/heat intolerance, no acne, no hirsutism, no diabetes  Heme: no easy bruising or bleeding, no history of DVT/PE/CVA  Neuro: no headaches, no seizures, no strokes, no focal deficits      Physical Exam:  Vitals:    04/10/25 1540   BP: 117/79   BP Location: Right arm   Patient Position: Sitting    Cuff Size: Adult Large   Pulse: 86   SpO2: 100%   Weight: 94.8 kg (209 lb 1.6 oz)     Body mass index is 37.04 kg/m .  Gen: alert, oriented, no distress, very pleasant, appears stated age, well groomed  Neck: supple, trachea midline, no thyromegaly, no lymphadenopathy  HEENT: head normocephalic, atraumatic, normal oropharynx without erythema or exudates  CV: normal heart sounds, regular rate and rhythm, no murmurs  Resp: good inspiratory effort, lungs clear to ascultation bilaterally, no wheezes or rhonchi  : deferred  Extr: warm, well perfused, nontender, no edema  Psych: affect bright, cooperative, responds appropriately      Assessment:  Malena Thomas is a 34 year old  at 22w6d presenting to establish prenatal care.    Problem List:   H/o pre-E with severe features, h/o HELLP  H/o IUFD at 25w  H/o CS x1  H/o severe FGR      Plan:  Baseline HELLP labs and UPC today in addition to prenatal labs. Recommend she start ASA 81mg daily.   Discussed that if she develops pre-E again, then delivery would be recommended by repeat CS.   Reviewed routine prenatal care. Discussed MD call schedule as well as role of residents and med students both in clinic and hospital.  She is okay  with resident care  Pap: has never had one. Declines today.    Fetal anomaly screening: fetal survey is incomplete. Recommend level 2 US given gestational age and high risk obstetric history   Routine Prenatal Care: the patient will return to clinic in 2 weeks and prn    Isaura Sutton MD

## 2025-04-10 NOTE — TELEPHONE ENCOUNTER
Patient is here for medicare wellness and pre-op for hiatal hernia in [place] 12/6/22.    No LMP recorded (lmp unknown). Patient has had a hysterectomy.  Medication Reconciliation: complete      Estefanía Hastings LPN 11/18/2022 10:17 AM       Advance care directive on file? no  Advance care directive provided to patient? no       Estefanía Hastings LPN     Pt is aware. Thank you.

## 2025-04-12 LAB — BACTERIA UR CULT: NORMAL

## 2025-04-29 ENCOUNTER — TELEPHONE (OUTPATIENT)
Dept: OBGYN | Facility: CLINIC | Age: 35
End: 2025-04-29
Payer: MEDICAID

## 2025-04-29 NOTE — TELEPHONE ENCOUNTER
[facility] Call Center    Phone Message    May a detailed message be left on voicemail: yes     Reason for Call: Appointment Intake    Referring Provider Name: Isaura Rolle   Diagnosis and/or Symptoms: OB follow up with US     Writer scheduled first available with [facility] US for same day. Writer didn't have any slots available after US time at 1:30pm.     Writer secured chatted and had delayed response on having the provider visit first and US after.     Please review and call pt to advise at # [phone].    Action Taken: Message routed to:  Other: RD OB     Travel Screening: Not Applicable

## 2025-04-29 NOTE — TELEPHONE ENCOUNTER
"Should be fine as she is getting a scan with MFM and seeing the MD there after and they will review her US with her. The prenatal appointment with us is kind of \"independent\" of those ones.     Thanks  Phyllis CHINO RN   [facility] Triage RN    "

## 2025-05-01 ENCOUNTER — PRE VISIT (OUTPATIENT)
Dept: MATERNAL FETAL MEDICINE | Facility: CLINIC | Age: 35
End: 2025-05-01
Payer: MEDICAID

## 2025-05-06 ENCOUNTER — OFFICE VISIT (OUTPATIENT)
Dept: MATERNAL FETAL MEDICINE | Facility: CLINIC | Age: 35
End: 2025-05-06
Attending: OBSTETRICS & GYNECOLOGY
Payer: MEDICAID

## 2025-05-06 ENCOUNTER — HOSPITAL ENCOUNTER (OUTPATIENT)
Dept: ULTRASOUND IMAGING | Facility: CLINIC | Age: 35
Discharge: HOME | End: 2025-05-06
Attending: OBSTETRICS & GYNECOLOGY
Payer: MEDICAID

## 2025-05-06 VITALS — DIASTOLIC BLOOD PRESSURE: 82 MMHG | SYSTOLIC BLOOD PRESSURE: 120 MMHG

## 2025-05-06 DIAGNOSIS — O26.90 PREGNANCY RELATED CONDITION, ANTEPARTUM: ICD-10-CM

## 2025-05-06 DIAGNOSIS — O36.5990 FETAL GROWTH RESTRICTION ANTEPARTUM: Primary | ICD-10-CM

## 2025-05-06 PROCEDURE — 59025 FETAL NON-STRESS TEST: CPT

## 2025-05-06 PROCEDURE — 76811 OB US DETAILED SNGL FETUS: CPT

## 2025-05-06 NOTE — H&P
[facility] Antepartum History and Physical     May 14, 2025  Alia Thomas  [id number]      HPI     Alia Thomas is a 34 year old  at 27w5d by LMP c/w 23w0d US who presents for prolonged monitoring from [facility] clinic due to non-reassuring NST in the setting of intermittently absent end diastolic flow on umbilical artery Doppler.    On presentation, reports feeling overall well. Denies bleeding/spotting, leakage of fluids, or contractions. Continues to feel active fetal movement. No concerns.    She has a complicated obstetrics history including a history of preeclampsia with severe features and history of HELLP syndrome, as well as a history of an IUFD at 25w0d in the setting of a placental abruption, severe FGR in a subsequent pregnancy, and a prior CS.     ROS: No headaches, vision changes, nausea, vomiting, fevers, chills, chest pain, SOB,  abdominal pain, constipation, diarrhea, dysuria, changes in vaginal discharge or edema in extremities noted.     Her pregnancy has been complicated by:  - Fetal IAEDF, nonreactive NST     - Hx preeclampsia w/ severe features   - Hx HELLP syndrome   - Hx IUFD 25w0d   - Hx severe FGR   - Hx prior CS x1     OB History    Para Term  AB Living   5 2 0 2 2 1   SAB IAB Ectopic Multiple Live Births   0 0 0 0 1      # Outcome Date GA Lbr Bayron/2nd Weight Sex Type Anes PTL Lv   5 Current            4  23 34w2d   F CS-LTranv IV Y KAUSHIK      Complications: Dysfunctional Labor      Name: BAUDILIOFEMALE-ALIA      Apgar1: 7  Apgar5: 9   3 AB 22 15w0d    SAB      2  22 25w5d 03:16 / 00:04 0.775 kg (1 lb 11.3 oz) M Vag-Spont IV N FD      Complications: Abruptio Placenta      Name: BAUDILIOMALE-ALIA FD      Apgar1: 0  Apgar5: 0   1 AB  16w0d    IAB          Past Medical History     Past Medical History:   Diagnosis Date    Known health problems: none        Past Surgical History     Past Surgical History:   Procedure Laterality Date      SECTION N/A 2023    Procedure:  section;  Surgeon: Karma Fields MD;  Location:  L+D       Medications     Current Facility-Administered Medications   Medication Dose Route Frequency Provider Last Rate Last Admin    acetaminophen (TYLENOL) tablet 650 mg  650 mg Oral Q4H PRN Whitney Wilson MD        calcium carbonate (TUMS) chewable tablet 1,000 mg  1,000 mg Oral Once PRN Whitney Wilson MD        hydrOXYzine HCl (ATARAX) tablet 50 mg  50 mg Oral Q6H PRN Whitney Wilson MD        metoclopramide (REGLAN) tablet 10 mg  10 mg Oral Q6H PRN Whitney Wilson MD        Or    metoclopramide (REGLAN) injection 10 mg  10 mg Intravenous Q6H PRN Whitney Wilson MD        ondansetron (ZOFRAN ODT) ODT tab 4 mg  4 mg Oral Q6H PRN Whitney Wilson MD        Or    ondansetron (ZOFRAN) injection 4 mg  4 mg Intravenous Q6H PRN Whitney Wilson MD        prochlorperazine (COMPAZINE) tablet 10 mg  10 mg Oral Q6H PRN Whitney Wilson MD        Or    prochlorperazine (COMPAZINE) injection 10 mg  10 mg Intravenous Q6H PRN Whitney Wilson MD           Allergies   No Known Allergies    Family History     Family History   Problem Relation Age of Onset    No Known Problems Mother     No Known Problems Father     No Known Problems Maternal Grandmother     No Known Problems Maternal Grandfather     No Known Problems Paternal Grandmother     No Known Problems Paternal Grandfather     No Known Problems Brother     No Known Problems Sister     No Known Problems Daughter        Social History     Social History     Socioeconomic History    Marital status: Single   Tobacco Use    Smoking status: Never    Smokeless tobacco: Never   Vaping Use    Vaping status: Never Used   Substance and Sexual Activity    Alcohol use: Not Currently    Drug use: Never    Sexual activity: Yes     Partners: Male       ROS   10-point ROS negative except as indicated in HPI.    Physical Exam     Vitals:    25 1632   BP: 121/76   BP  Location: Left arm   Patient Position: Semi-Cheney's   Cuff Size: Adult Large   Resp: 16   Temp: 98.3  F (36.8  C)   TempSrc: Oral   General: alert, oriented female, resting in bed in NAD  CV: regular rate and rhythm  Lungs: NWOB on room air  Abdomen: soft, gravid, non-tender  Extremities: bilateral lower extremities non-tender with 1+ pitting edema  : deferred  Fetal monitoring on the antepartum unit   FHT: baseline 140, periods of minimal with overall moderate variability, 10x10 accelerations present;  no decelerations while on unit for over 1 hour  [place]: quiet     Labs     Lab Results   Component Value Date    AS Negative 04/10/2025    HEPBANG Nonreactive 04/10/2025    CHPCRT Negative 2023    GCPCRT Negative 2023    HGB 11.5 (L) 04/10/2025     Results for orders placed or performed during the hospital encounter of 25 (from the past 24 hours)   Maternal Fetal US OB Limited Single/Multiple    Narrative            Limited  ---------------------------------------------------------------------------------------------------------  Pat. Name: ALIA ASTUDILLO       Study Date:  2025 3:34pm  Pat. NO:  [id number]        Referring  MD: SUNNI BROWNLEE  Site:         Sonographer: Terra Hinton RDMS  :  1990        Age:   34  ---------------------------------------------------------------------------------------------------------    INDICATION  ---------------------------------------------------------------------------------------------------------  FGR with intermittent absent EDF  History IUFD (25w, placental abruption, severe pre-eclampsia/HELLP)  History of Severe Preeclampsia and FGR with PTD (34 weeks)  Multiple 2nd trimester pregnancy losses  Late prenatal care (dated 23w sono)      METHOD  ---------------------------------------------------------------------------------------------------------  Transabdominal ultrasound examination. View:  Sufficient      PREGNANCY  ---------------------------------------------------------------------------------------------------------  Garner pregnancy. Number of fetuses: 1      DATING  ---------------------------------------------------------------------------------------------------------                                           Date                                Details                                                                                      Gest. age                      MANUEL  LMP                                  11/1/2024                                                                                                                         27 w + 5 d                     8/8/2025  Previous U/S                      4/10/2025                        GA, GA 23 w + 0 d                                                                      27 w + 6 d                     8/7/2025  Assigned dating                  based on the LMP, selected on 05/6/2025                                                                           27 w + 5 d                     8/8/2025      GENERAL EVALUATION  ---------------------------------------------------------------------------------------------------------  Cardiac activity present.  bpm. Fetal movements: visualized. Presentation: breech  Placenta: Anterior, anterior, left, No Previa, > 2 cm from internal os  Umbilical cord: previously studied  Amniotic fluid: normal MVP, MVP 6.0 cm      FETAL DOPPLER  ---------------------------------------------------------------------------------------------------------  Umbilical Artery: abnormal, Elevated PI but there was no evidence of absent or reversed end diastolic flow. Sampling site: midcord  PI                                                             1.56                                                  >99%                           Tiny  HR                                                           138                      bpm      MATERNAL STRUCTURES  ---------------------------------------------------------------------------------------------------------  Right Ovary                          Not examined  Left Ovary                            Not examined      NON STRESS TEST  ---------------------------------------------------------------------------------------------------------  NST interpretation: non-reactive. Test duration 20 min. Baseline  bpm. Baseline variability: moderate. Accelerations: absent. Decelerations: present, 2 minute  spontaneous deceleration. Uterine activity: absent      RECOMMENDATION  ---------------------------------------------------------------------------------------------------------  Thank-you for referring your patient for  surveillance in the setting of fetal growth restriction.    We discussed the findings on today's ultrasound with the patient. The umbilical artery dopplers demonstrated absent end diastolic flow on her last ultrasound. Today UA  Dopplers are just elevated but given the nonreactive NST with a spontaneous deceleration observation for prolonged monitoring is recommended. Discussed this with Malena  and she is amenable. Warm hand-off given to inpatient MFM team inpatient.    Return to primary provider for continued prenatal care.    If you have questions regarding today's evaluation or if we can be of further service, please contact the Maternal[facility]Fetal Medicine Center.    **Fetal anomalies may be present but not detected**    I spent a total of 10 minutes (excluding the ultrasound interpretation) on the date of this encounter including preparing to see the patient (reviewing medical records/tests),  in direct face-to-face contact with the patient during the visit counseling and discussing the plan of care and documenting the visit in the electronic medical record.        Impression     IMPRESSION  ---------------------------------------------------------------------------------------------------------  1. Garner pregnancy at 27w 5d with fetal growth restriction (EFW 6th% on ).  2. The amniotic fluid volume appeared normal.  3. The umbilical artery Dopplers were abnormal: Elevated PI but there was no evidence of absent or reversed end diastolic flow.  4. The fetal heart rate monitoring was nonreactive with a spontaneous deceleration.            Imaging     See imaging tab for today's [facility] US, final result pending.     Assessment/Plan     34 year old  at 27w5d by LMP c/w 23w0d US, here for prolonged monitoring after non-reassuring NST in clinic, in setting of FGR with intermittent absent end diastolic flow, most recently elevated PI on UADs however no absent or reversed flow. Prolonged monitoring with overall moderate variability and 10x10 accelerations. Reviewed fetal heart tracing with patient, recommended extended monitoring with which she was amenable. Discussed in depth the pathophysiology of abnormal umbilical artery dopplers and my concern for uteroplacental insufficiency, particularly in setting of FGR. Discussed that if spontaneous decelerations were to reoccur, may recommend admitting overnight for monitoring and likely administration of betamethasone for fetal lung maturity, which she would consider if recommended. Patient and her  were given ample time to ask questions, which were answered.     [facility] to conclusion of prolonged monitoring.      Patient seen and care plan discussed under supervision of Dr. Torres.    Whitney Wilson MD  Obstetrics & Gynecology, PGY-3  2025 9:41 PM        Physician Attestation   I saw this patient with the resident and agree with the resident/fellow's findings and plan of care as documented in the note.  I have reviewed patient's vitals, labs,imaging and fetal heart tones and toco.    Alba findings: Malena Thomas is 27w5d presented for monitoring after decel noted on NST in [facility] clinic today.  On labor and delivery fetal monitoring is reassuring with accels appropriate for 27w5d. There are no decels noted. The NST is reactive.   Patient desired to discharge home.  She will follow-up in [facility] clinic in less than 48 hours for her next clinic appt.  In the mean time she will come back sooner with decreased FM, bleeding or pain.    Will continue with planned  testing.     I spent 45 minutes with patient today at the bedside, speaking to [facility] staff and doing chart review and follow-up today.     Please see A&P for additional details of medical decision making.      Lauren Torres MD  Date of Service (when I saw the patient):May 14, 2025

## 2025-05-06 NOTE — NURSING NOTE
Malena Thomas is here for ultrasound today. Denies questions or concerns today. Patient reports positive fetal movement, denies contractions, leaking of fluid, or bleeding.  Reports blood pressures at home have been wnl. Patient denies signs and symptoms of preeclampsia today - denies vision changes, epigastric pain, increased swelling or nausea/vomiting. Education provided to patient on today's ultrasound.  SBAR given to KRISH PETERS, see their note in Epic.  NST Performed due to FGR.   reviewed efm tracing. See NST/BPP Doc Flowsheet tab.

## 2025-05-07 ENCOUNTER — HOSPITAL ENCOUNTER (OUTPATIENT)
Dept: ULTRASOUND IMAGING | Facility: CLINIC | Age: 35
Discharge: HOME | End: 2025-05-07
Attending: STUDENT IN AN ORGANIZED HEALTH CARE EDUCATION/TRAINING PROGRAM
Payer: MEDICAID

## 2025-05-07 ENCOUNTER — OFFICE VISIT (OUTPATIENT)
Dept: MATERNAL FETAL MEDICINE | Facility: CLINIC | Age: 35
End: 2025-05-07
Attending: STUDENT IN AN ORGANIZED HEALTH CARE EDUCATION/TRAINING PROGRAM
Payer: MEDICAID

## 2025-05-07 VITALS — SYSTOLIC BLOOD PRESSURE: 108 MMHG | OXYGEN SATURATION: 99 % | DIASTOLIC BLOOD PRESSURE: 75 MMHG | HEART RATE: 74 BPM

## 2025-05-07 DIAGNOSIS — O09.292 HISTORY OF STILLBIRTH IN CURRENTLY PREGNANT PATIENT, SECOND TRIMESTER: ICD-10-CM

## 2025-05-07 DIAGNOSIS — O36.5990 FETAL GROWTH RESTRICTION ANTEPARTUM: Primary | ICD-10-CM

## 2025-05-07 DIAGNOSIS — Z87.59 HISTORY OF PLACENTA ABRUPTION: ICD-10-CM

## 2025-05-07 DIAGNOSIS — O26.92 PREGNANCY RELATED CONDITION IN SECOND TRIMESTER: ICD-10-CM

## 2025-05-07 DIAGNOSIS — O36.5990 FETAL GROWTH RESTRICTION ANTEPARTUM: ICD-10-CM

## 2025-05-07 DIAGNOSIS — O09.292: ICD-10-CM

## 2025-05-07 DIAGNOSIS — O09.299 HISTORY OF PRE-ECLAMPSIA IN PRIOR PREGNANCY, CURRENTLY PREGNANT: ICD-10-CM

## 2025-05-07 PROCEDURE — 76815 OB US LIMITED FETUS(S): CPT

## 2025-05-07 PROCEDURE — 59025 FETAL NON-STRESS TEST: CPT

## 2025-05-07 NOTE — NURSING NOTE
Patient presents to [facility] for NST/limited/UAR at 26w5d due to FGR, intermittent AEDF. Positive fetal movement. Denies LOF, vaginal bleeding or cramping/contractions. SBAR given to M MD, see their note in Epic.

## 2025-05-07 NOTE — PROGRESS NOTES
The patient was seen for an ultrasound in the [facility] today.  For a detailed report of the ultrasound examination, please see the ultrasound report which can be found under the imaging tab.    If you have questions regarding today's evaluation or if we can be of further service, please contact the [facility]etal Medicine Center.    Saira Paniagua MD  , OB/GYN  Maternal-Fetal Medicine

## 2025-05-08 ENCOUNTER — APPOINTMENT (OUTPATIENT)
Dept: INTERPRETER SERVICES | Facility: CLINIC | Age: 35
End: 2025-05-08
Payer: COMMERCIAL

## 2025-05-08 ENCOUNTER — RESULTS FOLLOW-UP (OUTPATIENT)
Dept: OBGYN | Facility: CLINIC | Age: 35
End: 2025-05-08

## 2025-05-09 ENCOUNTER — HOSPITAL ENCOUNTER (OUTPATIENT)
Dept: ULTRASOUND IMAGING | Facility: CLINIC | Age: 35
Discharge: HOME | End: 2025-05-09
Attending: STUDENT IN AN ORGANIZED HEALTH CARE EDUCATION/TRAINING PROGRAM
Payer: MEDICAID

## 2025-05-09 DIAGNOSIS — O36.5990 FETAL GROWTH RESTRICTION ANTEPARTUM: ICD-10-CM

## 2025-05-09 PROCEDURE — 76820 UMBILICAL ARTERY ECHO: CPT

## 2025-05-12 ENCOUNTER — RESULTS FOLLOW-UP (OUTPATIENT)
Dept: OBGYN | Facility: CLINIC | Age: 35
End: 2025-05-12

## 2025-05-12 ENCOUNTER — HOSPITAL ENCOUNTER (OUTPATIENT)
Dept: ULTRASOUND IMAGING | Facility: CLINIC | Age: 35
Discharge: HOME | End: 2025-05-12
Attending: OBSTETRICS & GYNECOLOGY
Payer: MEDICAID

## 2025-05-12 ENCOUNTER — OFFICE VISIT (OUTPATIENT)
Dept: MATERNAL FETAL MEDICINE | Facility: CLINIC | Age: 35
End: 2025-05-12
Attending: OBSTETRICS & GYNECOLOGY
Payer: MEDICAID

## 2025-05-12 ENCOUNTER — PRENATAL OFFICE VISIT (OUTPATIENT)
Dept: OBGYN | Facility: CLINIC | Age: 35
End: 2025-05-12
Payer: MEDICAID

## 2025-05-12 VITALS
SYSTOLIC BLOOD PRESSURE: 116 MMHG | DIASTOLIC BLOOD PRESSURE: 80 MMHG | OXYGEN SATURATION: 100 % | WEIGHT: 214 LBS | HEART RATE: 72 BPM | BODY MASS INDEX: 37.91 KG/M2

## 2025-05-12 VITALS — SYSTOLIC BLOOD PRESSURE: 118 MMHG | DIASTOLIC BLOOD PRESSURE: 74 MMHG

## 2025-05-12 DIAGNOSIS — O09.299 HISTORY OF PRE-ECLAMPSIA IN PRIOR PREGNANCY, CURRENTLY PREGNANT: ICD-10-CM

## 2025-05-12 DIAGNOSIS — O36.5990 FETAL GROWTH RESTRICTION ANTEPARTUM: ICD-10-CM

## 2025-05-12 DIAGNOSIS — O34.219 HISTORY OF CESAREAN DELIVERY, CURRENTLY PREGNANT: ICD-10-CM

## 2025-05-12 DIAGNOSIS — O36.5921 POOR FETAL GROWTH AFFECTING MANAGEMENT OF MOTHER IN SECOND TRIMESTER, FETUS 1 OF MULTIPLE GESTATION: ICD-10-CM

## 2025-05-12 DIAGNOSIS — O09.92 ENCOUNTER FOR SUPERVISION OF HIGH RISK PREGNANCY IN SECOND TRIMESTER, ANTEPARTUM: Primary | ICD-10-CM

## 2025-05-12 DIAGNOSIS — O36.5990 FETAL GROWTH RESTRICTION ANTEPARTUM: Primary | ICD-10-CM

## 2025-05-12 DIAGNOSIS — O09.292 HISTORY OF STILLBIRTH IN CURRENTLY PREGNANT PATIENT, SECOND TRIMESTER: ICD-10-CM

## 2025-05-12 PROCEDURE — 0502F SUBSEQUENT PRENATAL CARE: CPT | Performed by: OBSTETRICS & GYNECOLOGY

## 2025-05-12 PROCEDURE — 59025 FETAL NON-STRESS TEST: CPT

## 2025-05-12 PROCEDURE — 3079F DIAST BP 80-89 MM HG: CPT | Performed by: OBSTETRICS & GYNECOLOGY

## 2025-05-12 PROCEDURE — 99207 PR PRENATAL VISIT: CPT | Performed by: OBSTETRICS & GYNECOLOGY

## 2025-05-12 PROCEDURE — 76820 UMBILICAL ARTERY ECHO: CPT

## 2025-05-12 PROCEDURE — 3074F SYST BP LT 130 MM HG: CPT | Performed by: OBSTETRICS & GYNECOLOGY

## 2025-05-12 NOTE — NURSING NOTE
Patient reports positive fetal movement, no pain, no contractions, leaking of fluid, or bleeding.  Education provided to patient on today's ultrasound.  SBAR given to KRISH PETERS, see their note in Epic.

## 2025-05-12 NOTE — PROGRESS NOTES
27w3d  Active fetal movement.   2d of cramping last week, nothing this week.  No bleeding or abnl discharge.    Discussed recent diagnosis of intrauterine fetal growth restriction and abnormal dopplers - Elevated PI and Intermittent Absent End Diastolic Flow  (5/6). At that time inpt management and monitoring was recommended but pt declined so plan was made to do 3x per week dopplers and q3w US. US 5/7 showed elevated PI but no absent or reverse endo diastolic flow. US 5/9 showed absent end diastolic flow.     US today -   1) Garner intrauterine pregnancy at 27w 3d gestational age.  2) The NST is reactive.  3) The amniotic fluid volume appeared normal.  4) The UA Doppler is abnormal with intermittent AEDF    Discussed concern for increased risk of stillbirth. Reviewed if decreased fetal movement or any other concern I recommend she go to the hospital for monitoring. She lives alone with her 18mo old daughter. States she has a male cousin who lives in town who drives her to appts and could drive her to the hospital if needed. Her  is not here and her parents are in [place]. Discussed plan that MFM made with her - if reverse end diastolic flow we will again recommend inpatient management and steroid administration. Pt expresses understanding. She has not yet done 1h gtt, recommend she make appt for that. Recommend she be seen in clinic weekly, can coordinate with her MFM US. Discussed likely  section given IUGR and high likelihood for  delivery in setting of IUGR and abnl dopplers.     RTC weekly  Isaura Sutton MD

## 2025-05-14 ENCOUNTER — RESULTS FOLLOW-UP (OUTPATIENT)
Dept: OBGYN | Facility: CLINIC | Age: 35
End: 2025-05-14

## 2025-05-14 ENCOUNTER — HOSPITAL ENCOUNTER (OUTPATIENT)
Dept: ULTRASOUND IMAGING | Facility: CLINIC | Age: 35
Discharge: HOME | End: 2025-05-14
Attending: OBSTETRICS & GYNECOLOGY
Payer: COMMERCIAL

## 2025-05-14 ENCOUNTER — HOSPITAL ENCOUNTER (OUTPATIENT)
Facility: CLINIC | Age: 35
End: 2025-05-14
Admitting: OBSTETRICS & GYNECOLOGY
Payer: COMMERCIAL

## 2025-05-14 ENCOUNTER — OFFICE VISIT (OUTPATIENT)
Dept: MATERNAL FETAL MEDICINE | Facility: CLINIC | Age: 35
End: 2025-05-14
Attending: OBSTETRICS & GYNECOLOGY
Payer: COMMERCIAL

## 2025-05-14 ENCOUNTER — HOSPITAL ENCOUNTER (OUTPATIENT)
Facility: CLINIC | Age: 35
Discharge: HOME | End: 2025-05-14
Attending: OBSTETRICS & GYNECOLOGY | Admitting: OBSTETRICS & GYNECOLOGY
Payer: COMMERCIAL

## 2025-05-14 VITALS — TEMPERATURE: 98.3 F | RESPIRATION RATE: 16 BRPM | DIASTOLIC BLOOD PRESSURE: 76 MMHG | SYSTOLIC BLOOD PRESSURE: 121 MMHG

## 2025-05-14 DIAGNOSIS — O36.5990 FETAL GROWTH RESTRICTION ANTEPARTUM: Primary | ICD-10-CM

## 2025-05-14 DIAGNOSIS — O09.292 HISTORY OF STILLBIRTH IN CURRENTLY PREGNANT PATIENT, SECOND TRIMESTER: ICD-10-CM

## 2025-05-14 DIAGNOSIS — O36.5990 FETAL GROWTH RESTRICTION ANTEPARTUM: ICD-10-CM

## 2025-05-14 PROBLEM — O36.8390 ANTEPARTUM NON-REASSURING FETAL HEART RATE OR RHYTHM AFFECTING CARE OF MOTHER: Status: ACTIVE | Noted: 2025-05-14

## 2025-05-14 PROCEDURE — 59025 FETAL NON-STRESS TEST: CPT | Mod: 26 | Performed by: OBSTETRICS & GYNECOLOGY

## 2025-05-14 PROCEDURE — G0463 HOSPITAL OUTPT CLINIC VISIT: HCPCS | Mod: 25 | Performed by: OBSTETRICS & GYNECOLOGY

## 2025-05-14 PROCEDURE — 99215 OFFICE O/P EST HI 40 MIN: CPT | Mod: 25 | Performed by: OBSTETRICS & GYNECOLOGY

## 2025-05-14 PROCEDURE — 76820 UMBILICAL ARTERY ECHO: CPT

## 2025-05-14 PROCEDURE — 59025 FETAL NON-STRESS TEST: CPT

## 2025-05-14 RX ORDER — ACETAMINOPHEN 325 MG/1
650 TABLET ORAL EVERY 4 HOURS PRN
Status: DISCONTINUED | OUTPATIENT
Start: 2025-05-14 | End: 2025-05-14 | Stop reason: HOSPADM

## 2025-05-14 RX ORDER — HYDROXYZINE HYDROCHLORIDE 50 MG/1
50 TABLET, FILM COATED ORAL EVERY 6 HOURS PRN
Status: DISCONTINUED | OUTPATIENT
Start: 2025-05-14 | End: 2025-05-14 | Stop reason: HOSPADM

## 2025-05-14 RX ORDER — ONDANSETRON 4 MG/1
4 TABLET, ORALLY DISINTEGRATING ORAL EVERY 6 HOURS PRN
Status: DISCONTINUED | OUTPATIENT
Start: 2025-05-14 | End: 2025-05-14 | Stop reason: HOSPADM

## 2025-05-14 RX ORDER — METOCLOPRAMIDE 10 MG/1
10 TABLET ORAL EVERY 6 HOURS PRN
Status: DISCONTINUED | OUTPATIENT
Start: 2025-05-14 | End: 2025-05-14 | Stop reason: HOSPADM

## 2025-05-14 RX ORDER — ONDANSETRON 2 MG/ML
4 INJECTION INTRAMUSCULAR; INTRAVENOUS EVERY 6 HOURS PRN
Status: DISCONTINUED | OUTPATIENT
Start: 2025-05-14 | End: 2025-05-14 | Stop reason: HOSPADM

## 2025-05-14 RX ORDER — CALCIUM CARBONATE 500 MG/1
1000 TABLET, CHEWABLE ORAL
Status: DISCONTINUED | OUTPATIENT
Start: 2025-05-14 | End: 2025-05-14 | Stop reason: HOSPADM

## 2025-05-14 RX ORDER — PROCHLORPERAZINE MALEATE 10 MG
10 TABLET ORAL EVERY 6 HOURS PRN
Status: DISCONTINUED | OUTPATIENT
Start: 2025-05-14 | End: 2025-05-14 | Stop reason: HOSPADM

## 2025-05-14 RX ORDER — METOCLOPRAMIDE HYDROCHLORIDE 5 MG/ML
10 INJECTION INTRAMUSCULAR; INTRAVENOUS EVERY 6 HOURS PRN
Status: DISCONTINUED | OUTPATIENT
Start: 2025-05-14 | End: 2025-05-14 | Stop reason: HOSPADM

## 2025-05-14 ASSESSMENT — ACTIVITIES OF DAILY LIVING (ADL)
ADLS_ACUITY_SCORE: 32

## 2025-05-14 NOTE — PROVIDER NOTIFICATION
25 1635   Provider Notification   Provider Name/Title Dr. Wilson   Method of Notification In Department   Request Evaluate in Person   Notification Reason Patient Arrived     Provider notified of  27.5 patient who presented from MFM clinic for extended monitoring. EFM monitor applied with consent. Pt denies LOF, vaginal bleeding or contractions.

## 2025-05-14 NOTE — PROGRESS NOTES
The patient was seen for an ultrasound in the [facility]Fetal Medicine Center at the [facility] today.  For a detailed report of the ultrasound examination, please see the ultrasound report which can be found under the imaging tab.    If you have questions regarding today's evaluation or if we can be of further service, please contact the Maternal[facility]Fetal Medicine Center.    Eleanor Avendano MD  , OB/GYN  Maternal-Fetal Medicine  [phone] (Pager)

## 2025-05-14 NOTE — NURSING NOTE
Patient reports positive fetal movement, no pain, no contractions, leaking of fluid, or bleeding.  Patient denies headache, visual changes, nausea/vomiting, epigastric pain related to preeclampsia.  Education provided to patient on today's ultrasound.  SBAR given to MFDAXA PETERS, see their note in Epic.  NST Performed due to FGR.  Dr. Avendano reviewed efm tracing. See NST/BPP Doc Flowsheet tab. Recommendation per Dr. Avendano if for patient to report to labor and delivery for extended monitoring. Patient agrees and walked to L&D. Charge RN aware and Dr. Dinero.

## 2025-05-15 NOTE — PLAN OF CARE
Extended monitoring reassuring, provider okay with pt discharging home. Prior to discharge pt educated on when to present to the hospital, pt verbalized understanding. Discharged home at 1945

## 2025-05-19 NOTE — TELEPHONE ENCOUNTER
RACHEL via [facility]  to call and schedule REBA following her MFM visit per SL

## 2025-05-20 ENCOUNTER — HOSPITAL ENCOUNTER (INPATIENT)
Facility: CLINIC | Age: 35
End: 2025-05-20
Attending: OBSTETRICS & GYNECOLOGY | Admitting: OBSTETRICS & GYNECOLOGY
Payer: COMMERCIAL

## 2025-05-20 ENCOUNTER — HOSPITAL ENCOUNTER (OUTPATIENT)
Dept: ULTRASOUND IMAGING | Facility: CLINIC | Age: 35
Discharge: HOME | End: 2025-05-20
Attending: OBSTETRICS & GYNECOLOGY
Payer: COMMERCIAL

## 2025-05-20 ENCOUNTER — OFFICE VISIT (OUTPATIENT)
Dept: MATERNAL FETAL MEDICINE | Facility: CLINIC | Age: 35
End: 2025-05-20
Attending: OBSTETRICS & GYNECOLOGY
Payer: COMMERCIAL

## 2025-05-20 VITALS — HEART RATE: 89 BPM | DIASTOLIC BLOOD PRESSURE: 58 MMHG | SYSTOLIC BLOOD PRESSURE: 114 MMHG

## 2025-05-20 DIAGNOSIS — O36.5990 FETAL GROWTH RESTRICTION ANTEPARTUM: Primary | ICD-10-CM

## 2025-05-20 DIAGNOSIS — G89.18 ACUTE POST-OPERATIVE PAIN: ICD-10-CM

## 2025-05-20 DIAGNOSIS — O36.5990 FETAL GROWTH RESTRICTION ANTEPARTUM: ICD-10-CM

## 2025-05-20 DIAGNOSIS — O09.293: ICD-10-CM

## 2025-05-20 DIAGNOSIS — Z98.891 S/P CESAREAN SECTION: ICD-10-CM

## 2025-05-20 DIAGNOSIS — O13.9 GESTATIONAL HYPERTENSION, ANTEPARTUM: ICD-10-CM

## 2025-05-20 DIAGNOSIS — O09.299 HISTORY OF PRE-ECLAMPSIA IN PRIOR PREGNANCY, CURRENTLY PREGNANT: ICD-10-CM

## 2025-05-20 DIAGNOSIS — O99.210 OBESITY DURING PREGNANCY: ICD-10-CM

## 2025-05-20 DIAGNOSIS — O26.93 PREGNANCY RELATED CONDITION IN THIRD TRIMESTER: ICD-10-CM

## 2025-05-20 PROBLEM — O36.8390 NON-REASSURING ELECTRONIC FETAL MONITORING TRACING: Status: ACTIVE | Noted: 2025-05-20

## 2025-05-20 LAB
ABO + RH BLD: NORMAL
ALBUMIN MFR UR ELPH: <6 MG/DL
ALBUMIN SERPL BCG-MCNC: 3.1 G/DL (ref 3.5–5.2)
ALP SERPL-CCNC: 134 U/L (ref 40–150)
ALT SERPL W P-5'-P-CCNC: 9 U/L (ref 0–50)
ANION GAP SERPL CALCULATED.3IONS-SCNC: 12 MMOL/L (ref 7–15)
AST SERPL W P-5'-P-CCNC: 40 U/L (ref 0–45)
BASOPHILS # BLD AUTO: 0 10E3/UL (ref 0–0.2)
BASOPHILS NFR BLD AUTO: 0 %
BILIRUB SERPL-MCNC: <0.2 MG/DL
BLD GP AB SCN SERPL QL: NEGATIVE
BUN SERPL-MCNC: 7.2 MG/DL (ref 6–20)
CALCIUM SERPL-MCNC: 8.7 MG/DL (ref 8.8–10.4)
CHLORIDE SERPL-SCNC: 104 MMOL/L (ref 98–107)
CREAT SERPL-MCNC: 0.54 MG/DL (ref 0.51–0.95)
CREAT UR-MCNC: 41.3 MG/DL
EGFRCR SERPLBLD CKD-EPI 2021: >90 ML/MIN/1.73M2
EOSINOPHIL # BLD AUTO: 0 10E3/UL (ref 0–0.7)
EOSINOPHIL NFR BLD AUTO: 0 %
ERYTHROCYTE [DISTWIDTH] IN BLOOD BY AUTOMATED COUNT: 15.9 % (ref 10–15)
GLUCOSE SERPL-MCNC: 100 MG/DL (ref 70–99)
HCO3 SERPL-SCNC: 19 MMOL/L (ref 22–29)
HCT VFR BLD AUTO: 35.6 % (ref 35–47)
HGB BLD-MCNC: 11.7 G/DL (ref 11.7–15.7)
HOLD SPECIMEN: NORMAL
IMM GRANULOCYTES # BLD: 0.1 10E3/UL
IMM GRANULOCYTES NFR BLD: 1 %
LYMPHOCYTES # BLD AUTO: 2.4 10E3/UL (ref 0.8–5.3)
LYMPHOCYTES NFR BLD AUTO: 17 %
MCH RBC QN AUTO: 28.4 PG (ref 26.5–33)
MCHC RBC AUTO-ENTMCNC: 32.9 G/DL (ref 31.5–36.5)
MCV RBC AUTO: 86 FL (ref 78–100)
MONOCYTES # BLD AUTO: 0.6 10E3/UL (ref 0–1.3)
MONOCYTES NFR BLD AUTO: 4 %
NEUTROPHILS # BLD AUTO: 11.1 10E3/UL (ref 1.6–8.3)
NEUTROPHILS NFR BLD AUTO: 78 %
NRBC # BLD AUTO: 0 10E3/UL
NRBC BLD AUTO-RTO: 0 /100
PLATELET # BLD AUTO: 298 10E3/UL (ref 150–450)
POTASSIUM SERPL-SCNC: 5 MMOL/L (ref 3.4–5.3)
PROT SERPL-MCNC: 7.5 G/DL (ref 6.4–8.3)
PROT/CREAT 24H UR: NORMAL MG/G{CREAT}
RBC # BLD AUTO: 4.12 10E6/UL (ref 3.8–5.2)
SODIUM SERPL-SCNC: 135 MMOL/L (ref 135–145)
SPECIMEN EXP DATE BLD: NORMAL
WBC # BLD AUTO: 14.1 10E3/UL (ref 4–11)

## 2025-05-20 PROCEDURE — 59025 FETAL NON-STRESS TEST: CPT

## 2025-05-20 PROCEDURE — 86900 BLOOD TYPING SEROLOGIC ABO: CPT

## 2025-05-20 PROCEDURE — G0463 HOSPITAL OUTPT CLINIC VISIT: HCPCS | Mod: 25 | Performed by: STUDENT IN AN ORGANIZED HEALTH CARE EDUCATION/TRAINING PROGRAM

## 2025-05-20 PROCEDURE — 250N000011 HC RX IP 250 OP 636

## 2025-05-20 PROCEDURE — 999N000040 HC STATISTIC CONSULT NO CHARGE VASC ACCESS

## 2025-05-20 PROCEDURE — 120N000002 HC R&B MED SURG/OB UMMC

## 2025-05-20 PROCEDURE — 85014 HEMATOCRIT: CPT

## 2025-05-20 PROCEDURE — 99222 1ST HOSP IP/OBS MODERATE 55: CPT | Mod: GC | Performed by: OBSTETRICS & GYNECOLOGY

## 2025-05-20 PROCEDURE — 80053 COMPREHEN METABOLIC PANEL: CPT

## 2025-05-20 PROCEDURE — 84156 ASSAY OF PROTEIN URINE: CPT

## 2025-05-20 PROCEDURE — 76820 UMBILICAL ARTERY ECHO: CPT

## 2025-05-20 RX ORDER — DOCUSATE SODIUM 100 MG/1
100 CAPSULE, LIQUID FILLED ORAL 2 TIMES DAILY
Status: DISCONTINUED | OUTPATIENT
Start: 2025-05-20 | End: 2025-05-22

## 2025-05-20 RX ORDER — PRENATAL VIT/IRON FUM/FOLIC AC 27MG-0.8MG
1 TABLET ORAL DAILY
Status: DISCONTINUED | OUTPATIENT
Start: 2025-05-20 | End: 2025-05-22

## 2025-05-20 RX ORDER — ASPIRIN 81 MG/1
81 TABLET ORAL DAILY
Status: DISCONTINUED | OUTPATIENT
Start: 2025-05-20 | End: 2025-05-22

## 2025-05-20 RX ORDER — BETAMETHASONE SODIUM PHOSPHATE AND BETAMETHASONE ACETATE 3; 3 MG/ML; MG/ML
12 INJECTION, SUSPENSION INTRA-ARTICULAR; INTRALESIONAL; INTRAMUSCULAR; SOFT TISSUE EVERY 24 HOURS
Status: COMPLETED | OUTPATIENT
Start: 2025-05-20 | End: 2025-05-21

## 2025-05-20 RX ORDER — ACETAMINOPHEN 325 MG/1
650 TABLET ORAL EVERY 4 HOURS PRN
Status: DISCONTINUED | OUTPATIENT
Start: 2025-05-20 | End: 2025-05-22

## 2025-05-20 RX ADMIN — BETAMETHASONE SODIUM PHOSPHATE AND BETAMETHASONE ACETATE 12 MG: 3; 3 INJECTION, SUSPENSION INTRA-ARTICULAR; INTRALESIONAL; INTRAMUSCULAR at 19:55

## 2025-05-20 ASSESSMENT — ACTIVITIES OF DAILY LIVING (ADL)
ADLS_ACUITY_SCORE: 32
ADLS_ACUITY_SCORE: 58
ADLS_ACUITY_SCORE: 32

## 2025-05-20 NOTE — H&P
Antepartum History and Physical     May 20, 2025  Malena Thomas  [id number]      HPI     Malena Thomas is a 34 year old  at 28w4d by LMP US who presents for betamethasone administration and extended fetal monitoring in the setting of abnormal umbilical artery dopplers and non-reassuring fetal status. Her pregnancy is further notable as listed below.     Today, Malena presented for her scheduled ultrasound due to fetal growth restriction and prior abnormal umbilical artery dopplers. On presentation, ultrasound notable for persistent absent end diastolic flow with intermittent reversed end diastolic flow. An NST was additionally performed and noted to be non-reactive with a prolonged deceleration to the 70s lasting 2 minutes. Given this constellation of findings, it was recommended that she present for betamethasone administration and extended fetal monitoring.     Malena reports that she is feeling well tonight. Endorses regular fetal movement. No leakage of fluid, vaginal bleeding, or uterine contractions. No chest pain, shortness of breath, RUQ pain, headache, visual changes, fevers, chills, or other systemic symptoms.     States that her prior pregnancies were notable for two second trimester losses, a miscarriage at 15w0d as well as a stillbirth following presentation for vaginal bleeding at 25w5d. She reports that her most recent delivery was at 34w2d via  section due to preeclampsia with severe features. She desires a repeat  section for delivery of this pregnancy.     Her pregnancy has been notable for:  - Fetal growth restriction (EFW 6% on 25)  - Fetal IREDF, non-reassuring fetal status  - History of PPROM, placental abruption with fetal demise (25w5d)  - History of preeclampsia w/ severe features  - History of HELLP syndrome w/ subsequent DIC requiring ICU admission  - History of severe fetal growth restriction  - History of  section x 1 (PTD at 34w2d in the setting of  unsuccessful IOL)   - Late entry to prenatal care     OB History    Para Term  AB Living   5 2 0 2 2 1   SAB IAB Ectopic Multiple Live Births   0 0 0 0 1      # Outcome Date GA Lbr Bayron/2nd Weight Sex Type Anes PTL Lv   5 Current            4  23 34w2d   F CS-LTranv IV Y KAUSHIK      Complications: Dysfunctional Labor      Name: RANJIT ASTUDILLO      Apgar1: 7  Apgar5: 9   3 AB 22 15w0d    SAB      2  22 25w5d 03:16 / 00:04 0.775 kg (1 lb 11.3 oz) M Vag-Spont IV N FD      Complications: Abruptio Placenta      Name: GEORGE ASTUDILLO-ALIA FD      Apgar1: 0  Apgar5: 0   1 AB  16w0d    IAB        Past Medical History     Past Medical History:   Diagnosis Date    Known health problems: none      Past Surgical History     Past Surgical History:   Procedure Laterality Date     SECTION N/A 2023    Procedure:  section;  Surgeon: Karma Fields MD;  Location:  L+D     Medications     No current facility-administered medications for this encounter.     Allergies   No Known Allergies    Family History     Family History   Problem Relation Age of Onset    No Known Problems Mother     No Known Problems Father     No Known Problems Maternal Grandmother     No Known Problems Maternal Grandfather     No Known Problems Paternal Grandmother     No Known Problems Paternal Grandfather     No Known Problems Brother     No Known Problems Sister     No Known Problems Daughter      Social History     Social History     Socioeconomic History    Marital status: Single   Tobacco Use    Smoking status: Never    Smokeless tobacco: Never   Vaping Use    Vaping status: Never Used   Substance and Sexual Activity    Alcohol use: Not Currently    Drug use: Never    Sexual activity: Yes     Partners: Male     Social Drivers of Health     Financial Resource Strain: Low Risk  (2025)    Financial Resource Strain     Within the past 12 months, have you or your family members you  live with been unable to get utilities (heat, electricity) when it was really needed?: No   Food Insecurity: Low Risk  (5/20/2025)    Food Insecurity     Within the past 12 months, did you worry that your food would run out before you got money to buy more?: No     Within the past 12 months, did the food you bought just not last and you didn t have money to get more?: No   Transportation Needs: Low Risk  (5/20/2025)    Transportation Needs     Within the past 12 months, has lack of transportation kept you from medical appointments, getting your medicines, non-medical meetings or appointments, work, or from getting things that you need?: No   Interpersonal Safety: Low Risk  (5/20/2025)    Interpersonal Safety     Do you feel physically and emotionally safe where you currently live?: Yes     Within the past 12 months, have you been hit, slapped, kicked or otherwise physically hurt by someone?: No     Within the past 12 months, have you been humiliated or emotionally abused in other ways by your partner or ex-partner?: No   Housing Stability: Low Risk  (5/20/2025)    Housing Stability     Do you have housing? : Yes     Are you worried about losing your housing?: No     ROS   10-point ROS negative except as indicated in HPI.    Physical Exam     Vitals:    05/20/25 1642 05/20/25 1738   BP: 121/74 121/67   BP Location: Left arm Left arm   Patient Position: Semi-Cheney's Semi-Cheney's   Cuff Size: Adult Regular Adult Regular   Resp: 19    Temp: 98  F (36.7  C)    TempSrc: Oral      General: alert and oriented female, resting in bed in no acute distress  CV: regular rate, well perfused  Lungs: non-labored breathing on room air  Abdomen: soft, gravid, non-tender   Extremities: bilateral lower extremities non-tender with trace edema    Presentation: Cephalic by BSUS    FHT: baseline 130, minimal to moderate variability, accelerations present, no decelerations  [place]: quiet  Impression: non-reactive, though appropriate for  "gestational age     Labs     Lab Results   Component Value Date    AS Negative 04/10/2025    HEPBANG Nonreactive 04/10/2025    CHPCRT Negative 2023    GCPCRT Negative 2023    HGB 11.5 (L) 04/10/2025     GBS Status:   No results found for: \"GBS\"    No results found for: \"PAP\"    Results for orders placed or performed during the hospital encounter of 25 (from the past 24 hours)   Maternal Fetal US OB Limited Single/Multiple    Narrative            Limited  ---------------------------------------------------------------------------------------------------------  Pat. Name: BAUDILIOALIA DEL VALLE       Study Date:  2025 3:22pm  Pat. NO:  [id number]        Referring  MD: JACKY PUENTE  Site:         Sonographer: Elias Steel RDMS   :  1990        Age:   34  ---------------------------------------------------------------------------------------------------------    INDICATION  ---------------------------------------------------------------------------------------------------------  FGR with intermittent absent EDF (since 25; has not received BMTZ)  History IUFD (25w, placental abruption, severe pre-eclampsia/HELLP)  History of Severe Preeclampsia and FGR with PTD (34 weeks)  Multiple 2nd trimester pregnancy losses  Late prenatal care (dated w sono)  BMI 33      METHOD  ---------------------------------------------------------------------------------------------------------  Transabdominal ultrasound examination. View: Sufficient      PREGNANCY  ---------------------------------------------------------------------------------------------------------  Garner pregnancy. Number of fetuses: 1      DATING  ---------------------------------------------------------------------------------------------------------                                           Date                                Details                                                                                      Gest. age       "                MANUEL  LMP                                  11/1/2024                                                                                                                         28 w + 4 d                     8/8/2025  Previous U/S                      4/10/2025                        GA, GA 23 w + 0 d                                                                      28 w + 5 d                     8/7/2025  Assigned dating                  based on the LMP, selected on 05/20/2025                                                                         28 w + 4 d                     8/8/2025      GENERAL EVALUATION  ---------------------------------------------------------------------------------------------------------  Cardiac activity present.  bpm. Fetal movements: visualized. Presentation: cephalic  Placenta: Anterior, left, No Previa, > 2 cm from internal os  Umbilical cord: previously studied  Amniotic fluid: Amount of AF: normal, subjectively low. MVP 3.9 cm      FETAL DOPPLER  ---------------------------------------------------------------------------------------------------------  Umbilical Artery: abnormal, Absent End Diastolic Flow, Intermittent Reversed End Diastolic Flow      MATERNAL STRUCTURES  ---------------------------------------------------------------------------------------------------------  Right Ovary                          Not examined  Left Ovary                            Not examined      NON STRESS TEST  ---------------------------------------------------------------------------------------------------------  NST interpretation: non-reactive. Test duration 43 min. Baseline  bpm. Baseline variability: minimal. Accelerations: absent. Decelerations: present, prolonged  deceleration juve 70 x 2 minutes, 40 minutes subsequent tracing without deceleration. Uterine activity: absent. CTG category: abnormal and indicates need for  conservative measures and further  testing      RECOMMENDATION  ---------------------------------------------------------------------------------------------------------  Thank-you for referring your patient for  surveillance in the setting of fetal growth restriction.    We discussed the findings on today's ultrasound of persistent absent EDF with intermittent reverse EDF with the patient. We discussed that this, combined with the  non-reactive and non-reassuring NST with a spontaneous prolonged deceleration, we recommend admission to the antepartum service for prolonged FHR monitoring and  BTMZ course. With rEDF, we anticipate hospital admission until delivery, which is indicated at 30-32 weeks gestation unless indicated sooner due to fetal heart rate  monitoring. I discussed with Malena my concern for placental insufficiency and deterioration and increased risk for stillbirth with the above findings. In a large study on FGR,  the mean time to-delivery interval for umbilical artery PI with reversed end diastolic flow was 4 days. Given non reassuring FHR tracing, I recommended immediately  proceeding to [facility] for the above. However she and her partner prefer admission at [place]. They state understanding of adverse fetal outcome while in route  to [place] and that this is not our recommendation. However in shared decision making, this is their preferred decision. Malena states she will proceed immediately to  [place] for admission.    I discussed the above with my partner, Dr. Herr, who is covering the [facility] antepartum service. I additionally paged Dr. Fields of [facility], on call for the patient's primary  team.    If you have questions regarding today's evaluation or if we can be of further service, please contact the [facility].    **Fetal anomalies may be present but not detected**    I spent a total of 25 minutes (excluding the ultrasound interpretation) on the date of this encounter including preparing  to see the patient (reviewing medical records/tests),  in direct face-to-face contact with the patient during the visit counseling and discussing the plan of care and documenting the visit in the electronic medical record.        Impression    IMPRESSION  ---------------------------------------------------------------------------------------------------------  1. Garner pregnancy at 28w 4d with fetal growth restriction (EFW 6% on 25).  2. The amniotic fluid volume appeared subjectively low however MVP WNL at 3.9 cm.  3. The umbilical artery Dopplers were abnormal: Absent End Diastolic Flow, Intermittent Reversed End Diastolic Flow.  4. The fetal heart rate monitoring was not reactive or reassuring with 1 prolonged deceleration as above, minimal variability with improvement in variability and one 10x10  acceleration toward the end of monitoring.  5. Cephalic presentation.            Imaging       [facility] US OB LIMITED (2025)    IMPRESSION  ---------------------------------------------------------------------------------------------------------  1. Garner pregnancy at 28w 4d with fetal growth restriction (EFW 6% on 25).  2. The amniotic fluid volume appeared subjectively low however MVP WNL at 3.9 cm.  3. The umbilical artery Dopplers were abnormal: Absent End Diastolic Flow, Intermittent Reversed End Diastolic Flow.  4. The fetal heart rate monitoring was not reactive or reassuring with 1 prolonged deceleration as above, minimal variability with improvement in variability and one 10x10  acceleration toward the end of monitoring.  5. Cephalic presentation.     Assessment/Plan     Malena Thomas is a 34 year old  at 28w4d by LMP who presents for extended fetal monitoring and betamethasone administration due to new findings of intermittent reversed end diastolic flow on umbilical artery dopplers as well as non-reassuring fetal status on NST performed this afternoon. Her pregnancy is notable as listed  below.       #Fetal growth restriction  #Abnormal UAD, most recently w/ persistent absent and intermittent reversed end diastolic flow  #History of HELLP syndrome  #History of preeclampsia w/ severe features  #History of intrauterine fetal demise  Discussed with Malena the implications of the abnormal umbilical artery dopplers that were identified on her ultrasound today. Discussed that the abnormal blood flow through the umbilical cord, as well as the fetal heart rate deceleration observed on her monitoring prior to presentation are concerning for periods of fetal distress. Discussed that given these findings,we would recommend administration of betamethasone course as it is nearly inevitable that her infant will be born . Discussed that with rEDF, recommendation to move forward with delivery is around 30-32 weeks, however, if there were signs of fetal distress, it is possible that her baby would need to be delivered prior to this goal. Discussed that in the event that non-reassuring fetal distress is unresponsive to resuscitative measures, an emergent  section may be recommended. Discussed risks of  to include bleeding, infection, and damage to surrounding organs (bladder, bowel, blood vessels, nerves, baby, ureters). Discussed that given fetal growth restriction and early gestational age, a classical  section may be indicated. Discussed that a classical were performed, she would not be able to labor in the future and all subsequent pregnancies would need to be delivered via repeat  section between 36-37 weeks. She expressed understanding. Signed consent obtained with assistance of [place] .   - Betamethasone course ordered, to receive first dose tonight  - If delivery indicated, will start magnesium sulfate infusion for neuroprotection  - NNP consultation placed, appreciate excellent cares  - Plan for repeat UAD w/ BPP tomorrow  - Given history of preeclampsia/HELLP  syndrome as well as fetal growth restriction, will collect HELLP labs on admission  - S/p CCS consent as above      # Inpatient Management  - Activity as tolerated  - Clear liquid diet; will advance to low residue/regular diet pending fetal monitoring  - SCD's while confined to bed; consider Lovenox pending length of antepartum admission  - Q72 hour CBC type/screen     # FWB  - Continuous fetal monitoring  - Ultrasound surveillance plan: plan for repeat UAD w/ BPP tomorrow  - BMZ course to begin tonight  - Magnesium if delivery indicated  - NICU consultation placed, appreciate excellent cares     # Routine prenatal care  - Rh positive; Antibody negative   - Rubella immune, Hepatitis B NR, Hepatitis C NR, HIV NR, RPR negative   - GBS unknown, to collect on admission  - GCT not yet performed; consider BG monitoring while inpatient given unknown GDM status  - Other prenatal labs wnl    - Imaging     -- Dating: LMP     -- Anatomy: within normal limits   - Immunizations: Plan for Tdap between 27 to 36 weeks   - Pap last: never had, previously declined; strongly recommend postpartum    - Contraception: Will discuss if delivery indicated   - Feeding: Will discuss if delivery indicated      # Delivery Plan:     # History of  x 1  - MOD: desires repeat  section, s/p CCS consent (see above)  - TOD: 30-32 weeks pending UAD     Patient seen and care plan discussed under supervision of Dr. Fields.    Emma Ziegler MD  Obstetrics and Gynecology, PGY-2  25 10:14 PM

## 2025-05-20 NOTE — NURSING NOTE
Patient presents to [facility] for [place]/UAR/NST at 28w4d due to FGR, absent UAR's. Denies LOF, vaginal bleeding, cramping/contractions. Patient put on NST. After two minutes of monitoring, decel x2 minutes to the 70's noted. Patient repositioned to right side in NST chair. Dr Paniagua was called to room to assess. FHT back to baseline with minimal to moderate variability. It was recommended patient go to  LD for further monitoring. Patient declined and would only go to UR LD. UAR performed in US room and patient left clinic ambulatory and understands to go to LD for further monitoring. Report called to KAI Galaviz. Dr Paniagua messaged Dr Fields. SBAR given to DAXA PETERS, see their note in Epic.     Rosa Madrigal RN

## 2025-05-20 NOTE — PLAN OF CARE
Data: Patient admitted to room 426 at 1624. Patient is a . Prenatal record reviewed.   OB History    Para Term  AB Living   5 2 0 2 2 1   SAB IAB Ectopic Multiple Live Births   0 0 0 0 1      # Outcome Date GA Lbr Bayron/2nd Weight Sex Type Anes PTL Lv   5 Current            4  23 34w2d   F CS-LTranv IV Y KAUSHIK      Complications: Dysfunctional Labor      Name: BAUDILIOFEMALE-ALIA      Apgar1: 7  Apgar5: 9   3 AB 22 15w0d    SAB      2  22 25w5d 03:16 / 00:04 0.775 kg (1 lb 11.3 oz) M Vag-Spont IV N FD      Complications: Abruptio Placenta      Name: GEORGE ASTUDILLO-ALIA FD      Apgar1: 0  Apgar5: 0   1 AB  16w0d    IAB      .  Medical History:   Past Medical History:   Diagnosis Date    Known health problems: none    .  Gestational age 28w4d. Vital signs per doc flowsheet. Fetal movement present. Patient reports sent directly from [facility] clinic following US this afternoon   as reason for admission. Support person present.  Action: Verbal consent for EFM, external fetal monitors applied. Admission assessment completed. Patient and support persons educated on admission to antepartum. Patient instructed to report change in fetal movement, contractions, vaginal leaking of fluid or bleeding, abdominal pain, or any concerns related to the pregnancy to her nurse/physician. Patient oriented to room, call light in reach.   Response: Dr. Ziegler informed of patient arrival, , 28.4, presented to antepartum from [facility] clinic for persistent absent EDF w/ intermittent reversal and a non reactive NST.

## 2025-05-21 ENCOUNTER — APPOINTMENT (OUTPATIENT)
Dept: ULTRASOUND IMAGING | Facility: CLINIC | Age: 35
End: 2025-05-21
Payer: COMMERCIAL

## 2025-05-21 ENCOUNTER — ANESTHESIA EVENT (OUTPATIENT)
Dept: OBGYN | Facility: CLINIC | Age: 35
End: 2025-05-21
Payer: COMMERCIAL

## 2025-05-21 ENCOUNTER — RESULTS FOLLOW-UP (OUTPATIENT)
Dept: OBGYN | Facility: CLINIC | Age: 35
End: 2025-05-21

## 2025-05-21 PROCEDURE — 250N000011 HC RX IP 250 OP 636

## 2025-05-21 PROCEDURE — 76820 UMBILICAL ARTERY ECHO: CPT | Mod: 26 | Performed by: OBSTETRICS & GYNECOLOGY

## 2025-05-21 PROCEDURE — 76819 FETAL BIOPHYS PROFIL W/O NST: CPT | Mod: 26 | Performed by: OBSTETRICS & GYNECOLOGY

## 2025-05-21 PROCEDURE — 250N000013 HC RX MED GY IP 250 OP 250 PS 637

## 2025-05-21 PROCEDURE — 76820 UMBILICAL ARTERY ECHO: CPT

## 2025-05-21 PROCEDURE — 99222 1ST HOSP IP/OBS MODERATE 55: CPT

## 2025-05-21 PROCEDURE — 99232 SBSQ HOSP IP/OBS MODERATE 35: CPT | Performed by: OBSTETRICS & GYNECOLOGY

## 2025-05-21 PROCEDURE — 120N000002 HC R&B MED SURG/OB UMMC

## 2025-05-21 RX ADMIN — PRENATAL VIT W/ FE FUMARATE-FA TAB 27-0.8 MG 1 TABLET: 27-0.8 TAB at 08:43

## 2025-05-21 RX ADMIN — BETAMETHASONE SODIUM PHOSPHATE AND BETAMETHASONE ACETATE 12 MG: 3; 3 INJECTION, SUSPENSION INTRA-ARTICULAR; INTRALESIONAL; INTRAMUSCULAR at 19:51

## 2025-05-21 RX ADMIN — ACETAMINOPHEN 650 MG: 325 TABLET ORAL at 08:57

## 2025-05-21 RX ADMIN — DOCUSATE SODIUM 100 MG: 100 CAPSULE, LIQUID FILLED ORAL at 08:43

## 2025-05-21 RX ADMIN — ASPIRIN 81 MG: 81 TABLET ORAL at 08:43

## 2025-05-21 ASSESSMENT — ACTIVITIES OF DAILY LIVING (ADL)
ADLS_ACUITY_SCORE: 32

## 2025-05-21 NOTE — PROVIDER NOTIFICATION
05/20/25 2122   Provider Notification   Provider Name/Title Diamond PETERS   Method of Notification At Bedside     Dr Fields bedside to see Malena. Discussed EFM tracing and plan for overnight stay, okay for Malena to eat dinner tonight and Dr Fields discussed US for tomorrow morning and second dose of beta tomorrow afternoon.

## 2025-05-21 NOTE — PROVIDER NOTIFICATION
05/21/25 1215   Provider Notification   Provider Name/Title    Method of Notification Phone   Request Evaluate - Remote   Notification Reason Variability Change   Comments   Nursing Comments RN reported FHT has shown minimal variability the last 30 min and a few variables. Provider responded to let her know if variability doesn't perk up within the next 30 minutes.     Refer to initial provider notification above at 1215 pm. Shortly after, RN notified provider again at 12:59 pm that FHR variability has improved and provider is in agreement. Plan per provider is for RN to continue EFM continuously until delivery and RN is to report any concerns.

## 2025-05-21 NOTE — PLAN OF CARE
Malena has been able to rest on and off over night in between cares. VSS, afebrile. Denies any HA, blurry vision, N/V, RUQ pain, SOB, chest pain, fever/chills, cramping/ctx, vaginal bleeding or leaking of fluid. IV flushed. See FS for EFM interpretation. FHR tracing has had periods of moderate variability with accels overnight, however majority of tracing has been cat II with minimal variability with episodic decels. Plan for an US this morning which Malena is anxious for. Pt reoriented to call light, and reviewed/encouraged patient to notify staff of any changes in condition.

## 2025-05-21 NOTE — PROGRESS NOTES
OB MD Antepartum Progress Note  Date:  25      S:  Alia reports headache this morning, but otherwise denies complaints.  HA started around 0400 this morning.  No other s/sx of pre-e.  Did eat dinner last night, but has been on clears since then.  Not on IVF, but feels she's adequately hydrating with PO fluids.  Doesn't usually drink caffeine.    Denies contractions, vaginal bleeding, leakage of fluid.  Normal FM.    O:  Temp: 98.2  F (36.8  C) Temp src: Oral BP: 131/76     Resp: 16   O2 Device: None (Room air)    FHT:  120/mod variability, + accels.  Had two decels overnight, but none in last 4 hours.  Cat 1 at present  Hector:  quiet  SVE:  deferred      +++++++++++++++++++++++++++++++++++    BPP  ---------------------------------------------------------------------------------------------------------  Pat. Name: ALIA ASTUDILLO       Study Date:  2025 7:28am  Pat. NO:  [id number]        Referring  MD: SUNNI BROWNLEE  Site:         Sonographer: Shazia Vigil RDMS   :  1990        Age:   34  ---------------------------------------------------------------------------------------------------------     INDICATION  ---------------------------------------------------------------------------------------------------------  FGR with intermittent absent EDF  History IUFD (25w, placental abruption, severe pre-eclampsia/HELLP)  History of Severe Preeclampsia and FGR with PTD (34 weeks)  Multiple 2nd trimester pregnancy losses  Late prenatal care (dated w sono)  BMI 33        METHOD  ---------------------------------------------------------------------------------------------------------  Transabdominal ultrasound examination. View: Sufficient        PREGNANCY  ---------------------------------------------------------------------------------------------------------  Garner pregnancy. Number of fetuses: 1         DATING  ---------------------------------------------------------------------------------------------------------                                           Date                                Details                                                                                      Gest. age                      MANUEL  LMP                                  11/1/2024                                                                                                                         28 w + 5 d                     8/8/2025  Previous U/S                      4/10/2025                        GA, GA 23 w + 0 d                                                                      28 w + 6 d                     8/7/2025  Assigned dating                  based on the LMP, selected on 05/20/2025                                                                         28 w + 5 d                     8/8/2025        GENERAL EVALUATION  ---------------------------------------------------------------------------------------------------------  Cardiac activity present.  bpm. Fetal movements: visualized. Presentation: cephalic  Placenta: Anterior, left, No Previa, > 2 cm from internal os  Umbilical cord: previously studied        AMNIOTIC FLUID ASSESSMENT  ---------------------------------------------------------------------------------------------------------  Amount of AF: normal, subjectively low  MVP 3.6 cm        BIOPHYSICAL PROFILE  ---------------------------------------------------------------------------------------------------------  2: Fetal breathing movements  0: Gross body movements  2: Fetal tone  2: Amniotic fluid volume  6/8 Biophysical profile score  Interpretation: equivocal        FETAL DOPPLER  ---------------------------------------------------------------------------------------------------------  Umbilical Artery: abnormal, Absent End Diastolic Flow. Intermittent Reversed End Diastolic Flow. Sampling  site: [facility]        RECOMMENDATION  ---------------------------------------------------------------------------------------------------------  The results of the ultrasound were discussed with Dr. Montgomery.     Recommend basing management plan on fetal heart rate tracing. Given these findings and the patient's poor obstetric history, consider delivery. The patient is currently  getting BTMS course. If the patient declines delivery, recommend three times a week doppler studies with inpatient antepartum care and re a follow growth US in the next 2  days.    +++++++++++++++++++++++++++++++++    A/P:  Malena Thomas is a 34 year old  at 28w4d by LMP who presents for extended fetal monitoring and betamethasone administration due to new findings of intermittent reversed end diastolic flow on umbilical artery dopplers yesterday as well as non-reassuring fetal status on NST performed in [facility] clinic yesterday. Her pregnancy is notable as listed below.         #Fetal growth restriction  #Abnormal UAD, most recently w/ absent end diastolic flow and intermittent reversed end diastolic flow  #History of HELLP syndrome  #History of preeclampsia w/ severe features  #History of intrauterine fetal demise  Discussed that the abnormal blood flow through the umbilical cord that was noted today, unchanged from yesterday.  Given this persistent finding, it is recommended to remain inpatient, finish course of BMTZ this evening, remain on continuous monitoring with plan for delivery via repeat  tomorrow afternoon, sooner if indicated by FHT.    - Betamethasone course ordered, second dose tonight  - If/when delivery indicated, will start magnesium sulfate infusion for neuroprotection  - NNP consultation placed; have not yet seen patient.  - Given history of preeclampsia/HELLP syndrome as well as fetal growth restriction, HELLP labs were collected on admission ( yesterday)  - S/p CCS consent on day of admission.  -Diet  discussed meal by meal.  General diet ordered for breakfast this morning with reassuring FHT, but will re-evaluate before each meal.    #Headache  Mindful of history of pre-e/HELLP syndrome.  However, BP remain in normal range at this time.  She was given Tylenol, is hydrating PO and will eat breakfast .  Will re-evaluate HA in the next couple hours.     # Inpatient Management  - Activity as tolerated  -Diet discussed meal by meal.  General diet ordered for breakfast this morning with reassuring FHT, but will re-evaluate before each meal.  - SCD's while confined to bed; will not start Lovenox  - Q72 hour CBC type/screen     # FWB  - Continuous fetal monitoring  - Ultrasound surveillance plan: repeated today.  Do not plan another repeat tomorrow.  - BMZ course with second dose tonight at 1930  - Magnesium whendelivery indicated  - NICU consultation placed, appreciate excellent cares     # Routine prenatal care  - Rh positive; Antibody negative   - Rubella immune, Hepatitis B NR, Hepatitis C NR, HIV NR, RPR negative   - GBS unknown.  Pos in last pregnancy.  Discussed rationale for testing and she is agreeable to RN collecting this later today.  - GCT not yet performed; consider checking BS in the morning prior to delivery  - Other prenatal labs wnl    - Imaging     -- Dating: LMP     -- Anatomy: within normal limits   - Immunizations: Plan for Tdap between 27 to 36 weeks   - Contraception: Will discuss if delivery indicated   - Feeding: Will discuss if delivery indicated       # Delivery Plan:     # History of  x 1  - MOD: desires repeat  section, s/p CCS consent (see above)  - TOD: Tomorrow at 1230 with Dr. Hernan Finch MD  9:20 AM      45 minutes spent by me on the date of the encounter doing chart review, review of test results, interpretation of tests, patient visit, documentation, and discussion with other provider(s) .  US results reviewed on day of encounter and discussed with  Dr. Herr of [facility].

## 2025-05-21 NOTE — PROGRESS NOTES
[facility]  Antepartum Progress Note    Subjective   Patient seen with assistance of a virtual [place] . She is doing okay today. No contractions, vaginal bleeding, or loss of fluid. Endorses normal fetal movement.    Objective     Vitals:    05/22/25 1715 05/22/25 1730 05/22/25 1745 05/22/25 1830   BP: (!) 124/95 111/74 112/72 (P) 105/78   Pulse:       Resp: 18 16  16   Temp:    98.4  F (36.9  C)   TempSrc:    Oral   SpO2: 100% 100% 98% 100%   Weight:       Gen:  Resting comfortably, NAD  CV:  Regular rate and rhythm, well perfused  Pulm:  Non-labored breathing on room air  Abd:  Gravid, non-tender, non-distended   Ext:  Non-tender, trace edema to bilateral lower extremities    I/O last 3 completed shifts:  In: 300 [I.V.:300]  Out: 607 [Blood:607]    Weight:   Wt Readings from Last 2 Encounters:   05/21/25 98.4 kg (217 lb)   05/12/25 97.1 kg (214 lb)     FHT: Baseline 120 bpm, minimal variability, accelerations present, decelerations absent  Osgood: 0 contractions in 10 minutes  Impression: non-reactive    Prenatal Labs:  Rh positive, antibody negative  Rubella immune  Hepatitis B sAg NR, Hepatitis C NR, HIV NR, RPR negative  GC/Chlam negative  GCT not collected;  GBS unk    Labs:  No new labs in last 12h    Imaging:   [facility] OB LIMITED (5/20/2025)     IMPRESSION  ---------------------------------------------------------------------------------------------------------  1. Garner pregnancy at 28w 4d with fetal growth restriction (EFW 6% on 5/6/25).  2. The amniotic fluid volume appeared subjectively low however MVP WNL at 3.9 cm.  3. The umbilical artery Dopplers were abnormal: Absent End Diastolic Flow, Intermittent Reversed End Diastolic Flow.  4. The fetal heart rate monitoring was not reactive or reassuring with 1 prolonged deceleration as above, minimal variability with improvement in variability and one 10x10  acceleration toward the end of  monitoring.  5. Cephalic presentation.       Assessment/Plan   Malena Thomas is a 34 year old  at 28w6d, HD#3 for extended fetal monitoring and betamethasone administration due to new findings of intermittent reversed end diastolic flow on umbilical artery dopplers as well as non-reassuring fetal status on NST performed prior to admission. Yesterday concerns for fetal wellbeing due to intermittent reversed end diastolic flow and FGR discussed at length with patient and  delivery recommended to occur today. Patient is amenable with this plan, wants whatever is safest for her baby.      Her pregnancy has been notable for:  - Fetal growth restriction (EFW 6% on 25)  - Fetal IREDF, non-reassuring fetal status  - History of PPROM, placental abruption with fetal demise (25w5d)  - History of preeclampsia w/ severe features  - History of HELLP syndrome w/ subsequent DIC requiring ICU admission  - History of severe fetal growth restriction  - History of  section x 1 (PTD at 34w2d in the setting of unsuccessful IOL)   - Late entry to prenatal care    #Fetal growth restriction  #Abnormal UAD, most recently w/ persistent absent and intermittent reversed end diastolic flow  #History of intrauterine fetal demise  Reviewed with Malena the implications of the abnormal umbilical artery dopplers that were identified on her ultrasound yesterday. Discussed that the abnormal blood flow through the umbilical cord, as well as the fetal heart rate decelerations observed on her monitoring prior to presentation are concerning for periods of fetal distress. Discussed that with rEDF, recommendation to move forward with delivery is around 30-32 weeks, however, if there were signs of fetal distress, it is possible that her baby would need to be delivered prior to this goal. Discussed that delivery is recommended at this point, and can plan for midday today as scheduled, however in the event that non-reassuring fetal  distress is unresponsive to resuscitative measures, an emergent  section may be recommended. Discussed risks of  to include bleeding, infection, and damage to surrounding organs (bladder, bowel, blood vessels, nerves, baby, ureters). Discussed that given fetal growth restriction and early gestational age, a classical  section may be indicated. Discussed that a classical were performed, she would not be able to labor in the future and all subsequent pregnancies would need to be delivered via repeat  section between 36-37 weeks. She expressed understanding. Signed consent obtained with assistance of 10sec .   - S/p BMZ   - Will start magnesium sulfate infusion for neuroprotection at least 1-1.5hrs prior to delivery  - NNP consultation placed, appreciate excellent cares  - S/p CCS consent as above     #History of HELLP syndrome  #History of preeclampsia w/ severe features  BP thus far wnl during admission  - not on long acting antihypertensives, IV antihypertensives for severe range BP  - HELLP labs wnl on admission ()     # Inpatient Management  - Activity as tolerated  - NPO   - SCDs     # FWB  - Continuous fetal monitoring  - Ultrasound surveillance plan: plan for repeat UAD w/ BPP tomorrow  - s/p BMZ   - Magnesium to start at 1100  - NICU consultation placed, appreciate excellent cares     # Routine prenatal care  - Rh positive; Antibody negative   - Rubella immune, Hepatitis B NR, Hepatitis C NR, HIV NR, RPR negative   - GBS pending  - GCT not yet performed; consider BG monitoring while inpatient given unknown GDM status  - Other prenatal labs wnl    - Imaging     -- Dating: LMP     -- Anatomy: within normal limits   - Immunizations: Plan for Tdap between 27 to 36 weeks   - Pap last: never had, previously declined; strongly recommend postpartum    - Contraception: Will discuss if delivery indicated   - Feeding: Will discuss if delivery indicated       #  Delivery Plan:     # History of  x 1  - MOD: desires repeat  section, s/p CCS consent (see above)  - TOD: today     Medically Ready for Discharge: Anticipated in 2-4 Days    Patient seen and care plan discussed under supervision of Dr. Becerra.    Whitney Wilson MD  Obstetrics & Gynecology, PGY-3  2025 7:33 PM

## 2025-05-21 NOTE — PLAN OF CARE
Patient reports to be doing well throughout RN shift. Vitals and assessment WNL. Afebrile. Patient denies any vaginal bleeding, leaking, or discharge. Denies SOB, chest pain, vision changes, N/V, or any other unordinary s/s. Patient reports active fetal movement. IV flushed x2. Reported painful headache of 9/10 at 0857 and received Tylenol per request. Patient reported headache diminished an hour later. CAT 2 FHR tracing- review flowsheet charting and prior CAT 2 huddle notes. Patient offered GBS swab collection this morning at 9 am and again at 5 pm by RN, however patient denied and would like the GBS swab collected at a later time tonight when their partner is gone. Patient resting and continuing positions at this time. Oriented to call light and understands to call out with any changes or conerns in condition. Report given to KAI Acevedo at 1935.

## 2025-05-21 NOTE — CONSULTS
"Consult received for Vascular access care.  Bedside RN notified VAS no longer available and to use additional resources. For additional needs place \"Nursing to Consult for Vascular Access\" [id number] order in EPIC.  "

## 2025-05-21 NOTE — ANESTHESIA PREPROCEDURE EVALUATION
Anesthesia Pre-Procedure Evaluation    Patient: Malena Thomas   MRN: [id number] : 1990          Procedure : Procedure(s):   section         Past Medical History:   Diagnosis Date    Known health problems: none       Past Surgical History:   Procedure Laterality Date     SECTION N/A 2023    Procedure:  section;  Surgeon: Karma Fields MD;  Location:  L+D      No Known Allergies   Social History     Tobacco Use    Smoking status: Never    Smokeless tobacco: Never   Substance Use Topics    Alcohol use: Not Currently      Wt Readings from Last 1 Encounters:   25 98.4 kg (217 lb)        Anesthesia Evaluation   Pt has had prior anesthetic.     No history of anesthetic complications       ROS/MED HX  ENT/Pulmonary:  - neg pulmonary ROS     Neurologic:  - neg neurologic ROS     Cardiovascular: Comment: Pulmonary HTN per TTE dated     (+)  - -   -  - -                                 Previous cardiac testing   Echo: Date: 2022 Results:  TTE 2022: Global and regional LV function is hyperkinetic with an EF of  65-70%. Relative wall thickness is increased consistent with concentric remodeling.  Left ventricular diastolic function is normal. No regional WMA.  Valves: Normal Mitral valve with Mild mitral insufficiency. Aortic Valve is tricuspid. Mild tricuspid insufficiency.  RV normal in function and size. RV systolic pressure is 43mmHg above the right atrial pressure suggestive of elevated PA pressure IVC diameter and respiratory changes fall into an intermediate range suggesting an RA pressure of 8 mmHg. No pericardial effusion is present. There is no prior study for direct comparison.  Stress Test:  Date: Results:    ECG Reviewed:  Date: 2022 Results:  Sinus rhythm  Cath:  Date: Results:      METS/Exercise Tolerance:     Hematologic:  - neg hematologic  ROS     Musculoskeletal:       GI/Hepatic:  - neg GI/hepatic ROS     Renal/Genitourinary:       Endo:  - neg  endo ROS     Psychiatric/Substance Use:  - neg psychiatric ROS     Infectious Disease:       Malignancy:       Other: Comment: , with Intrauterine pregnancy at 28w5d complicated by  - Fetal growth restriction (EFW 6% on 25)  - Fetal IREDF, non-reassuring fetal status  - History of PPROM, placental abruption with fetal demise (25w5d)  - History of preeclampsia w/ severe features  - History of HELLP syndrome w/ subsequent DIC requiring ICU admission  - History of severe fetal growth restriction  - History of  section x 1 (PTD at 34w2d in the setting of unsuccessful IOL)     Of note: TTE  reported pulmonary HTN. No RHC completed. No information on patient clinical picture at time of that echocardiogram.   2023 Patient reached a 34w pregnancy, reportedly asymptomatic from a cardiac standpoint. She underwent Csection under spinal with no anesthetic complications.     , 28w6d  Presented from clinic w non-reassuring NST in setting of intermittently absent/reversed end diastolic flow on umbilical artery doppler. Presents for betamethasone administration.    Hx of preE w SF, HELLP w subsequent DIC requiring ICU admission  Hx of IUFD at 25+5 in setting of placental abruption+PPROM. Severe FGR in a subsequent pregnancy.   , LTCS    (+)  , ,previous   (-) TOLAC candidate         Physical Exam  Airway  Mallampati: II  TM distance: >3 FB  Neck ROM: full  Mouth opening: >= 4 cm    Cardiovascular - normal exam Comments: Pulmonary HTN per TTE dated   Dental   (+) Minor Abnormalities - some fillings, tiny chips      Pulmonary - normal exam      Neurological - normal exam  She appears awake, alert and oriented x3.    Other Findings       OUTSIDE LABS:  CBC:   Lab Results   Component Value Date    WBC 14.1 (H) 2025    WBC 14.8 (H) 04/10/2025    HGB 11.7 2025    HGB 11.5 (L) 04/10/2025    HCT 35.6 2025    HCT 34.3 (L) 04/10/2025     2025      04/10/2025     BMP:   Lab Results   Component Value Date     05/20/2025     11/16/2022     11/16/2022    POTASSIUM 5.0 05/20/2025    POTASSIUM 3.8 11/16/2022    POTASSIUM 3.8 11/16/2022    CHLORIDE 104 05/20/2025    CHLORIDE 106 11/16/2022    CO2 19 (L) 05/20/2025    CO2 23 11/16/2022    BUN 7.2 05/20/2025    BUN 9 11/16/2022    CR 0.54 05/20/2025    CR 0.54 04/10/2025     (H) 05/20/2025     (H) 11/16/2022     (H) 11/16/2022     COAGS:   Lab Results   Component Value Date    PTT 27 11/16/2022    INR 1.07 11/16/2022    FIBR 429 11/16/2022     POC:   Lab Results   Component Value Date    HCG Positive (A) 11/16/2022     HEPATIC:   Lab Results   Component Value Date    ALBUMIN 3.1 (L) 05/20/2025    PROTTOTAL 7.5 05/20/2025    ALT 9 05/20/2025    AST 40 05/20/2025    ALKPHOS 134 05/20/2025    BILITOTAL <0.2 05/20/2025     OTHER:   Lab Results   Component Value Date    LACT 1.4 04/22/2022    A1C 4.9 04/10/2025    JASON 8.7 (L) 05/20/2025    MAG 3.8 (H) 04/22/2022    TSH 1.57 11/16/2022    CRP 12.0 (H) 04/22/2022       Anesthesia Plan    ASA Status:  3      NPO Status: NPO Appropriate   Anesthesia Type: Spinal.  Induction: intravenous.   Techniques and Equipment:       - Monitoring Plan: standard ASA monitoring     Consents    Anesthesia Plan(s) and associated risks, benefits, and realistic alternatives discussed. Questions answered and patient/representative(s) expressed understanding.     - Discussed: anesthesiologist     - Discussed with:  Patient, patient,         - Pt is DNR/DNI Status: no DNR     Blood Consent:      - Discussed with: patient, .     - Consented: consented to blood products     Postoperative Care    Pain management: multimodal analgesia.     Comments:                   Gerardo WORTHY Ma, MD    I have reviewed the pertinent notes and labs in the chart from the past 30 days and (re)examined the patient.  Any updates or changes from those notes are  reflected in this note.    Clinically Significant Risk Factors Present on Admission               # Hypoalbuminemia: Lowest albumin = 3.1 g/dL at 5/20/2025  7:10 PM, will monitor as appropriate   # Drug Induced Platelet Defect: home medication list includes an antiplatelet medication   # Hypertension: Noted on problem list

## 2025-05-21 NOTE — PLAN OF CARE
CAT 2 HUDDLE    A persistent Category II fetal heart rate tracing for 60 minutes.    Category II Algorithm     Fetal heart rate and uterine activity reviewed with primary RN (writer), providers, and charge RN in department at 14:28 pm.     EFM interpretation suggests: concern for persistent Category II tracing due to: minimal variability with periods of moderate variability and accelerations. EFM suggests concern for interruption of the oxygen pathway due to: variable decelerations.     Interventions to improve fetal oxygenation for a Category II tracing include: Category II algorithm reviewed, continue monitoring, and maternal positioning, and emotional support.     After discussion with provider and plan per provider: continue EFM continuously until delivery. RN is to report to provider if variables continue to occur or any other significant changes.

## 2025-05-21 NOTE — PROGRESS NOTES
Category 2 FHT Huddle    Notified by RN of persistent Category 2 FHT. Reviewed FHT in person with Dr. Finch, RN and charge RN.     FHT: Baseline 125bpm, minimal variability with periods of moderate variability, occasional 10x10 accels present; three variable decels over the last 8 hours, each lasting 1-1.5 mins with juve to 60s-80s although discontinuous so difficult to appreciate exact juve; one spontaneous 2 minute decel at 0934  Watchtower: 0 ctx in 10 minutes    Discussed that while tracing is Category 2, at this time there has not been a variable decel for about an hour, and that minimal variability can be largely explained by magnesium in addition to known severe FGR and abnormal UADs.     At this time will continue clear liquid diet and continuous fetal monitoring. If ongoing recurrent variable decels will consider earlier delivery, otherwise plan for delivery 5/22.     Whitney Wilson MD  Obstetrics & Gynecology, PGY-3  05/21/2025 3:30 PM

## 2025-05-21 NOTE — DISCHARGE SUMMARY
[facility]  Discharge Summary    Malena Thomas MRN# [id number]   Age: 34 year old YOB: 1990     Date of Admission:  2025  Date of Discharge::  ***  Admitting Physician:  Karma Fields MD  Discharge Physician:  ***        Admission Diagnosis:   Intrauterine pregnancy at 28w5d  - Fetal growth restriction (EFW 6% on 25)  - Fetal IREDF, non-reassuring fetal status  - History of PPROM, placental abruption with fetal demise (25w5d)  - History of preeclampsia w/ severe features  - History of HELLP syndrome w/ subsequent DIC requiring ICU admission  - History of severe fetal growth restriction  - History of  section x 1 (PTD at 34w2d in the setting of unsuccessful IOL)   - Late entry to prenatal care        Discharge Diagnosis:   Intrauterine pregnancy at ***w***d  ***    Clinically Significant Risk Factors       # Drug Induced Platelet Defect: home medication list includes an antiplatelet medication         # Hypoalbuminemia: Lowest albumin = 3.1 g/dL at 2025  7:10 PM, will monitor as appropriate  # Hypertension: Noted on problem list                        Procedures:   ***       Medications Prior to Admission:     Medications Prior to Admission   Medication Sig Dispense Refill Last Dose/Taking    aspirin 81 MG EC tablet Take 1 tablet (81 mg) by mouth daily. 90 tablet 3 2025    Prenatal Vit-Fe Fumarate-FA (PRENATAL MULTIVITAMIN W/IRON) 27-0.8 MG tablet Take 1 tablet by mouth daily. 90 tablet 3 2025        Discharge Medications:        Review of your medicines        UNREVIEWED medicines. Ask your doctor about these medicines        Dose / Directions   aspirin 81 MG EC tablet  Used for: Encounter for supervision of high risk pregnancy in second trimester, antepartum      Dose: 81 mg  Take 1 tablet (81 mg) by mouth daily.  Quantity: 90 tablet  Refills: 3     prenatal multivitamin w/iron 27-0.8 MG tablet  Used for: Encounter  for supervision of high risk pregnancy in second trimester, antepartum      Dose: 1 tablet  Take 1 tablet by mouth daily.  Quantity: 90 tablet  Refills: 3               Consultations:   {OBGYNCONSULTTEAMS:992260}  ***        Hospital Course:   Admission History & Antepartum  Malena Thomas is a 34 year old  at 28w4d by LMP who presents for extended fetal monitoring and betamethasone administration due to new findings of intermittent reversed end diastolic flow on umbilical artery dopplers as well as non-reassuring fetal status on NST performed this afternoon. Her pregnancy is notable as listed below.     Intrapartum  ***    // CS  The procedure was uncomplicated***.  QBL *** mL.      Findings:  ***    See operative report for full details.     Postpartum  //   The patient's postpartum course was ***unremarkable. She recovered as anticipated and experienced no complications.*** On discharge, her pain was well controlled. Vaginal bleeding was less than or similar to peak menstrual flow. She was spontaneously voiding without difficulty, ambulating well without dizziness, tolerating a normal diet without nausea or vomiting, and passing flatus. She was ***feeding. She desired {OBPPCONTRACEPTION (Optional):671887} for birth control. Rhogam was ***not indicated. She was discharged on postpartum day #***.    // CS  The patient's postoperative course was ***unremarkable.  She recovered as anticipated and experienced no post-operative complications.*** On discharge, her pain was well controlled. Vaginal bleeding was less than or similar to peak menstrual flow. She was spontaneously voiding without difficulty, ambulating well without dizziness, tolerating a normal diet without nausea or vomiting, and passing flatus. She was afebrile with a well-appearing incision. She was ***feeding. She desired {OBPPCONTRACEPTION (Optional):589696} for birth control. Rhogam was ***not indicated. She was discharged on postpartum day  #3***.    Postpartum hemoglobin:   Hemoglobin   Date Value Ref Range Status   2025 11.7 11.7 - 15.7 g/dL Final           Discharge Instructions and Follow-Up:   // ANTE  Discharge diet: Regular   Discharge activity: Resume prior to admission activity as tolerated. Call for temperature greater than 100.4 F, vaginal bleeding, loss of fluid, contractions, decreased fetal movement, or worsening abdominal pain.    Discharge follow-up: Follow up with ***primary OB in 1-2 weeks      //   Discharge diet: Regular   Discharge activity: Pelvic rest for 6 weeks including no sexual intercourse, tampons, or douching.   Call for temperature greater than 100.4 F, heavy vaginal bleeding, worsening abdominal pain, inability to void, nausea or vomiting, or foul smelling vaginal discharge.   Discharge follow-up: ***1 week mood check  ***1 week BP check  6 week routine postpartum visit     // CS  Discharge diet: Regular   Discharge activity: No lifting greater than 15 lbs, pushing, pulling, or other strenuous activity for 6 weeks. Pelvic rest for 6 weeks including no sexual intercourse, tampons, or douching. No driving while taking narcotic pain medications and until able to slam on the brakes without pain.   Call for temperature greater than 100.4 F, heavy vaginal bleeding, worsening abdominal pain, inability to avoid, nausea or vomiting, or foul smelling vaginal discharge.   Discharge follow-up: ***1 week mood check  ***1 week BP check  ***2 week incision check ([facility] only)  6 week routine postpartum visit with primary OB   Wound care: Keep incision clean and dry. No soaking in bath tub or vigorous scrubbing of incision site.        Discharge Disposition:   Discharge to home in stable condition    ***{refresh, change date/time, sign}       was pumping. She deferred birth control. Rhogam was not indicated. She was discharged on postpartum day #2.    Postpartum hemoglobin:   Hemoglobin   Date Value Ref Range Status   05/23/2025 9.0 (L) 11.7 - 15.7 g/dL Final     Patient Vitals for the past 24 hrs:   BP Temp Temp src Pulse Resp Weight   05/24/25 1204 118/70 -- -- 87 16 --   05/24/25 1135 -- -- -- -- -- 98.4 kg (216 lb 14.4 oz)   05/24/25 0818 117/75 98.4  F (36.9  C) Oral 85 -- --   05/23/25 2347 111/65 97.9  F (36.6  C) Oral -- 16 --             Discharge Instructions and Follow-Up:     Discharge diet: Regular   Discharge activity: No lifting greater than 15 lbs, pushing, pulling, or other strenuous activity for 6 weeks. Pelvic rest for 6 weeks including no sexual intercourse, tampons, or douching. No driving while taking narcotic pain medications and until able to slam on the brakes without pain.   Call for temperature greater than 100.4 F, heavy vaginal bleeding, worsening abdominal pain, inability to avoid, nausea or vomiting, or foul smelling vaginal discharge.   Discharge follow-up: 6 week routine postpartum visit with primary OB   Wound care: Keep incision clean and dry. No soaking in bath tub or vigorous scrubbing of incision site.        Discharge Disposition:   Discharge to home in stable condition    Joe Gunn MD  Obstetrics & Gynecology, PGY-2  05/24/2025 7:58 AM        Physician Attestation   I saw and evaluated this patient prior to discharge.  I discussed the patient with the resident/fellow and agree with plan of care as documented in the note.      I personally reviewed vital signs, medications, labs, and exam.    I personally spent 20 minutes on discharge activities.    Isaura Sutton MD  Date of Service (when I saw the patient): 05/24/25

## 2025-05-22 ENCOUNTER — ANESTHESIA (OUTPATIENT)
Dept: OBGYN | Facility: CLINIC | Age: 35
End: 2025-05-22
Payer: COMMERCIAL

## 2025-05-22 LAB
APTT PPP: 23 SECONDS (ref 22–38)
FIBRINOGEN PPP-MCNC: 435 MG/DL (ref 170–510)
HGB BLD-MCNC: 10.9 G/DL (ref 11.7–15.7)
INR PPP: 1.02 (ref 0.85–1.15)
MCV RBC AUTO: 89 FL (ref 78–100)
MCV RBC AUTO: 89 FL (ref 78–100)
PLATELET # BLD AUTO: 323 10E3/UL (ref 150–450)
PROTHROMBIN TIME: 13.9 SECONDS (ref 11.8–14.8)
T PALLIDUM AB SER QL: NONREACTIVE

## 2025-05-22 PROCEDURE — 250N000011 HC RX IP 250 OP 636: Performed by: STUDENT IN AN ORGANIZED HEALTH CARE EDUCATION/TRAINING PROGRAM

## 2025-05-22 PROCEDURE — 250N000009 HC RX 250

## 2025-05-22 PROCEDURE — 85730 THROMBOPLASTIN TIME PARTIAL: CPT

## 2025-05-22 PROCEDURE — 272N000001 HC OR GENERAL SUPPLY STERILE: Performed by: OBSTETRICS & GYNECOLOGY

## 2025-05-22 PROCEDURE — 360N000076 HC SURGERY LEVEL 3, PER MIN: Performed by: OBSTETRICS & GYNECOLOGY

## 2025-05-22 PROCEDURE — 36415 COLL VENOUS BLD VENIPUNCTURE: CPT

## 2025-05-22 PROCEDURE — 999N000141 HC STATISTIC PRE-PROCEDURE NURSING ASSESSMENT: Performed by: OBSTETRICS & GYNECOLOGY

## 2025-05-22 PROCEDURE — 271N000001 HC OR GENERAL SUPPLY NON-STERILE: Performed by: OBSTETRICS & GYNECOLOGY

## 2025-05-22 PROCEDURE — 258N000003 HC RX IP 258 OP 636

## 2025-05-22 PROCEDURE — 85610 PROTHROMBIN TIME: CPT

## 2025-05-22 PROCEDURE — 250N000013 HC RX MED GY IP 250 OP 250 PS 637: Performed by: OBSTETRICS & GYNECOLOGY

## 2025-05-22 PROCEDURE — 250N000011 HC RX IP 250 OP 636

## 2025-05-22 PROCEDURE — 250N000013 HC RX MED GY IP 250 OP 250 PS 637

## 2025-05-22 PROCEDURE — 88307 TISSUE EXAM BY PATHOLOGIST: CPT | Mod: 26 | Performed by: PATHOLOGY

## 2025-05-22 PROCEDURE — 59515 CESAREAN DELIVERY: CPT | Mod: GC | Performed by: OBSTETRICS & GYNECOLOGY

## 2025-05-22 PROCEDURE — 370N000017 HC ANESTHESIA TECHNICAL FEE, PER MIN: Performed by: OBSTETRICS & GYNECOLOGY

## 2025-05-22 PROCEDURE — 86780 TREPONEMA PALLIDUM: CPT

## 2025-05-22 PROCEDURE — 87653 STREP B DNA AMP PROBE: CPT

## 2025-05-22 PROCEDURE — 85049 AUTOMATED PLATELET COUNT: CPT

## 2025-05-22 PROCEDURE — 85018 HEMOGLOBIN: CPT

## 2025-05-22 PROCEDURE — 710N000010 HC RECOVERY PHASE 1, LEVEL 2, PER MIN: Performed by: OBSTETRICS & GYNECOLOGY

## 2025-05-22 PROCEDURE — 258N000003 HC RX IP 258 OP 636: Performed by: STUDENT IN AN ORGANIZED HEALTH CARE EDUCATION/TRAINING PROGRAM

## 2025-05-22 PROCEDURE — 88307 TISSUE EXAM BY PATHOLOGIST: CPT | Mod: TC

## 2025-05-22 PROCEDURE — 120N000002 HC R&B MED SURG/OB UMMC

## 2025-05-22 PROCEDURE — 85384 FIBRINOGEN ACTIVITY: CPT

## 2025-05-22 PROCEDURE — 250N000011 HC RX IP 250 OP 636: Performed by: OBSTETRICS & GYNECOLOGY

## 2025-05-22 PROCEDURE — 250N000011 HC RX IP 250 OP 636: Mod: JZ

## 2025-05-22 RX ORDER — CALCIUM GLUCONATE 98 MG/ML
1 INJECTION, SOLUTION INTRAVENOUS
Status: DISCONTINUED | OUTPATIENT
Start: 2025-05-22 | End: 2025-05-22

## 2025-05-22 RX ORDER — NALOXONE HYDROCHLORIDE 0.4 MG/ML
0.2 INJECTION, SOLUTION INTRAMUSCULAR; INTRAVENOUS; SUBCUTANEOUS
Status: DISCONTINUED | OUTPATIENT
Start: 2025-05-22 | End: 2025-05-22 | Stop reason: HOSPADM

## 2025-05-22 RX ORDER — MAGNESIUM SULFATE HEPTAHYDRATE 40 MG/ML
4 INJECTION, SOLUTION INTRAVENOUS ONCE
Status: COMPLETED | OUTPATIENT
Start: 2025-05-22 | End: 2025-05-22

## 2025-05-22 RX ORDER — MORPHINE SULFATE 1 MG/ML
150 INJECTION, SOLUTION EPIDURAL; INTRATHECAL; INTRAVENOUS ONCE
Status: DISCONTINUED | OUTPATIENT
Start: 2025-05-22 | End: 2025-05-22

## 2025-05-22 RX ORDER — PROCHLORPERAZINE MALEATE 10 MG
10 TABLET ORAL EVERY 6 HOURS PRN
Status: DISCONTINUED | OUTPATIENT
Start: 2025-05-22 | End: 2025-05-22

## 2025-05-22 RX ORDER — SIMETHICONE 80 MG
80 TABLET,CHEWABLE ORAL 4 TIMES DAILY PRN
Status: DISCONTINUED | OUTPATIENT
Start: 2025-05-22 | End: 2025-05-24 | Stop reason: HOSPADM

## 2025-05-22 RX ORDER — MISOPROSTOL 200 UG/1
400 TABLET ORAL
Status: DISCONTINUED | OUTPATIENT
Start: 2025-05-22 | End: 2025-05-24 | Stop reason: HOSPADM

## 2025-05-22 RX ORDER — SODIUM CHLORIDE, SODIUM LACTATE, POTASSIUM CHLORIDE, CALCIUM CHLORIDE 600; 310; 30; 20 MG/100ML; MG/100ML; MG/100ML; MG/100ML
INJECTION, SOLUTION INTRAVENOUS CONTINUOUS PRN
Status: DISCONTINUED | OUTPATIENT
Start: 2025-05-22 | End: 2025-05-22

## 2025-05-22 RX ORDER — FENTANYL CITRATE 50 UG/ML
15 INJECTION, SOLUTION INTRAMUSCULAR; INTRAVENOUS ONCE
Status: DISCONTINUED | OUTPATIENT
Start: 2025-05-22 | End: 2025-05-22

## 2025-05-22 RX ORDER — NALOXONE HYDROCHLORIDE 0.4 MG/ML
0.4 INJECTION, SOLUTION INTRAMUSCULAR; INTRAVENOUS; SUBCUTANEOUS
Status: DISCONTINUED | OUTPATIENT
Start: 2025-05-22 | End: 2025-05-24 | Stop reason: HOSPADM

## 2025-05-22 RX ORDER — OXYTOCIN 10 [USP'U]/ML
10 INJECTION, SOLUTION INTRAMUSCULAR; INTRAVENOUS
Status: DISCONTINUED | OUTPATIENT
Start: 2025-05-22 | End: 2025-05-24 | Stop reason: HOSPADM

## 2025-05-22 RX ORDER — ONDANSETRON 2 MG/ML
4 INJECTION INTRAMUSCULAR; INTRAVENOUS EVERY 6 HOURS PRN
Status: DISCONTINUED | OUTPATIENT
Start: 2025-05-22 | End: 2025-05-24 | Stop reason: HOSPADM

## 2025-05-22 RX ORDER — SODIUM CHLORIDE, SODIUM LACTATE, POTASSIUM CHLORIDE, CALCIUM CHLORIDE 600; 310; 30; 20 MG/100ML; MG/100ML; MG/100ML; MG/100ML
INJECTION, SOLUTION INTRAVENOUS CONTINUOUS
Status: DISCONTINUED | OUTPATIENT
Start: 2025-05-22 | End: 2025-05-22 | Stop reason: HOSPADM

## 2025-05-22 RX ORDER — METOCLOPRAMIDE HYDROCHLORIDE 5 MG/ML
10 INJECTION INTRAMUSCULAR; INTRAVENOUS EVERY 6 HOURS PRN
Status: DISCONTINUED | OUTPATIENT
Start: 2025-05-22 | End: 2025-05-24 | Stop reason: HOSPADM

## 2025-05-22 RX ORDER — AMOXICILLIN 250 MG
1 CAPSULE ORAL 2 TIMES DAILY
Status: DISCONTINUED | OUTPATIENT
Start: 2025-05-22 | End: 2025-05-24 | Stop reason: HOSPADM

## 2025-05-22 RX ORDER — KETOROLAC TROMETHAMINE 30 MG/ML
INJECTION, SOLUTION INTRAMUSCULAR; INTRAVENOUS PRN
Status: DISCONTINUED | OUTPATIENT
Start: 2025-05-22 | End: 2025-05-22

## 2025-05-22 RX ORDER — METHYLERGONOVINE MALEATE 0.2 MG/ML
200 INJECTION INTRAVENOUS
Status: DISCONTINUED | OUTPATIENT
Start: 2025-05-22 | End: 2025-05-24 | Stop reason: HOSPADM

## 2025-05-22 RX ORDER — CARBOPROST TROMETHAMINE 250 UG/ML
250 INJECTION, SOLUTION INTRAMUSCULAR
Status: DISCONTINUED | OUTPATIENT
Start: 2025-05-22 | End: 2025-05-22 | Stop reason: HOSPADM

## 2025-05-22 RX ORDER — FENTANYL CITRATE 50 UG/ML
25-50 INJECTION, SOLUTION INTRAMUSCULAR; INTRAVENOUS
Status: DISCONTINUED | OUTPATIENT
Start: 2025-05-22 | End: 2025-05-22 | Stop reason: HOSPADM

## 2025-05-22 RX ORDER — MISOPROSTOL 200 UG/1
800 TABLET ORAL
Status: DISCONTINUED | OUTPATIENT
Start: 2025-05-22 | End: 2025-05-24 | Stop reason: HOSPADM

## 2025-05-22 RX ORDER — ONDANSETRON 4 MG/1
4 TABLET, ORALLY DISINTEGRATING ORAL EVERY 6 HOURS PRN
Status: DISCONTINUED | OUTPATIENT
Start: 2025-05-22 | End: 2025-05-24 | Stop reason: HOSPADM

## 2025-05-22 RX ORDER — BISACODYL 10 MG
10 SUPPOSITORY, RECTAL RECTAL DAILY PRN
Status: DISCONTINUED | OUTPATIENT
Start: 2025-05-24 | End: 2025-05-24 | Stop reason: HOSPADM

## 2025-05-22 RX ORDER — OXYCODONE HYDROCHLORIDE 5 MG/1
10 TABLET ORAL
Status: DISCONTINUED | OUTPATIENT
Start: 2025-05-22 | End: 2025-05-22

## 2025-05-22 RX ORDER — LOPERAMIDE HYDROCHLORIDE 2 MG/1
2 CAPSULE ORAL
Status: DISCONTINUED | OUTPATIENT
Start: 2025-05-22 | End: 2025-05-24 | Stop reason: HOSPADM

## 2025-05-22 RX ORDER — OXYTOCIN 10 [USP'U]/ML
10 INJECTION, SOLUTION INTRAMUSCULAR; INTRAVENOUS
Status: DISCONTINUED | OUTPATIENT
Start: 2025-05-22 | End: 2025-05-22 | Stop reason: HOSPADM

## 2025-05-22 RX ORDER — ONDANSETRON 2 MG/ML
INJECTION INTRAMUSCULAR; INTRAVENOUS PRN
Status: DISCONTINUED | OUTPATIENT
Start: 2025-05-22 | End: 2025-05-22

## 2025-05-22 RX ORDER — OXYTOCIN/0.9 % SODIUM CHLORIDE 30/500 ML
340 PLASTIC BAG, INJECTION (ML) INTRAVENOUS CONTINUOUS PRN
Status: DISCONTINUED | OUTPATIENT
Start: 2025-05-22 | End: 2025-05-24 | Stop reason: HOSPADM

## 2025-05-22 RX ORDER — CARBOPROST TROMETHAMINE 250 UG/ML
250 INJECTION, SOLUTION INTRAMUSCULAR
Status: DISCONTINUED | OUTPATIENT
Start: 2025-05-22 | End: 2025-05-24 | Stop reason: HOSPADM

## 2025-05-22 RX ORDER — METHYLERGONOVINE MALEATE 0.2 MG/ML
200 INJECTION INTRAVENOUS
Status: DISCONTINUED | OUTPATIENT
Start: 2025-05-22 | End: 2025-05-22 | Stop reason: HOSPADM

## 2025-05-22 RX ORDER — TRANEXAMIC ACID 10 MG/ML
1 INJECTION, SOLUTION INTRAVENOUS EVERY 30 MIN PRN
Status: DISCONTINUED | OUTPATIENT
Start: 2025-05-22 | End: 2025-05-22 | Stop reason: HOSPADM

## 2025-05-22 RX ORDER — ONDANSETRON 2 MG/ML
4 INJECTION INTRAMUSCULAR; INTRAVENOUS EVERY 6 HOURS PRN
Status: DISCONTINUED | OUTPATIENT
Start: 2025-05-22 | End: 2025-05-22

## 2025-05-22 RX ORDER — METOCLOPRAMIDE HYDROCHLORIDE 5 MG/ML
10 INJECTION INTRAMUSCULAR; INTRAVENOUS EVERY 6 HOURS PRN
Status: DISCONTINUED | OUTPATIENT
Start: 2025-05-22 | End: 2025-05-22

## 2025-05-22 RX ORDER — HYDROCORTISONE 25 MG/G
CREAM TOPICAL 3 TIMES DAILY PRN
Status: DISCONTINUED | OUTPATIENT
Start: 2025-05-22 | End: 2025-05-24 | Stop reason: HOSPADM

## 2025-05-22 RX ORDER — FENTANYL CITRATE-0.9 % NACL/PF 10 MCG/ML
100 PLASTIC BAG, INJECTION (ML) INTRAVENOUS EVERY 5 MIN PRN
Status: DISCONTINUED | OUTPATIENT
Start: 2025-05-22 | End: 2025-05-22

## 2025-05-22 RX ORDER — NALOXONE HYDROCHLORIDE 0.4 MG/ML
0.4 INJECTION, SOLUTION INTRAMUSCULAR; INTRAVENOUS; SUBCUTANEOUS
Status: DISCONTINUED | OUTPATIENT
Start: 2025-05-22 | End: 2025-05-22 | Stop reason: HOSPADM

## 2025-05-22 RX ORDER — MISOPROSTOL 200 UG/1
800 TABLET ORAL
Status: DISCONTINUED | OUTPATIENT
Start: 2025-05-22 | End: 2025-05-22 | Stop reason: HOSPADM

## 2025-05-22 RX ORDER — LIDOCAINE 40 MG/G
CREAM TOPICAL
Status: DISCONTINUED | OUTPATIENT
Start: 2025-05-22 | End: 2025-05-22 | Stop reason: HOSPADM

## 2025-05-22 RX ORDER — MISOPROSTOL 200 UG/1
TABLET ORAL
Status: COMPLETED
Start: 2025-05-22 | End: 2025-05-22

## 2025-05-22 RX ORDER — DEXAMETHASONE SODIUM PHOSPHATE 4 MG/ML
4 INJECTION, SOLUTION INTRA-ARTICULAR; INTRALESIONAL; INTRAMUSCULAR; INTRAVENOUS; SOFT TISSUE
Status: DISCONTINUED | OUTPATIENT
Start: 2025-05-22 | End: 2025-05-22

## 2025-05-22 RX ORDER — MORPHINE SULFATE 1 MG/ML
INJECTION, SOLUTION EPIDURAL; INTRATHECAL; INTRAVENOUS
Status: COMPLETED | OUTPATIENT
Start: 2025-05-22 | End: 2025-05-22

## 2025-05-22 RX ORDER — PROCHLORPERAZINE MALEATE 10 MG
10 TABLET ORAL EVERY 6 HOURS PRN
Status: DISCONTINUED | OUTPATIENT
Start: 2025-05-22 | End: 2025-05-24 | Stop reason: HOSPADM

## 2025-05-22 RX ORDER — ACETAMINOPHEN 325 MG/1
975 TABLET ORAL ONCE
Status: DISCONTINUED | OUTPATIENT
Start: 2025-05-22 | End: 2025-05-22 | Stop reason: HOSPADM

## 2025-05-22 RX ORDER — CITRIC ACID/SODIUM CITRATE 334-500MG
30 SOLUTION, ORAL ORAL
Status: DISCONTINUED | OUTPATIENT
Start: 2025-05-22 | End: 2025-05-22 | Stop reason: HOSPADM

## 2025-05-22 RX ORDER — METOCLOPRAMIDE 10 MG/1
10 TABLET ORAL EVERY 6 HOURS PRN
Status: DISCONTINUED | OUTPATIENT
Start: 2025-05-22 | End: 2025-05-24 | Stop reason: HOSPADM

## 2025-05-22 RX ORDER — KETOROLAC TROMETHAMINE 15 MG/ML
15 INJECTION, SOLUTION INTRAMUSCULAR; INTRAVENOUS EVERY 6 HOURS
Status: COMPLETED | OUTPATIENT
Start: 2025-05-22 | End: 2025-05-23

## 2025-05-22 RX ORDER — TRANEXAMIC ACID 10 MG/ML
INJECTION, SOLUTION INTRAVENOUS
Status: COMPLETED
Start: 2025-05-22 | End: 2025-05-22

## 2025-05-22 RX ORDER — ONDANSETRON 4 MG/1
4 TABLET, ORALLY DISINTEGRATING ORAL EVERY 30 MIN PRN
Status: DISCONTINUED | OUTPATIENT
Start: 2025-05-22 | End: 2025-05-22

## 2025-05-22 RX ORDER — CEFAZOLIN SODIUM/WATER 2 G/20 ML
2 SYRINGE (ML) INTRAVENOUS
Status: DISCONTINUED | OUTPATIENT
Start: 2025-05-22 | End: 2025-05-22 | Stop reason: HOSPADM

## 2025-05-22 RX ORDER — NALOXONE HYDROCHLORIDE 0.4 MG/ML
0.2 INJECTION, SOLUTION INTRAMUSCULAR; INTRAVENOUS; SUBCUTANEOUS
Status: DISCONTINUED | OUTPATIENT
Start: 2025-05-22 | End: 2025-05-24 | Stop reason: HOSPADM

## 2025-05-22 RX ORDER — OXYCODONE HYDROCHLORIDE 5 MG/1
5 TABLET ORAL EVERY 4 HOURS PRN
Status: DISCONTINUED | OUTPATIENT
Start: 2025-05-22 | End: 2025-05-24 | Stop reason: HOSPADM

## 2025-05-22 RX ORDER — LIDOCAINE 40 MG/G
CREAM TOPICAL
Status: DISCONTINUED | OUTPATIENT
Start: 2025-05-22 | End: 2025-05-24 | Stop reason: HOSPADM

## 2025-05-22 RX ORDER — OXYTOCIN/0.9 % SODIUM CHLORIDE 30/500 ML
340 PLASTIC BAG, INJECTION (ML) INTRAVENOUS CONTINUOUS PRN
Status: DISCONTINUED | OUTPATIENT
Start: 2025-05-22 | End: 2025-05-22 | Stop reason: HOSPADM

## 2025-05-22 RX ORDER — BUPIVACAINE HYDROCHLORIDE 2.5 MG/ML
INJECTION, SOLUTION EPIDURAL; INFILTRATION; INTRACAUDAL; PERINEURAL
Status: DISCONTINUED | OUTPATIENT
Start: 2025-05-22 | End: 2025-05-22

## 2025-05-22 RX ORDER — OXYTOCIN 10 [USP'U]/ML
10 INJECTION, SOLUTION INTRAMUSCULAR; INTRAVENOUS
Status: DISCONTINUED | OUTPATIENT
Start: 2025-05-22 | End: 2025-05-22

## 2025-05-22 RX ORDER — BUPIVACAINE HYDROCHLORIDE 7.5 MG/ML
INJECTION, SOLUTION INTRASPINAL
Status: COMPLETED | OUTPATIENT
Start: 2025-05-22 | End: 2025-05-22

## 2025-05-22 RX ORDER — MAGNESIUM SULFATE HEPTAHYDRATE 40 MG/ML
2 INJECTION, SOLUTION INTRAVENOUS ONCE
Status: COMPLETED | OUTPATIENT
Start: 2025-05-22 | End: 2025-05-22

## 2025-05-22 RX ORDER — ACETAMINOPHEN 325 MG/1
975 TABLET ORAL ONCE
Status: COMPLETED | OUTPATIENT
Start: 2025-05-22 | End: 2025-05-22

## 2025-05-22 RX ORDER — TRANEXAMIC ACID 10 MG/ML
1 INJECTION, SOLUTION INTRAVENOUS ONCE
Status: COMPLETED | OUTPATIENT
Start: 2025-05-22 | End: 2025-05-22

## 2025-05-22 RX ORDER — ONDANSETRON 2 MG/ML
4 INJECTION INTRAMUSCULAR; INTRAVENOUS EVERY 30 MIN PRN
Status: DISCONTINUED | OUTPATIENT
Start: 2025-05-22 | End: 2025-05-22

## 2025-05-22 RX ORDER — AMOXICILLIN 250 MG
2 CAPSULE ORAL 2 TIMES DAILY
Status: DISCONTINUED | OUTPATIENT
Start: 2025-05-22 | End: 2025-05-24 | Stop reason: HOSPADM

## 2025-05-22 RX ORDER — MAGNESIUM SULFATE IN WATER 40 MG/ML
2 INJECTION, SOLUTION INTRAVENOUS CONTINUOUS
Status: DISCONTINUED | OUTPATIENT
Start: 2025-05-22 | End: 2025-05-22

## 2025-05-22 RX ORDER — METOCLOPRAMIDE 10 MG/1
10 TABLET ORAL EVERY 6 HOURS PRN
Status: DISCONTINUED | OUTPATIENT
Start: 2025-05-22 | End: 2025-05-22

## 2025-05-22 RX ORDER — CEFAZOLIN SODIUM/WATER 2 G/20 ML
SYRINGE (ML) INTRAVENOUS PRN
Status: DISCONTINUED | OUTPATIENT
Start: 2025-05-22 | End: 2025-05-22

## 2025-05-22 RX ORDER — CEFAZOLIN SODIUM/WATER 2 G/20 ML
2 SYRINGE (ML) INTRAVENOUS SEE ADMIN INSTRUCTIONS
Status: DISCONTINUED | OUTPATIENT
Start: 2025-05-22 | End: 2025-05-22 | Stop reason: HOSPADM

## 2025-05-22 RX ORDER — OXYCODONE HYDROCHLORIDE 5 MG/1
5 TABLET ORAL
Status: DISCONTINUED | OUTPATIENT
Start: 2025-05-22 | End: 2025-05-22

## 2025-05-22 RX ORDER — OXYTOCIN/0.9 % SODIUM CHLORIDE 30/500 ML
PLASTIC BAG, INJECTION (ML) INTRAVENOUS CONTINUOUS PRN
Status: DISCONTINUED | OUTPATIENT
Start: 2025-05-22 | End: 2025-05-22

## 2025-05-22 RX ORDER — NALBUPHINE HYDROCHLORIDE 10 MG/ML
2.5-5 INJECTION INTRAMUSCULAR; INTRAVENOUS; SUBCUTANEOUS EVERY 6 HOURS PRN
Status: DISCONTINUED | OUTPATIENT
Start: 2025-05-22 | End: 2025-05-22

## 2025-05-22 RX ORDER — IBUPROFEN 800 MG/1
800 TABLET, FILM COATED ORAL EVERY 6 HOURS
Status: DISCONTINUED | OUTPATIENT
Start: 2025-05-23 | End: 2025-05-24 | Stop reason: HOSPADM

## 2025-05-22 RX ORDER — NALOXONE HYDROCHLORIDE 0.4 MG/ML
0.1 INJECTION, SOLUTION INTRAMUSCULAR; INTRAVENOUS; SUBCUTANEOUS
Status: DISCONTINUED | OUTPATIENT
Start: 2025-05-22 | End: 2025-05-22

## 2025-05-22 RX ORDER — FENTANYL CITRATE 50 UG/ML
INJECTION, SOLUTION INTRAMUSCULAR; INTRAVENOUS
Status: COMPLETED | OUTPATIENT
Start: 2025-05-22 | End: 2025-05-22

## 2025-05-22 RX ORDER — ONDANSETRON 4 MG/1
4 TABLET, ORALLY DISINTEGRATING ORAL EVERY 6 HOURS PRN
Status: DISCONTINUED | OUTPATIENT
Start: 2025-05-22 | End: 2025-05-22

## 2025-05-22 RX ORDER — LOPERAMIDE HYDROCHLORIDE 2 MG/1
2 CAPSULE ORAL
Status: DISCONTINUED | OUTPATIENT
Start: 2025-05-22 | End: 2025-05-22 | Stop reason: HOSPADM

## 2025-05-22 RX ORDER — TRANEXAMIC ACID 10 MG/ML
1 INJECTION, SOLUTION INTRAVENOUS EVERY 30 MIN PRN
Status: DISCONTINUED | OUTPATIENT
Start: 2025-05-22 | End: 2025-05-24 | Stop reason: HOSPADM

## 2025-05-22 RX ORDER — DIPHENHYDRAMINE HCL 25 MG
25 CAPSULE ORAL EVERY 6 HOURS PRN
Status: DISCONTINUED | OUTPATIENT
Start: 2025-05-22 | End: 2025-05-24 | Stop reason: HOSPADM

## 2025-05-22 RX ORDER — SODIUM PHOSPHATE,MONO-DIBASIC 19G-7G/118
1 ENEMA (ML) RECTAL DAILY PRN
Status: DISCONTINUED | OUTPATIENT
Start: 2025-05-24 | End: 2025-05-24 | Stop reason: HOSPADM

## 2025-05-22 RX ORDER — METHYLERGONOVINE MALEATE 0.2 MG/ML
INJECTION INTRAVENOUS
Status: COMPLETED
Start: 2025-05-22 | End: 2025-05-22

## 2025-05-22 RX ORDER — DEXTROSE, SODIUM CHLORIDE, SODIUM LACTATE, POTASSIUM CHLORIDE, AND CALCIUM CHLORIDE 5; .6; .31; .03; .02 G/100ML; G/100ML; G/100ML; G/100ML; G/100ML
INJECTION, SOLUTION INTRAVENOUS CONTINUOUS
Status: DISCONTINUED | OUTPATIENT
Start: 2025-05-22 | End: 2025-05-24 | Stop reason: HOSPADM

## 2025-05-22 RX ORDER — LOPERAMIDE HYDROCHLORIDE 2 MG/1
4 CAPSULE ORAL
Status: DISCONTINUED | OUTPATIENT
Start: 2025-05-22 | End: 2025-05-24 | Stop reason: HOSPADM

## 2025-05-22 RX ORDER — LOPERAMIDE HYDROCHLORIDE 2 MG/1
4 CAPSULE ORAL
Status: DISCONTINUED | OUTPATIENT
Start: 2025-05-22 | End: 2025-05-22 | Stop reason: HOSPADM

## 2025-05-22 RX ORDER — ACETAMINOPHEN 325 MG/1
975 TABLET ORAL EVERY 6 HOURS
Status: DISCONTINUED | OUTPATIENT
Start: 2025-05-22 | End: 2025-05-24 | Stop reason: HOSPADM

## 2025-05-22 RX ORDER — DIPHENHYDRAMINE HYDROCHLORIDE 50 MG/ML
25 INJECTION, SOLUTION INTRAMUSCULAR; INTRAVENOUS EVERY 6 HOURS PRN
Status: DISCONTINUED | OUTPATIENT
Start: 2025-05-22 | End: 2025-05-24 | Stop reason: HOSPADM

## 2025-05-22 RX ORDER — OXYTOCIN/0.9 % SODIUM CHLORIDE 30/500 ML
100-340 PLASTIC BAG, INJECTION (ML) INTRAVENOUS CONTINUOUS PRN
Status: DISCONTINUED | OUTPATIENT
Start: 2025-05-22 | End: 2025-05-22

## 2025-05-22 RX ORDER — FLUMAZENIL 0.1 MG/ML
0.2 INJECTION, SOLUTION INTRAVENOUS
Status: DISCONTINUED | OUTPATIENT
Start: 2025-05-22 | End: 2025-05-22 | Stop reason: HOSPADM

## 2025-05-22 RX ORDER — MISOPROSTOL 200 UG/1
400 TABLET ORAL
Status: DISCONTINUED | OUTPATIENT
Start: 2025-05-22 | End: 2025-05-22 | Stop reason: HOSPADM

## 2025-05-22 RX ADMIN — MAGNESIUM SULFATE HEPTAHYDRATE 2 G/HR: 40 INJECTION, SOLUTION INTRAVENOUS at 11:49

## 2025-05-22 RX ADMIN — MAGNESIUM SULFATE HEPTAHYDRATE 2 G: 40 INJECTION, SOLUTION INTRAVENOUS at 11:36

## 2025-05-22 RX ADMIN — FENTANYL CITRATE 15 MCG: 50 INJECTION INTRAMUSCULAR; INTRAVENOUS at 13:01

## 2025-05-22 RX ADMIN — ACETAMINOPHEN 975 MG: 325 TABLET ORAL at 10:35

## 2025-05-22 RX ADMIN — PHENYLEPHRINE HYDROCHLORIDE 50 MCG/KG/MIN: 10 INJECTION INTRAVENOUS at 13:01

## 2025-05-22 RX ADMIN — MORPHINE SULFATE 0.15 MG: 1 INJECTION EPIDURAL; INTRATHECAL; INTRAVENOUS at 13:01

## 2025-05-22 RX ADMIN — ONDANSETRON 4 MG: 2 INJECTION INTRAMUSCULAR; INTRAVENOUS at 12:50

## 2025-05-22 RX ADMIN — SODIUM CHLORIDE, SODIUM LACTATE, POTASSIUM CHLORIDE, AND CALCIUM CHLORIDE: .6; .31; .03; .02 INJECTION, SOLUTION INTRAVENOUS at 12:23

## 2025-05-22 RX ADMIN — SENNOSIDES AND DOCUSATE SODIUM 2 TABLET: 50; 8.6 TABLET ORAL at 20:33

## 2025-05-22 RX ADMIN — BUPIVACAINE HYDROCHLORIDE IN DEXTROSE 1.8 ML: 7.5 INJECTION, SOLUTION SUBARACHNOID at 13:01

## 2025-05-22 RX ADMIN — SODIUM CHLORIDE, SODIUM LACTATE, POTASSIUM CHLORIDE, AND CALCIUM CHLORIDE: .6; .31; .03; .02 INJECTION, SOLUTION INTRAVENOUS at 11:11

## 2025-05-22 RX ADMIN — BUPIVACAINE 20 ML: 13.3 INJECTION, SUSPENSION, LIPOSOMAL INFILTRATION at 14:50

## 2025-05-22 RX ADMIN — BUPIVACAINE HYDROCHLORIDE 20 ML: 2.5 INJECTION, SOLUTION EPIDURAL; INFILTRATION; INTRACAUDAL at 14:50

## 2025-05-22 RX ADMIN — MISOPROSTOL 800 MCG: 200 TABLET ORAL at 15:46

## 2025-05-22 RX ADMIN — TRANEXAMIC ACID 1 G: 10 INJECTION, SOLUTION INTRAVENOUS at 17:02

## 2025-05-22 RX ADMIN — Medication 600 ML/HR: at 13:31

## 2025-05-22 RX ADMIN — ACETAMINOPHEN 975 MG: 325 TABLET, FILM COATED ORAL at 20:33

## 2025-05-22 RX ADMIN — TRANEXAMIC ACID 1 G: 1 INJECTION, SOLUTION INTRAVENOUS at 13:59

## 2025-05-22 RX ADMIN — METHYLERGONOVINE MALEATE 200 MCG: 0.2 INJECTION INTRAVENOUS at 15:38

## 2025-05-22 RX ADMIN — Medication 2 G: at 12:45

## 2025-05-22 RX ADMIN — KETOROLAC TROMETHAMINE 30 MG: 30 INJECTION, SOLUTION INTRAMUSCULAR at 14:31

## 2025-05-22 RX ADMIN — MAGNESIUM SULFATE HEPTAHYDRATE 4 G: 40 INJECTION, SOLUTION INTRAVENOUS at 11:12

## 2025-05-22 ASSESSMENT — ACTIVITIES OF DAILY LIVING (ADL)
ADLS_ACUITY_SCORE: 32

## 2025-05-22 NOTE — CONSULTS
_       [facility]                      Neonatology Advanced Practice Antepartum Counseling Consult    I was asked to provide antepartum counseling for Malena Thomas at the request of Karma Fields MD secondary to prematurity. Ms. Thomas is currently 28 5/7 weeks and has a hx significant for absent end diastolic flow. Betamethasone was administered on -. Ms. Thomas, was counseled on the expected hospital course, potential risks, and outcomes associated with an infant born at approximately 28 weeks gestation. The counseling included: morbidity, mortality, initial delivery room stabilization, respiratory course, lung development, RDS, patent ductus arteriosus, retinopathy of prematurity, hyperbilirubinemia, hemodynamic support, infection (including NEC), intraventricular hemorrhage, nutrition, growth and development, and long term outcomes. Please feel free to call with any additional questions or concerns.          Talia Veliz CNP, DNP 2025 8:27 PM   Advanced Practice Service    Intensive Care Unit  [facility]      Floor Time (min): 15  Face to Face Time (min): 40  Total Time (minutes): 55  More than 50% of my time was spent in direct, face to face, antepartum counseling with the above patient.

## 2025-05-22 NOTE — OP NOTE
[facility]  Operative Note     Surgery Date:  2025  Surgeon:  Rayne Becerra MD  Assistants:  Whitney Wilson MD, PGY-3    Pre-op Diagnosis:    - Intrauterine pregnancy at 28w6d  - Fetal growth restriction  - Fetal intermittent reversed end diastolic flow, non-reassuring fetal status  - History of PPROM, placental abruption with fetal demise (25w5d)  - History of preeclampsia w/ severe features  - History of HELLP syndrome, DIC  - History of severe fetal growth restriction  - History of  section x 1  - Late entry to prenatal care       Post-op Diagnosis:    - Same   - Liveborn male infant     Procedure:    - Repeat low-transverse  section with double layer uterine closure via Pfannenstiel incision    Anesthesia:  Combined spinal epidural  QBL:    449 mL  IVF:    1000 mL crystalloid  UOP:    250 mL clear yellow urine at the end of the case  Drains:   John Catheter   Specimens:   Routine cord blood/segment, placenta  Antibiotics:  2g Ancef  Additional medications: 1g TXA IV  Complications:  None    Indications:   Malean Thomas is a 34 year old  at 28w6d admitted for extended fetal monitoring and betamethasone administration due to new findings of intermittent reversed end diastolic flow on umbilical artery dopplers as well as non-reassuring fetal status on NST. . The risks, benefits, and alternatives of  section were discussed with the patient, and she agreed to proceed.     Findings:   Moderate subcutaneous scarring, moderate rectofascial adhesions, no intraabdominal adhesions, filmy adhesions between bladder and lower uterine segment.   Clear amniotic fluid  Liveborn male infant in cephalic LOT presentation. Born at 1330 on 25. A true knot was present in the umbilical cord. No nuchal cord. Apgars 6 at 1 minute & 8 at 5 minutes. Weight 920g.  Very thin lower uterine segment. Otherwise normal uterus, fallopian tubes, and ovaries.   Cord gasses  below     Latest Reference Range & Units 05/22/25 13:35   Ph Cord Arterial 7.16 - 7.39  7.18   PCO2 Cord Arterial 35 - 71 mm Hg 57   PO2 Cord Arterial 10 - 33 mm Hg 12   Bicarbonate Cord Arterial 16 - 24 mmol/L 21   Base Excess/Deficit >-10.0 - -2.0 mmol/L -8.0   Ph Cord Blood Venous 7.21 - 7.45  7.20 (L)   PCO2 Cord Venous 27 - 57 mm Hg 58 (H)   PO2 Cord Venous 21 - 37 mm Hg 13 (L)   Bicarbonate Cord Venous 16 - 24 mmol/L 22   Base Excess/Deficit Cord Venous >-10.0 - -2.0 mmol/L -6.7     Procedure Details:   The patient was brought to the OR, where adequate combined spinal-epidural anesthesia was administered. She was placed in the dorsal supine position with a slight leftward tilt. A brasher catheter was placed. She was prepped and draped in the usual sterile fashion. A surgical time out was performed. A pfannenstiel skin incision was made with the scalpel through her prior incision, and carried down to the underlying fascia with sharp and blunt dissection. The fascia was incised in the midline, and the incision was extended laterally with the Cochran scissors. The superior aspect of the fascia was grasped with the Kocher clamps and dissected off of the underlying rectus muscles with blunt and sharp dissection. Attention was then turned to the inferior aspect of the fascia, which was similarly dissected off of the underlying rectus muscles. The rectus muscles were  in the midline, and the peritoneum was entered bluntly, and the opening was extended with digital pressure and electrocautery. The bladder blade was placed. The vesicouterine peritoneum was incised in the midline, and the incision was extended laterally with the Metzenbaum scissors. A bladder flap was created digitally and the bladder blade was replaced. A transverse hysterotomy was made with the scalpel in the lower uterine segment, and the incision was extended with digital pressure. The infant was noted to be in the cephalic position, and was  delivered atraumatically. The shoulders delivered easily. No nuchal cord was noted. The infant was placed in a warming bag, the cord was doubly clamped and cut after 30 seconds, and the infant was handed off to the awaiting NICU staff. A segment of cord was cut and sent to lab. Cord gasses were collected. The placenta was delivered with gentle traction on the umbilical cord and uterine massage. The placenta was noted to be intact. The uterus was exteriorized and cleared of all clots and debris. Uterine tone was noted to be adequate with 30 units of pitocin given through the running IV and uterine massage. The hysterotomy was closed with a running locked suture of 0 Vicryl. The hysterotomy was then imbricated using an 0 Monocryl suture. The hysterotomy was noted to be hemostatic. The posterior cul-de-sac was cleared of all clots and debris. The uterus was returned to the abdomen. The pericolic gutters were cleared of all clots and debris. The hysterotomy was reexamined and noted to be hemostatic. The fascia and rectus muscles were examined and areas of oozing were controlled with electrocautery. The fascia was closed with a running 0 Vicryl suture. The subcutaneous tissue was irrigated and areas of oozing were controlled with electrocautery. The subcutaneous tissue was greater than 2cm in thickness, and was therefore closed with a running stitch using a 3-0 Plain suture. The skin was closed with a running subcuticular 4-0 Monocryl suture and covered with a sterile dressing.    All sponge, needle, and instrument counts were correct. The patient tolerated the procedure well, and was transferred to recovery in stable condition. Dr. Becerra was present and scrubbed for the procedure.     Whitney Wilson MD  Obstetrics & Gynecology, PGY-3  05/22/2025 2:50 PM

## 2025-05-22 NOTE — PROVIDER NOTIFICATION
05/22/25 0730   Provider Notification   Provider Name/Title Dr Becerra   Method of Notification Electronic Page   Request Evaluate - Remote   Notification Reason Decels;Status Update     Prolonged deceleration, patient repositioned. Tracing discussed with provider. Ok to wait for scheduled c section time at 12:30. Patient updated.

## 2025-05-22 NOTE — ANESTHESIA CARE TRANSFER NOTE
Patient: Malena Thomas    Procedure: Procedure(s):   section       Diagnosis: Delivery by classical  section [O82]  Diagnosis Additional Information: No value filed.    Anesthesia Type:   Spinal     Note:    Oropharynx: spontaneously breathing  Level of Consciousness: awake  Oxygen Supplementation: room air    Independent Airway: airway patency satisfactory and stable  Dentition: dentition unchanged  Vital Signs Stable: post-procedure vital signs reviewed and stable  Report to RN Given: handoff report given  Patient transferred to: Labor and Delivery    Handoff Report: Identifed the Patient, Identified the Reponsible Provider, Reviewed the pertinent medical history, Discussed the surgical course, Reviewed Intra-OP anesthesia mangement and issues during anesthesia, Set expectations for post-procedure period and Allowed opportunity for questions and acknowledgement of understanding      Vitals:  Vitals Value Taken Time   BP     Temp     Pulse     Resp     SpO2         Electronically Signed By: Gerardo WORTHY Ma, MD  May 22, 2025  2:59 PM

## 2025-05-22 NOTE — PROVIDER NOTIFICATION
05/22/25 1575   Provider Notification   Provider Name/Title Dr Townsend   Method of Notification Phone   Request Evaluate - Remote   Notification Reason Bleeding     QBL and medications discussed,Vital signs reviewed. Plan for labs to be resulted prior to transfer to [facility] d/t patients hx of DIC per Dr. Townsend.

## 2025-05-22 NOTE — PROVIDER NOTIFICATION
05/21/25 1937   Provider Notification   Provider Name/Title Ajit PETERS   Method of Notification Electronic Page   Notification Reason Other (Comment)     Malena asking to shower, had decel at 1855 so planning to keep on monitor longer. Would 8 pm be ok for pre op shower if no other decels noted?

## 2025-05-22 NOTE — PROGRESS NOTES
Brief Interval Progress Note     MD to bedside for evaluation of bleeding following CS. Since delivery patient has had additional 600cc blood and clot out per vagina. She received TXA intraopertively. On exam fundus firm and 5-6cm below umbilicus, clot and trickle expressed. Methergine and MS misoprostol administered. On reassessment after ~20 minutes patient with continued clot/trickle expressed. Bedside ultreasound performed which showed thin endometrial stirp and no obvious clot burden in uterus or lower uterine segment. On repeat fundal examination small trickle but bleeding significantly improved. Plan for additional dose of TXA (last 1400)  and remain in PACU for observation. If she has recurrence of large clot expressed or ongoing trickle, will plan for IRAIDA sweep and Tiffanie placement. Discussed this with patient. In interim will continue to monitor vitals and bleeding with q15 min fundal checks. Updated EBL 1094.     Souleymane Staley MD   Obstetrics & Gynecology, PGY-2  05/22/2025 4:58 PM

## 2025-05-22 NOTE — ANESTHESIA PROCEDURE NOTES
TAP Procedure Note    Pre-Procedure   Staff -        Anesthesiologist:  Luna Cabello MD       Resident/Fellow: Gerardo Moy MD       Performed By: resident and with residents       Procedure performed by resident/fellow/CRNA in presence of a teaching physician.         Location: OB       Procedure Start/Stop Times: 5/22/2025 2:40 PM and 5/22/2025 2:51 PM       Pre-Anesthestic Checklist: patient identified, IV checked, site marked, risks and benefits discussed, informed consent, monitors and equipment checked, pre-op evaluation, at physician/surgeon's request and post-op pain management  Timeout:       Correct Patient: Yes        Correct Procedure: Yes        Correct Site: Yes        Correct Position: Yes        Correct Laterality: Yes        Site Marked: Yes  Procedure Documentation  Procedure: TAP         Laterality: bilateral       Patient Position: supine       Skin prep: Chloraprep       Insertion Site: T9-10.       Needle Type: insulated and short bevel       Needle Gauge: 20.        Needle Length (millimeters): 110        Ultrasound guided       1. Ultrasound was used to identify targeted nerve, plexus, vascular marker, or fascial plane and place a needle adjacent to it in real-time.       2. Ultrasound was used to visualize the spread of anesthetic in close proximity to the above referenced structure.       3. A permanent image is entered into the patient's record.       4. The visualized anatomic structures appeared normal.       5. There were no apparent abnormal pathologic findings.    Assessment/Narrative         The placement was negative for: blood aspirated, painful injection and site bleeding       Paresthesias: No.       Insertion/Infusion Method: Single Shot       Complications: none       Injection made incrementally with aspirations every 5 mL.    Medication(s) Administered   Bupivacaine 0.25% PF (Infiltration) - Infiltration   20 mL - 5/22/2025 2:50:00 PM  Bupivacaine liposome  "(Exparel) 1.3% LA inj susp (Infiltration) - Infiltration   20 mL - 5/22/2025 2:50:00 PM  Medication Administration Time: 5/22/2025 2:40 PM      FOR [facility] [place] ONLY:   Pain Team Contact information: please page the Pain Team Via wedgies. Search \"Pain\". During daytime hours, please page the attending first. At night please page the resident first.      "

## 2025-05-22 NOTE — PROVIDER NOTIFICATION
05/22/25 1202   Vital Signs   BP (!) 154/81     Dr Becerra notified in OR prior to c section about single elevated BP.

## 2025-05-22 NOTE — PROVIDER NOTIFICATION
Noting increased bleeding and clots prior 2 checks in PACU, Dr Staley and Dr Townsend notified. Methergine given, miso. given, pitocin turned up to 340 ml. QBL 1200 ml. Vitals stable, no s/s of hypotension noted.     Addend\um 1624- noting additional clots and bleeding. Dr. Stern to bedside for US. Not clot noted on US. Scant bleeding from vagina. Will continue to monitor in PACU, Tiffanie at bedside. Vitals stable.

## 2025-05-22 NOTE — PROGRESS NOTES
Repeat  delivery of baby boy at 1330 in OR1. NICU in attendance due to prematurity. See delivery summary for further details.

## 2025-05-22 NOTE — PROVIDER NOTIFICATION
"   05/22/25 0848   Provider Notification   Provider Name/Title Dr Townsend   Method of Notification In Department   Request Evaluate - Remote   Notification Reason Other (Comment)     Patient asking to remove monitors and take shower prior to surgery. Per Dr. Wilson patient must remain on continuous. Malena would prefer not to have an in person . She states, \" I am too shy.\" Writer explaining that  would stay near patients head and her privacy would be maintained. Malena continues to state, \"please no, use the ipad.\"  updated on patient wishes.     Addendum 1050- Plan for in person  to be available in unit but to use ipad as patient desires.   "

## 2025-05-22 NOTE — ANESTHESIA PROCEDURE NOTES
Dural puncture epidural Procedure Note    Pre-Procedure   Staff -        Anesthesiologist:  Luna Cabello MD       Resident/Fellow: Gerardo Moy MD       Other Anesthesia Staff: Ibeth Eden MD       Performed By: anesthesiologist, resident and with residents       Procedure performed by resident/fellow/CRNA in presence of a teaching physician.         Location: OB       Procedure Start/Stop Times: 5/22/2025 12:30 PM and 5/22/2025 1:05 PM       Pre-Anesthestic Checklist: patient identified, IV checked, risks and benefits discussed, informed consent, monitors and equipment checked, pre-op evaluation, at physician/surgeon's request and post-op pain management  Timeout:       Correct Patient: Yes        Correct Procedure: Yes        Correct Site: Yes        Correct Position: Yes   Procedure Documentation  Procedure: dural puncture epidural         Patient Position: sitting       Skin prep: Chloraprep       Local skin infiltrated with 2 mL of 1% lidocaine.        Insertion Site: L3-4. (midline approach).       Technique: LORT saline        HELIO at 9 cm.       Needle Type: PanGenX       Needle Gauge: 17.        Needle Length (Inches): 3.5        Spinal Needle Type: Pencan       Spinal Needle (gauge): 25        Spinal Needle Length (inches): 4.69       Catheter: 19 G.          Catheter threaded easily.         6 cm epidural space.         Threaded 15 cm at skin.         # of attempts: 3 and  # of redirects:  3    Assessment/Narrative         Paresthesias: No.       Test dose of 3 mL lidocaine 1.5% w/ 1:200,000 epinephrine at.         Test dose negative, 3 minutes after injection, for signs of intravascular, subdural, or intrathecal injection.       Insertion/Infusion Method: LORT saline       Aspiration negative for Heme or CSF via Epidural Catheter.       CSF fluid: with Spinal needle.CSF fluid removed: with Epidural needle - not with Epidural needle.    Medication(s) Administered   0.75% Hyperbaric  "Bupivacaine (Intrathecal) - Intrathecal   1.8 mL - 5/22/2025 1:01:00 PM  Fentanyl PF (Intrathecal) - Intrathecal   15 mcg - 5/22/2025 1:01:00 PM  Morphine PF 1 mg/mL (Intrathecal) - Intrathecal   0.15 mg - 5/22/2025 1:01:00 PM  Medication Administration Time: 5/22/2025 12:30 PM     Comments:  Epinephrine 100mcg was also given through the spinal.      FOR [facility] ([facility]) ONLY:   Pain Team Contact information: please page the Pain Team Via [employer]. Search \"Pain\". During daytime hours, please page the attending first. At night please page the resident first.      "

## 2025-05-22 NOTE — PLAN OF CARE
Goal Outcome Evaluation:      Plan of Care Reviewed With: patient, family      Care relinquished to RICHARD Ball RN. Bleeding stable, labs stable- ok for transfer per Dr. Wilson. Plan for transfer to [facility] post NICU visit.

## 2025-05-22 NOTE — PLAN OF CARE
Talia HOOKER from NICU is bedside at this time speaking to Malena via telephone , Berenice [phone], regarding NICU admit tomorrow following surgery. Malena has been quiet, is nervous about delivery tomorrow and nicu admit for baby. Malena wants to shower this evening, waiting for NICU consult to be completed and then will complete pre op shower tonight and resume continuous monitoring. BMZ course has been completed as of 1951 tonight.

## 2025-05-22 NOTE — PLAN OF CARE
Malena was able to rest over night in between cares.. VSS, afebrile. Denies any HA, blurry vision, N/V, RUQ pain, SOB, chest pain, fever/chills, vaginal bleeding or leaking of fluid. Surgery orders released and IV flushed. Has been NPO except water since 0000, and NPO of liquids since about 0315. Mostly minimal variability through out the night with periods of moderate variability and 15x15 accels. Has had episodic variables and decel through the night, but nothing recurrent and resolved with repositioning. This morning reported some lower abdominal cramping that happens about once an hour in the middle of her abdomen. Abdomen palpates soft and without tenderness. Reviewed when to notify RN of any changes. See FS for EFM interpretation. Pt reoriented to call light, and reviewed/encouraged patient to notify staff of any changes in condition.

## 2025-05-23 VITALS
HEART RATE: 72 BPM | BODY MASS INDEX: 38.44 KG/M2 | RESPIRATION RATE: 16 BRPM | OXYGEN SATURATION: 100 % | SYSTOLIC BLOOD PRESSURE: 114 MMHG | DIASTOLIC BLOOD PRESSURE: 63 MMHG | TEMPERATURE: 97.9 F | WEIGHT: 217 LBS

## 2025-05-23 LAB
ALT SERPL W P-5'-P-CCNC: 13 U/L (ref 0–50)
AST SERPL W P-5'-P-CCNC: 24 U/L (ref 0–45)
CREAT SERPL-MCNC: 0.79 MG/DL (ref 0.51–0.95)
EGFRCR SERPLBLD CKD-EPI 2021: >90 ML/MIN/1.73M2
GP B STREP DNA SPEC QL NAA+PROBE: NEGATIVE
HGB BLD-MCNC: 9 G/DL (ref 11.7–15.7)
MCV RBC AUTO: 90 FL (ref 78–100)
MCV RBC AUTO: 90 FL (ref 78–100)
PLATELET # BLD AUTO: 255 10E3/UL (ref 150–450)

## 2025-05-23 PROCEDURE — 84450 TRANSFERASE (AST) (SGOT): CPT

## 2025-05-23 PROCEDURE — 250N000011 HC RX IP 250 OP 636: Mod: JZ

## 2025-05-23 PROCEDURE — 82565 ASSAY OF CREATININE: CPT

## 2025-05-23 PROCEDURE — 85049 AUTOMATED PLATELET COUNT: CPT

## 2025-05-23 PROCEDURE — 84460 ALANINE AMINO (ALT) (SGPT): CPT

## 2025-05-23 PROCEDURE — 85018 HEMOGLOBIN: CPT

## 2025-05-23 PROCEDURE — 250N000013 HC RX MED GY IP 250 OP 250 PS 637

## 2025-05-23 PROCEDURE — 36415 COLL VENOUS BLD VENIPUNCTURE: CPT

## 2025-05-23 PROCEDURE — 120N000002 HC R&B MED SURG/OB UMMC

## 2025-05-23 RX ORDER — ENOXAPARIN SODIUM 100 MG/ML
40 INJECTION SUBCUTANEOUS EVERY 24 HOURS
Status: DISCONTINUED | OUTPATIENT
Start: 2025-05-23 | End: 2025-05-24 | Stop reason: HOSPADM

## 2025-05-23 RX ADMIN — KETOROLAC TROMETHAMINE 15 MG: 15 INJECTION, SOLUTION INTRAMUSCULAR; INTRAVENOUS at 00:05

## 2025-05-23 RX ADMIN — SENNOSIDES AND DOCUSATE SODIUM 2 TABLET: 50; 8.6 TABLET ORAL at 21:19

## 2025-05-23 RX ADMIN — KETOROLAC TROMETHAMINE 15 MG: 15 INJECTION, SOLUTION INTRAMUSCULAR; INTRAVENOUS at 06:09

## 2025-05-23 RX ADMIN — ACETAMINOPHEN 975 MG: 325 TABLET, FILM COATED ORAL at 17:46

## 2025-05-23 RX ADMIN — ACETAMINOPHEN 975 MG: 325 TABLET, FILM COATED ORAL at 03:24

## 2025-05-23 RX ADMIN — ACETAMINOPHEN 975 MG: 325 TABLET, FILM COATED ORAL at 23:51

## 2025-05-23 RX ADMIN — ACETAMINOPHEN 975 MG: 325 TABLET, FILM COATED ORAL at 11:13

## 2025-05-23 RX ADMIN — SENNOSIDES AND DOCUSATE SODIUM 2 TABLET: 50; 8.6 TABLET ORAL at 08:11

## 2025-05-23 RX ADMIN — IBUPROFEN 800 MG: 800 TABLET, FILM COATED ORAL at 23:50

## 2025-05-23 RX ADMIN — IBUPROFEN 800 MG: 800 TABLET, FILM COATED ORAL at 17:47

## 2025-05-23 ASSESSMENT — ACTIVITIES OF DAILY LIVING (ADL)
ADLS_ACUITY_SCORE: 32

## 2025-05-23 NOTE — PROGRESS NOTES
Obstetrics Postpartum Progress Note  DOS: 25  MRN: [id number]    POD#2 after RLTCS     S: Malena is doing well. She has no pain, is tolerating PO, ambulating, voiding, passing flatus. Asking to go home today. Denies headaches, vision changes, chest pain, SOB.  Feeding well . No other acute concerns.  O:  Vitals:    25 0815 25 1220 25 1746 25 2347   BP: 118/72 104/71 126/77 111/65   BP Location: Right arm Right arm  Right arm   Patient Position: Semi-Cheney's Semi-Cheney's  Semi-Cheney's   Cuff Size: Adult Regular Adult Large  Adult Regular   Pulse: 73 76     Resp: 16 16 16 16   Temp: 97.6  F (36.4  C)  98  F (36.7  C) 97.9  F (36.6  C)   TempSrc: Oral  Oral Oral   SpO2: 100%      Weight:             Gen:  NAD,   CV: Regular rate, well perfused  Resp: Nonlabored on room air, normal inspiratory effort  Abd: soft, nondistended, nontender, fundus is U-1  Incision:  clean, dry, intact without adjacent erythema, edema, or shading   Ext: mild edema      Weight:   Vitals:    25 0500   Weight: 98.4 kg (217 lb)       Labs:  Hemoglobin   Date Value Ref Range Status   2025 9.0 (L) 11.7 - 15.7 g/dL Final   2025 10.9 (L) 11.7 - 15.7 g/dL Final       Rubella Antibody IgG   Date Value Ref Range Status   04/10/2025 Positive  Final     Comment:     Suggests previous exposure or immunization and probable immunity.       Assessment and Plan: 34 year old  POD#2 s/p RLTCS, doing well postpartum. Pregnancy was complicated by FGR and NRFHT, with persistently abnormal dopplers, recommending  delivery following a 3D antepartum stay. Doing well in postoperative period and meeting goals, appropriate for discharge today.     gHTN  - BP: largely normotensive overnight   - HELLP labs wnl      Routine postpartum:  Heme: Hgb 11.7 > QBL 1510 (TXA x2, mthg, [place] miso)  > 10.9 > 9.0(coags wnl) No symptoms of ABLA. Will discharge home w/ PO iron if Hgb under 10.  Pain: well-controlled with  tylenol, ibuprofen and oxycodone prn, continue.  Rh +/Rubella immune. No intervention required.  Feeding: NICU, pumping  :  s/p brasher,   PPX: Encourage ambulation, Lovenox 40mg daily started POD#1   Continue regular diet, scheduled bowel regimen, prn antiemetics  Contraception:  defers to 6 weeks. Discussed adequate pregnancy spacing of 18 months  Feeding: pumping    Medically Ready for Discharge: Anticipated today     Joe Gunn MD  Obstetrics & Gynecology, PGY-2  2025 7:33 AM        Physician Attestation   I personally examined and evaluated this patient.  I discussed the patient with the resident/fellow and care team, and agree with the assessment and plan of care as documented in the note.     Key findings: 34 year old  on POD 2 s/p RLTCS at 28w6d for non reassuring fetal status in the setting of fetal growth restriction. Patient is doing well, meeting discharge goals. Reviewed discharge instructions including activity restrictions, pelvic rest and follow up plan. Reviewed signs of excessive bleeding, infection, postpartum pre-eclampsia, mastitis, DVT and postpartum depression - instructed patient to call if concerned about any of these or other concerns. Patient to follow up in 2 and 6 weeks for routine postpartum visit, undecided for contraception. All questions answered.      Please see A&P for additional details of medical decision making.          Isaura Sutton MD  Date of Service (when I saw the patient): 25

## 2025-05-23 NOTE — LACTATION NOTE
This note was copied from a baby's chart.  Lactation Admission Note      Baby Information:  Infant's first name:  Nacho   Infant medical history: Born at 28w6d, prematurity, RDS, ELBW.      needed? No (Chart indicates Tanzanian ; mom declined for our visit, speaks English).     Lactation goal (if known): Breastfeed, have more milk than with last child.   Lactation history: /pumped for 12 months, plus formula supplementation (19 month old daughter born at 34w2d with 16 day stay in [facility] 11th floor NICU). Also history of IUFD and 2nd trimester losses.     Mother's Information: Name: Malena  Occupation:    Age: 34  Delivery type:     [place]: [place]  Pump for home use: [employer]     Partner's name:    Occupation:      Relevant maternal medical and social hx:       Information for the patient's mother:  Malena Thomas [[id number]]     Past Medical History:   Diagnosis Date    Known health problems: none    ,   Information for the patient's mother:  Malena Thomas [[id number]]     Patient Active Problem List   Diagnosis    Fetal demise in benito pregnancy greater than 22 weeks gestation, antepartum    Acute kidney failure, unspecified    History of pre-eclampsia    Preeclampsia, severe, third trimester    Need for diphtheria-tetanus-pertussis (Tdap) vaccine    Antepartum non-reassuring fetal heart rate or rhythm affecting care of mother    Non-reassuring electronic fetal monitoring tracing    Fetal growth restriction antepartum   , and   Information for the patient's mother:  Malena Thomas [[id number]]     Medications Prior to Admission   Medication Sig Dispense Refill Last Dose/Taking    aspirin 81 MG EC tablet Take 1 tablet (81 mg) by mouth daily. 90 tablet 3 2025    Prenatal Vit-Fe Fumarate-FA (PRENATAL MULTIVITAMIN W/IRON) 27-0.8 MG tablet Take 1 tablet by mouth daily. 90 tablet 3 2025         Relevant maternal medications:   None    Maternal risk  factors:  Obesity  Significant postpartum hemorrhage (EBL 1519)  Surgical birth     Admission Education given:  [x]Admission packet  [x]Kangaroo care  [x]Benefits of breast milk  [x]How breast milk is made  [x]Stages of milk production  [x]Milk supply/ goal volumes  [x]Hand expression  [x]Hands-on pumping  [x]Collecting, labeling, transporting milk  [x]Cleaning, sanitizing pump parts  [x]Storage of milk  [x]Benefits and use of pasteurized donor human milk    I met with Malena to go over lactation admission information. She has started pumping with hands on technique, followed by hand expression. She is pumping every 3 hours with 21mm flanges, getting small puddles thus far. Malena's 19 month old was in our NICU so she remembers much of the pumping regimen. She is hopeful to have increased amounts of milk this time, however had questions about what to do if she does not make a full supply. She asked about pumping every hour if no results; we went over recommended pumping frequency (every 2-3hours during day, 3-4hours at night) and rational, and care to prevent nipple soreness/abrasions with too frequent pumping. We discussed initiate and maintain and length of time for pumping sessions. We talked about watching her milk supply over next few days to see what her body can do, and assessing further if milk supply is low.     SHAYLEE Arias, RN, IBCLC   Lactation Consultant  Sai: [facility] [phone]  Office: [phone]

## 2025-05-23 NOTE — PLAN OF CARE
Goal Outcome Evaluation:         Data: Vital signs within normal limits. Postpartum checks within normal limits - see flow record. Patient eating and drinking normally. Patient able to empty bladder independently and is up ambulating. No apparent signs of infection. Incision healing well. Patient performing self cares.  Action: Patient medicated during the shift for pain. See MAR. Patient reassessed within 1 hour after each medication and pain was improved - patient stated she was comfortable. See flow record.  Response: Positive attachment behaviors observed with infant. Support persons  and daughter present.   Plan: Anticipate discharge on 5/24-5/25.      To Do:  [x]Mullin: 0  [x]Birth certificate  []Pump:  []Videos:

## 2025-05-23 NOTE — PROGRESS NOTES
Obstetrics Postpartum Progress Note  DOS: 25  MRN: [id number]    POD#1 after RLTCS     S: Patient states she is doing well.  Pain wel controlled with current pain meds. Lochia nl.  Eating and drinking without nausea/vomiting.  She is not passing flatus. Ambulating without difficulty.  Voiding without difficulty.  Denies headaches, vision changes, chest pain, SOB.  Feeding well . No other acute concerns  O:  Vitals:    25 1730 25 1745 25 1830 25   BP: 111/74 112/72 105/78 133/88   BP Location:    Right arm   Patient Position:    Semi-Cheney's   Cuff Size:    Adult Large   Pulse:    72   Resp: 16  16 16   Temp:   98.4  F (36.9  C) 98.1  F (36.7  C)   TempSrc:   Oral Oral   SpO2: 100% 98% 100% 100%   Weight:             Gen:  NAD,   CV: Regular rate, well perfused  Resp: Nonlabored on room air, normal inspiratory effort  Abd: soft, nondistended, nontender, fundus firm at  below umbilicus  Incision:  clean, dry, intact, ABD in place  Ext: mild edema    Urine output: ***    Weight: ***  Vitals:    25 0500   Weight: 98.4 kg (217 lb)         Labs:  Hemoglobin   Date Value Ref Range Status   2025 10.9 (L) 11.7 - 15.7 g/dL Final   2025 11.7 11.7 - 15.7 g/dL Final       Rubella Antibody IgG   Date Value Ref Range Status   04/10/2025 Positive  Final     Comment:     Suggests previous exposure or immunization and probable immunity.       Assessment and Plan: 34 year old  POD#1 s/p RLTCS, doing well postpartum. Pregnancy was complicated by FGR and NRFHT, with persistently abnormal dopplers, recommending  delivery. Admitted 3D ante service    Hx HELLP syndrome w/ DIC  Hx PreE w/ SF  - Nomrotensive overnight, HELLP labs nl  - monitor bleeding, nl    Routine postpartum:  Heme: Hgb 11.7 > QBL 1510 (TXA x2, mthg, [place] miso)  > 10.9 (coags wnl) No symptoms of ABLA. Will discharge home w/ PO iron if Hgb under 10***.  Pain: well-controlled with tylenol, ibuprofen and  oxycodone prn, continue.  Rh +/Rubella immune. No intervention required.  Feeding: NICU, pumping  :  s/p brasher,   PPX: Encourage ambulation  Continue regular diet, scheduled bowel regimen, prn antiemetics  Contraception:  Discussed adequate pregnancy spacing of 18 months    Dispo: Anticipate discharge home ***    ***

## 2025-05-23 NOTE — PROGRESS NOTES
Anesthesia Post-Partum Follow-Up Note After   Delivery with Combined Spinal and Epidural    Patient: Malena Thomas    Patient location: Post-partum floor    Anesthesia type: Spinal Block and Epidural    Subjective  Patient declines  this morning.    Malena Thomas does not complain of pruritis at this time. She denies weakness, denies paresthesia, denies difficulties voiding, denies nausea or vomiting, and denies headache. She is able to ambulate and tolerates regular diet.     Objective  Respiratory Function (RR / SpO2 / Airway Patency): Satisfactory  Cardiac Function (HR / Rhythm / BP): Satisfactory  Strength and sensation lower extremities: Normal  Epidural/Spinal site: No signs of infection or inflammation    Most recent vitals  BP (!) 124/94 (BP Location: Right arm, Patient Position: Semi-Cheney's, Cuff Size: Adult Large)   Pulse 70   Temp 36.6  C (97.8  F) (Oral)   Resp 16   Wt 98.4 kg (217 lb)   LMP 2024   SpO2 100%   Breastfeeding Unknown   BMI 38.44 kg/m      Assessment and plan  Malena Thomas is a 34 year old female  post partum day #1 s/p  delivery with CSE and TAP block for post-operative pain control.    At this time, there is no evidence of adverse side effects associated with anesthetic procedures performed. We encourage the continued use of multimodal analgesic therapy for adequate pain management over the next several days, and if any questions arise regarding anesthetic care, please reach out to the obstetric anesthesiology team.     Thank you for including us in the care of this patient.  Gerardo WORTHY Ma, MD

## 2025-05-23 NOTE — PLAN OF CARE
Goal Outcome Evaluation:  Problem: Postpartum ( Delivery)  Goal: Optimal Pain Control and Function  Outcome: Progressing  Intervention: Prevent or Manage Pain  Recent Flowsheet Documentation  Taken 2025 1841 by Lo Gerardo RN  Perineal Care: perineal hygiene encouraged     Data: Vital signs within normal limits. Postpartum checks within normal limits - see flow record. Patient eating and drinking normally. Patient able to empty bladder independently and is up ambulating. No apparent signs of infection. Incision healing well. PIV infiltrated, removed. Patient performing self cares. Breast pumping for baby in NICU.   Action: Pain has been adequately managed with pain medications. Abdominal binder is on for additional comfort and support.   Response: Offered to bring patient to visit baby in NICU x2 but patient has been mostly resting this shift. Set-up NICU video monitor so patient can see baby in NICU while in room. Support person, patients family member and patients toddler, present.   Plan: Continue with the plan of care.

## 2025-05-23 NOTE — CONSULTS
COPIED FROM BABY'S CHART    Social Work Initial Consult    DATA/ASSESSMENT    General Information  Assessment completed with: Parents, Mom, Malena Thomas  Type of visit: Initial Assessment      Reason for Consult: NICU admission  General Information Comments: Nacho is a , appropriate for gestational age, 28w6d, 2 lb 0.5 oz (920 g)  infant born by due to FGR, persistent absent end diastolic flow with intermittent reversed end diastolic flow.     Living Environment:   Primary caregiver: mother  Lives with: mother, sister     Names of People in Household: Malena Thomas (mom), Olivia (20 months)  Current living arrangements: apartment      Able to return to prior arrangements: yes     Family Factors  Family Risk Factors: unexpected hospitalization, single parent, other children at home needing care and attention, limited social support, limited financial resources  Family Strength Factors: experienced parents, willingness to havee vulnerable conversations about emotions, stable housing, able and willing to ask for help/accept help     Assessment of Support  Parental Marital Status: Single  Who is your support system?: Other family   Description of Support System: Supportive, Involved  Support Assessment: Patient communicates needs well met, Limited social contact and support    Malena identified her cousin as her main support system. She stated that this cousin is home caring for her daughter while she in the hospital. She is also frequently in contact with family who reside out of state and reports that they video chat often.     Employment/Financial  Patient's caregiver works full/part time: No     Malena is unemployed at this time and is very limited with financial resources. [facility]. She stated she does not want to apply for MFIP or SNAP at this time. She endorses having a car seat and crib for baby.       Coping/Stress  Major  "Change/Loss/Stressor: denies   Patient Personal Strengths: successful coping history, positive attitude, resilient   Sources of Support: other family members     Reaction to Health Status: accepting, hopeful   Understanding of Condition and Treatment: unable to assess     Malena has experienced loss and grief on more than one occasion. She has had 2 miscarriages and a stillbirth from 2018 to . When asked if baby's  birth and nicu admission has triggered any anxiety in her, Malena stated that she is very hopeful about Nacho's outcome since she lost her other babies much earlier in pregnancy. Malena recognized that her family was very empathetic and supportive of her when she experienced loss and stated that she did not utilize therapy or medication to work through her grief. She does not endorse having any current concerns at this time, but was open to SW emailing  mental health resources.           Additional Information:  SW met with Mom at bedside in [facility] to complete psychosocial assessment. Malena presented appropriately and demonstrated a positive outlook on lazaro Griffith's medical situation. She stated \"I know he is in good hands\" and endorses having didi that he will continue to improve in time. Nacho has a 20 month old sister named Olivia, who Malena describes as happy and excited to be a big sister. Baby Nacho qualifies for Ssi due to low birth weight. Malena is agreeable to applying for this benefit. SW offered to help with a parking pass, but Malena declined at this time and stated she does not need one yet. SW will email postpartum mental health and community resources.    INTERVENTION    Conducted chart review and consulted with medical team regarding plan of care. Introduced SW role and scope of practice.     Orientation to the unit (parking, lodging, meals, visitation)  Provided assessment of patient and family's level of coping  Conducted psychosocial assessment   Provided [facility] resources and " referrals Info for WIC , list of places to get lower cost diapers/clothing , and info to purchase discounted parking pass  Described process for obtaining birth certificate, social security card and insurance  Introduction to and education about Supplemental Security Income    Provided SW contact info    PLAN    SW will continue to follow for supportive intervention.     KIM Guido, SW  Maternal and Child Health   M-F 08:00-16:30 on Mobile Authentication   Cell Phone: [phone]  [email or web address]

## 2025-05-23 NOTE — PLAN OF CARE
The patient was transferred to the [facility] by way of the NICU to see baby, at 1830. Vitals were stable upon arrival. Fundus was firm at U/2 with scant lochia. Report was taken from L&D RN prior to transfer, and this [facility] RN transferred the patient, and assumed care on the [facility]. She was oriented to unit and room.

## 2025-05-23 NOTE — PLAN OF CARE
Goal Outcome Evaluation:          VSS, postpartum assessments WNL, except the BP and notified the provider. brasher removed this morning and pt is due to void.  up ad bebeto, eating and drinking normally. Incision dressing is clean dry and intact, , lochia is scant, no s/s infection. Taking tylenol and Toradol for pain and reports good pain management. Education provided on. Pt is agreeable with her plan of care. Baby is in the nicu and mother is pumping. Will continue with plan of care.

## 2025-05-24 ENCOUNTER — OFFICE VISIT (OUTPATIENT)
Dept: INTERPRETER SERVICES | Facility: CLINIC | Age: 35
End: 2025-05-24
Payer: COMMERCIAL

## 2025-05-24 VITALS
HEART RATE: 87 BPM | RESPIRATION RATE: 16 BRPM | OXYGEN SATURATION: 100 % | TEMPERATURE: 98.4 F | SYSTOLIC BLOOD PRESSURE: 118 MMHG | BODY MASS INDEX: 38.42 KG/M2 | DIASTOLIC BLOOD PRESSURE: 70 MMHG | WEIGHT: 216.9 LBS

## 2025-05-24 PROBLEM — O13.9 GESTATIONAL HYPERTENSION: Status: ACTIVE | Noted: 2025-05-24

## 2025-05-24 PROCEDURE — 250N000013 HC RX MED GY IP 250 OP 250 PS 637

## 2025-05-24 PROCEDURE — T1013 SIGN LANG/ORAL INTERPRETER: HCPCS

## 2025-05-24 RX ORDER — IBUPROFEN 600 MG/1
600 TABLET, FILM COATED ORAL EVERY 6 HOURS PRN
Qty: 60 TABLET | Refills: 0 | Status: SHIPPED | OUTPATIENT
Start: 2025-05-24

## 2025-05-24 RX ORDER — POLYETHYLENE GLYCOL 3350 17 G/17G
1 POWDER, FOR SOLUTION ORAL DAILY
Qty: 578 G | Refills: 1 | Status: SHIPPED | OUTPATIENT
Start: 2025-05-24

## 2025-05-24 RX ORDER — OXYCODONE HYDROCHLORIDE 5 MG/1
5 TABLET ORAL EVERY 6 HOURS PRN
Qty: 12 TABLET | Refills: 0 | Status: SHIPPED | OUTPATIENT
Start: 2025-05-24 | End: 2025-05-27

## 2025-05-24 RX ORDER — FERROUS SULFATE 325(65) MG
325 TABLET ORAL
Qty: 45 TABLET | Refills: 0 | Status: SHIPPED | OUTPATIENT
Start: 2025-05-24

## 2025-05-24 RX ORDER — ACETAMINOPHEN 325 MG/1
650 TABLET ORAL EVERY 6 HOURS PRN
Qty: 100 TABLET | Refills: 0 | Status: SHIPPED | OUTPATIENT
Start: 2025-05-24

## 2025-05-24 RX ORDER — AMOXICILLIN 250 MG
1 CAPSULE ORAL DAILY
Qty: 100 TABLET | Refills: 0 | Status: SHIPPED | OUTPATIENT
Start: 2025-05-24

## 2025-05-24 RX ADMIN — IBUPROFEN 800 MG: 800 TABLET, FILM COATED ORAL at 05:52

## 2025-05-24 RX ADMIN — SENNOSIDES AND DOCUSATE SODIUM 2 TABLET: 50; 8.6 TABLET ORAL at 08:32

## 2025-05-24 RX ADMIN — ACETAMINOPHEN 975 MG: 325 TABLET, FILM COATED ORAL at 05:52

## 2025-05-24 RX ADMIN — IBUPROFEN 800 MG: 800 TABLET, FILM COATED ORAL at 12:11

## 2025-05-24 RX ADMIN — ACETAMINOPHEN 975 MG: 325 TABLET, FILM COATED ORAL at 12:10

## 2025-05-24 ASSESSMENT — ACTIVITIES OF DAILY LIVING (ADL)
ADLS_ACUITY_SCORE: 32

## 2025-05-24 NOTE — LACTATION NOTE
This note was copied from a baby's chart.  Lactation Follow Up Note    Reason for visit/ call/ message (age: 2 days)  (corrected GA: 29w1d):  Dispense rental Symphony pump, check in on pumping comfort, milk coming in.     Supply:  Malena reports pumping once today, for at least 50ml in bottle.   She did not pump last night but is hoping to get on a good schedule now that she is discharging to home.     Significant changes (medications, equipment, comfort, etc):  No    Hand Hugs/ Skin to Skin/ Oral Care/ Nuzzling/ Latching:  Encouraged hand hugs currently, skin to skin holding when able.     Education given:  Reviewed recommended pumping schedule and rational, benefits of hands on pumping, hand expression.   Reviewed when to switch to maintain setting on Symphony.   Dispensed rental Symphony pump, kit, and went over trouble shooting tips, discussed where to return pump at [facility] in future.   Reminded where to obtain additional supplies in NICU when needed.     Plan:  Supply/comfort check at DOL 5.   Encouraged Malena to call with any lactation needs before scheduled check in.     SHAYLEE Arias, RN, IBCLC   Lactation Consultant  Sai: [facility] [phone]  Office: [phone]

## 2025-05-24 NOTE — PLAN OF CARE
Problem: Postpartum ( Delivery)  Goal: Successful Parent Role Transition  Outcome: Progressing   Goal Outcome Evaluation:               Data: Postpartum checks within normal limits - see flow record. Patient eating and drinking normally. Patient able to empty bladder independently and is up ambulating. No apparent signs of infection. Incision site CDI with small old dranage. Also pumping occasionally.    Action: Patient medicated during the shift for pain. See MAR. Pt continues to deny pain; rating her pain level a 0/10. Patient reassessed within 1 hour after each medication and pain was improved - patient stated she was comfortable in bed. See flow record.     Response: Positive attachment behaviors observed with infant. Support person present.     Plan: Anticipate discharge 2025 afternoon per patient's request.

## 2025-05-24 NOTE — DISCHARGE INSTRUCTIONS
Warning Signs after Having a Baby    Keep this paper on your fridge or somewhere else where you can see it.    Call your provider if you have any of these symptoms up to 12 weeks after having your baby.    Thoughts of hurting yourself or your baby  Pain in your chest or trouble breathing  Severe headache not helped by pain medicine  Eyesight concerns (blurry vision, seeing spots or flashes of light, other changes to eyesight)  Fainting, shaking or other signs of a seizure    Call 9-1-1 if you feel that it is an emergency.     The symptoms below can happen to anyone after giving birth. They can be very serious. Call your provider if you have any of these warning signs.    My provider s phone number: _______________________    Losing too much blood (hemorrhage)    Call your provider if you soak through a pad in less than an hour or pass blood clots bigger than a golf ball. These may be signs that you are bleeding too much.    Blood clots in the legs or lungs    After you give birth, your body naturally clots its blood to help prevent blood loss. Sometimes this increased clotting can happen in other areas of the body, like the legs or lungs. This can block your blood flow and be very dangerous.     Call your provider if you:  Have a red, swollen spot on the back of your leg that is warm or painful when you touch it.   Are coughing up blood.     Infection    Call your provider if you have any of these symptoms:  Fever of 100.4 F (38 C) or higher.  Pain or redness around your stitches if you had an incision.   Any yellow, white, or green fluid coming from places where you had stitches or surgery.    Mood Problems (postpartum depression)    Many people feel sad or have mood changes after having a baby. But for some people, these mood swings are worse.     Call your provider right away if you feel so anxious or nervous that you can't care for yourself or your baby.    Preeclampsia (high blood pressure)    Even if you  didn't have high blood pressure when you were pregnant, you are at risk for the high blood pressure disease called preeclampsia. This risk can last up to 12 weeks after giving birth.     Call your provider if you have:   Pain on your right side under your rib cage  Sudden swelling in the hands and face    Remember: You know your body. If something doesn't feel right, get medical help.     For informational purposes only. Not to replace the advice of your health care provider. Copyright 2020 [facility]. All rights reserved. Clinically reviewed by Caridad Greenwood, RNC-OB, MSN. Pixelpipe [id number] - Rev .    Emir griffith saaro: Waxa la filayo xaga Guriga   Section: What to Expect at Home  [facility] Hospitalro[facility]  Caneloa yuri monteirou nae saarayo ama Romaninka C, waa qaliin lagaga alfonsoo ilmaha sloan retana eren dhkhatarku ku sameeyo calooshaada hoose iyo terell galeenka.  Waxaad ku lahaan kartaa qayb xanuun calooshaada hoose oo waxaad u baahan kartaa dawada xanuunka ee 1 ilaa 2 todobaad. Waxaad filan kartaa qayb dhiig baxa siilka ah ilaa dhowr todobaad. Ankur gala haan waxaad u baahan kartaa ilaa 6 todobaad inaad si buuxda u bogsato.  Chas [place] in la daysto marka jarida ay bogsanayso. Iska ilaali qaadista waxa culus, hawlaha adag, ama jimicsiyada giijiya muruqyada caloosha marka aad bogsanayso. Waydiiso xubin qoyska ah ama saaxiib inuu kaa caawiyo shaqada guriga, wax karinta, iyo wax iibsiga.  Warqadan daryeelku waxay ku siinaysaa fikrad guud oo ku saabsan inta ay qaadanayso inaad nae kabsato. Laakiin qof walba wuxuu usoo kabsadaa xowli valeria severon. Raac talaaalejandro hoos ku qoran si aad u dareento ladnaan sida ugu [facility] Hospitalsa badan.  Sideed ugu daryeeshaa naftaada xaga guriga?  Hawsha  Naso marka aad daal dareento. Hurdo kugu filan oo aad hesho baa kugu caawin doonta in aad ladnaato.  Isku day inaa socoto maalin kasta. Ku bilow inaad socotid in [facility] ka inaan intaa hore  u samaysay. In selena in selena, u kordhi cadada socodka aad socoto. Socodku waxa uu korfhiyaa qulqulka dhiiga oo waxay ka caawisaa ka hortagga kizzy wareenka, calool istaaga, iyo xinjiroga dhiiga.  Iska ilaali hawlaha adag, sida raacida baaskiilka, guclaynta, miisaan qaadiska, iyo jimicsiga eerobaatiga, ilaa 6 todobaad ama ilaa dhakhtarkaagu uu yidhaahdo waa sax.  Ilaa dhakhtarkaagu yidhaahdo waa sax, mart qaadin wax ka culus ilmahaaga.  Mart samayn jimicsiga jiif sare u nae kaca ee cadaadiya muruqyada calooshaada ilaa 6 todobaad ama ilaa dhakhtarkaagu sidaas yidhaahdo waa sax.  Ku hay barkimo halka lagaa qalay marka aad qufacdo ama qaadato neef qoto dheer. Hawk waxay taageeri doonaa calooshaada oo yarayn doontaa xanuunkaaga.  Waxaad u qubaysan kartaa sida caadiga ah. Qalaji meesha qaliinka arkaad dhammaysto.  Waxaad lahaan doontaa qayb dhiig bax siilka ah. Xidho xafaayadaha dumarka. Biyo ha isku shubin ama ha isticmaalin xafaayada selena ilaa dhakhtarkaagu uu yidhaahdo waa sax.  Waydii dhakhtarkaaga marka aad wadi karto baabuurka.  maangal ahaan waxaad u baahab doontaa inaad qaadato ugu yaraan 6 todobaad oo shaqo ah. Waxay ku xidhan tahay nooca shaqada aad qabto iyo sida aad dareeno.  Dhakhtarkaaga waydii markay lucio tahay inaad galmo samayso.  Cuntada (Diet)  Waxaad cuni kartaa cuntadaada caadiga ah. Haddii calooshaada ay kacsan tahay, isku day cunto aan aad u xiiso badnayn, duxdu ku yartahay sida bariis cad, digaag la dubay, rooti, iyo caano fadhi.  Cab biyo badan (ilaa dhakhtarkaagu uu kuu sheego inaadan samayn)  Waxaad ogsoonaan kartaa dhaqaaqa xidmaha inaanay caadi ahayn ka rolando qaliinkaaga. Hawk waa mid guud Isku day inaad iska ilaaliso calool istaaga iyo xanibka dhaqaaqa xidmaha. Waxaa laga yabaa inaad qaadato cuntada kaabbida liifka leh maalin kasta. Haddii aanad lahayn socodka caliisha ka rolando labba maalmood, waydii dhakhtarkaaga wax ku saabsan qaadashada dawada calool socod siinta ood egen.  Haddii aad naas nuuNovant Health Franklin Medical Centerayso,  xadid khamrada. Khamrada waxay sababi kartaa ilya la'aan iyo dhibaatooyinka kale ee caafimaadka ee ilmaha marka la naas nuujinayo haweenka ay cabbaan in [facility]. Waxay sidoo kale caio kartaa jidka kartida hooyada si loo quudiyo ilmahayga ama in loo daryeelo ilmaha qaabab kale. Ma jiran cilmi baadhid wayn oo ku saabsan dhab ahaan inta dhab ahaan waxyeelayn karta ilmahaaga. Iyaddoon khamro la cabin waa doorashada ugu amniga badan ee ilmahaaga. haddii aad doorato inaad wax cabto kolba, cab cj cabbitaan, oo xadid tirada wakhtiyada aad wax cabtay. Sug inaad naas nuujiso ugu yaraan 2 saacadood, ka rolando marka aad cabto si aad u yarayso qadarka khamrada ilmaha u raacaysa caanaha.  Dawooyinka  Dhakhtarkaaga ayaa kuu sheegi doona marka aad rolando u bilaabi karto daawooyinka. Isaga ama iyadu waxay sidoo kale ku siin doonaan tilmaamo ku saabsan wixii daawooyin cusub ah iyo qaadashadooda.  Haddii aad qaadato daawada dhiiga jilcisa, sida warfarin (Coumadin), clopidogrel (Plavix), ama aasbariinka, hubso inaad dhakhtarkaaga la hadasho. Isaga ama iyada ayaa kuu sheegi doona haddii iyo goorta aad bilaabayso qaadashada daawooyinka. Xaqiiqso inaad fahamsan tahay dhabahaan waxa uu dhakhtarkaagu rabo in aad samaysid.  Uqaado daawada xanuunka baabi'sa dhab ahaan sidii laguu nae faray.  Haddii dhakhtarkaagu ku nae siiyay daawada xanuunka baabi'sa u qaado sida uu ku nae faray.  Haddii aadan qaadanayn daawada xanuunka baabi'sa, waydii dhakhtarkaaga inaad qaadan karto mida farmashiyaha laga nae iibsado.  Haddii aadu malaynayso dawadaada xanuunka inay calooshaada bukaysiinayso.  Qaado dawadaada ka rolando cuntooyinka (iyaddoo dhakhtarkaagu uu kuu sheego inaadan samayn mooyaane).  Waydii dhakhtarkaaga dawada xanuunka valeria duwan.  Haddii hernestotarkaagu kuu qoray antibooyatik, u qaado sida lagu faray. Mart joojin inaad la hadasho sababtoo ah waad ladnaatay. Waxaad u baahan tahay inaad qaadato dawo badan oo antibooyatiga.  Srini meesha qaliinka  Haddii  aad ku leedahay liidad lagu dhejiyay ama xabag meesha jariinka, kudaa xabagta ilaa todobaad ama ilaa ay ka nae dhacdo.  Ku dhaq meesha maalin kasta biyo diiran, saabuun leh, oo qalaji. Ha isticmaalin hydrogen peroxide ama khamri, taasoo inaad buskooto yarayn karta. Waxaad ku dabooli kartaa aaga baandheejka gooska ah haddii dheecaan ka yimaado ama ay ku xoqonto dharka. Beddel baandheejka maalin kasta.  Meesha nadiifi oo qalaji.  Tilmaamaha kale.  Haddii aad naasnuujiso ilmahaaga, waxaad dareemi kartaa raaxomarka aad bogsanayso haddii aad saarto ilmaha markaas isaga ama iyadda ma dul jiifayo calooshaada. Sofia ilmahaaga gacantaada hoosteeda, oo jirkiisa ama jirkeeda aad xijiso dhinaca aad ka quudinayso. Ku taageer gacantaada jirka qaybta sare ee ilmahaagaga. Gacantaas waad ku koontarooli kartaa ilmaha madaxiisa si aad ugu keentid afkiisa ama afkeeda naasahaaga. Tan wakhtiyada qaarkeed waxaa loogu yeedha haynta kubbada cagta.  Lasocoshada-Daryeelka waa qayb muhiim ah oo ka mid ah daaweyntaada iyo badbaadadaada. Hubso in aad sameysato oo aad tagto balamaha dhakhtarka oo josr, waxaadna wacdaa dhakhtarkaaga haddaad dhib qabto. Sidoo kale waa fikrad fiican inaad ogaato natiijayada baaritaannada lagugu sameeyay, iyo inaad kaydsato liiska daawooyinka aad qaadato.  Goormaad wacanaysaa caawimo?  [facility] 911  [facility] Hospitaledy ma oo aad is leedahay waxaad u madeline tahajoesph cosmeyejolie dagdayesy rosales. Heriberto foreman, nae [facility] haddii:  Waxaad leedahay fikradaha waxyeelaynta naftaada, ilmahaaga, ama qof kale.  Aad suuxday (miyir-[facility] Centerjoesph).  Waxaad qabtaa xabd xanuubhavik, waa nestarr moon, ama qufaceysaa dhiig.  Qalal ayaad qabtaa.  [facility] hadda ama raadso daryeel caafimaad haddii:  Aad leedahay xanuun skyn nae farzad erwin markaad qaadatay daawada xabhavikuunedu bamelanie'sa.  Aad leedahay dhiig kasocda siilka.  Wareer ama madax fudayd, ama waxaad dareemaysaa sidii adigoo suuxaya.  Waxaad qabtaa mid cusub ama xanuun edu jimenez calooshaada ama  "misigta.  Waxaad waydaa toliinkaaga, ama halkii la jaray ayaa furma.  Aad leedahay calaamado infakshanka sida:  In uu kordho gaurav, saul, diirimaad, ama gaduudasho.  Xariiqimo gaduudan kasoo baxay agagaarka.  Dheecaan malax oo ka imanaysa agagaarka.  Xumad.  Waxaad leedahay calaamadaha xinjirowga dhiiga ee lugtaada (loogu yeedho xinjirta dhiiga qotoda dheer), sida:  Xanuun kubka, jilibka dhabarkiisa, bowdada ama saraxa.  Guduudni ama barar lugtaada ah ama gumaarka.  Waxaa ku haya calaamadaha preeclampsia, sida:  Barar degdeg ku fuula wajiga, gacmaha ama cagaha.  Dhibaatooyinka araga oo cusub (sida daciifid ama aan saanu u muuqan, ama arag dhibco).  Madax xanuun aad u badan.  Fiiro dhow u yeelo isbeddalka caafimaadkaaga, hubsana inaad dhakhtarkaaga la xiriirto haddii:  Aide aadan nae ladnaan sidii la filayay.  Halkeed ku pippa kartaa wax badan?  Tag   [email or web address]  Katelynn M806 Gudaha sanduuqa raadinta si loo tamar wax badan oo ku saabsan. \"Qaliinka Ilmaha uurka laga saaro: Waxa la filayo xaga Guriga.\"  Hadda laga bilaabo: Bisha Afraad 30, 2024  Nooca Tusmadda: 14.4    [id number] [facility], [facility].   Saraiamaha carmelinayeelka la qaatay hoosta ruqsada xirfad yaqaankaaga daryeelka caafimaadka Haddii aad qabto root aalo ku saabsan xaalada caafimaadka ama tilmaantan, waydii had iyo landon xirfad yaqaankaaga caafimaadka. [facility], [facility] waxay diiday damaanad kasta ama masuuliyada isticmaalka abimbola.  Ku Eegista Cadaadiska Dhiiggaaga Guriga  Inta lagu jiro iyo ka rolando xilliga uurka  Sideen u cabbiraa cadaadiska dhiiggayga?  Waxaa muhiim ah inaad cabbirada qaado isku wakhti maalin walbo, sida subixii iyo fiidkii. Iska cabbir cadaadiska dhiiggaaga ka hor inta aadan qaadanin wax daawooyinka subixii ah.  Sida loo helo steve ugu saxsan  30 daqiiqo ka hor inta aadan cabbirin cadaadiska dhiiggaaga, iska ilaali kuwan nae socda:  Cabitaanka kafeyga  Cabitaanka khamro  Wax cunista  Sigaar " cabista  Jimicsiga  5 daqiiqo ka hor inta aadan hubinin cadaadiska dhiiggaaga:  Isticmaal musqusha oo nae kaadi si aad u faarujiso kaadi-heystaada.  Ku fadhiiso kursi adiga oo aan is-nuuxinin ilaa 5 daqiiqo. Isdeji oo is-xasili hana hadlin haddii ay macquul tahay.  Si aad u hubiso cadaadiska dhiiggaaga:     Kursi ku fadhiiso oo istoosi.  Cagahaaga dhulka dhig. Mart is-dulsaarin anqowyadaada ama lugahaaga.  Gacantaada saar meel la siman wadnahaaga miiska korkiisa ama gacanta kursiga korkeeda. Jaja gacantaas isticmaal maalin walbo.  Kor u jiid gacanta shaatigaaga. Haku qaadin cabbirka maryaha korkooda.  Ku duub mashiinka cabbirka dhiigga qaybta kore ee gacantaada bidix, 1 inji (2.5 cm) xusulkaaga korkiisa.  Si adag ugu wareeji oo la ekaysii mashiinta gacantaada. Waa inaad cj far galin karto mashiinka iyo gacantaada dhexdooda.  Ku aadi xariga oo dulsaar halka uu xusulkaaga ka nae laabto.  Riix badhanka daarista.  Si degan u fariiso inta ay mashiintu is-aariyeynayso oo is-aariyo bixinayso.  Akhri qoraalka dijitaalka ah ee shaashada mashiinka saaran oo ku qoro xisaabta (diwaangeli) buug xusuus qor.  U sug 2-3 daqiiqadood, kadibna rolando u nae bilow tallaabooyinka, adiga oo ka bilaabaya tallaabada 1.  Sifooyin noocee ah ayaad u baahan tahay?  Mashiinta gacanta lagu duubo ee shaashada lagala socdo ayaa sheegta xisaabta ugu saxan.  Mashiinta curcurka lagu xiro ama farta la saaro ee lagu cabbiro cadaadiska dhiigga inta badan sax ma ahan.  Nae qaado mashiinta cabbirka cadaadiska dhiigga ee la mariyay tijaabooyin si loo muujiyo inay lobitoka saxda  qaado. Mashiinnada gacanta lagu duubo ee cabbira cadaadiska dhiigga ee lagu iibiyo Heather ee la mariyay tijaabooyiganesh perez [email or web address].  Quoc hernandez  mashiinada la mariyay tijaabooyinedu waa:  Omron 3 Series Upper Arm Blood Pressure Monitor (Model [id number])  Omron 5 Series Upper Arm Blood Pressure Monitor (Model TX8881)  Omron 7 Series Upper Arm  Blood Pressure Monitor (Model HEM-7320)  A&D Medical Upper Arm Blood Pressure Monitor with Talking Function (UA 1030T)  Ha isticmaalin negro-yaasha taleefonka casriga ah. Waxaa jira negro-yaal badan oo ku jira taleefonka casriga ah oo sheega inay baaraan cadaadiska dhiiggaaga iyaga oo isticmaalaya garaaca wadnaha ee curcurkaaga ama fartaada. [place] ma shaqeeyaan. Wax tijaabo ah gilbertoa juancarlos. Mart u gudbinin xaruntaada caafimaad cabbirka cadaadiska dhiigga oo aad ku qaaday negro-ka taleefonka casriga ah.  Haddii aad leedahay [place] Keydka Kharashka Caafimaadka (FSA) ama [place] Keydadka Caafimaadka (HSA), waa inaad rolando iskuugu gudo (isku [facility]) mashiinka iyo xarigeeda. Mashiinka lagula socdo cadaadiska dhiigga waa judy la oggolayahay in [facility] Hospitalanka laga nae iibsado (OTC) adiga rolando uga magdhawanaya akoonadaan.  Cabbirka mashiinta  Cabbirka mashiinta gacanta lagu duubo ayaa sifo muhiim ah. Hubso in duubka gacanta uu si sax u le'egyahay gacantaada. Haddii duubka uusan si sax kuu le'ekayn, natiijada cabbirka waxay noqon doontaa midkood mid aad u sarreysa ama u hosseysa.  Si aad u ogaato cabbirka duubka ee aad nae iibasanayso, cabbir ka qaad wareegga muruqaaga gacanta (gacanta kore).  Isticmaal cajalada cabbirka ee dabacsan ama mastarada xaanshida ah. Ku qabo cajalada cabbirka kilkishaada iyo xusulkaaga labo dhexdooda. Ku qaad cabbir masaafaha wareega gacantaada inji ahaan.  Waxaad u baahan doontaa inaad nae iibsato duub gaar ka ah mashiinta si aad u hesho cabbirka saxda ah.  Cabbirada duubka iyo cabbirada gacanta  Qof weyn oo selena: 22 ilaa 26 cm (8.7 ilaa 10.2 inji)  Qof weyn: 27 ilaa 34 cm (10.6 ilaa 13.4 inji)  Qof sii weyn: 35 ilaa 44 cm (13.8 ilaa 17.3 inji)  Cajirka qof weyn: 45 ilaa 52 cm (17.7 ilaa 20.5 inji)      Ujeeddo wargelin oo kaliya. Ma aha inay beddel u noqoto talroger richter. Sawir: ID [id number]   [facility]  [facility]. Xuquuqda qoraallada waxaa [facility]   2023 [facility]. [facility]  xuquuqdu ninoska dueñas. Wolf caafimaad ahaan rolando u eegay [facility]. [employer] [id number] - REV 03/23.  Checking Your Blood Pressure at Home  During and after pregnancy  How do I measure my blood pressure?  It s important to take the readings at the same time each day, such as morning and evening. Take your blood pressure before taking any morning medications.  How to get the most accurate reading  30 minutes before checking your blood pressure, avoid the following:  Drinking caffeine  Drinking alcohol  Eating  Smoking  Exercising  5 minutes before checking your blood pressure:  Use the bathroom and urinate so you have an empty bladder.  Sit still in a chair for around 5 minutes. Stay calm and relaxed and do not talk if possible.  To check your blood pressure:     Sit up straight in a chair.  Place your feet on the floor. Don t cross your ankles or legs.  Rest your arm at the level of your heart on a table or desk or on the arm of a chair. Use the same arm every day.  Pull up your shirt sleeve. Don t take the measurement over clothes.  Wrap the blood pressure cuff around the upper part of your left arm, 1 inch (2.5 cm) above your elbow.  Fit the cuff snugly around your arm. You should be able to place only one finger between the cuff and your arm.  Position the cord so that it rests in the bend of your elbow.  Press the power button.  Sit quietly while the cuff inflates and deflates.  Read the digital reading on the monitor screen and write the numbers down (record them) in a notebook.  Wait 2-3 minutes, then repeat the steps, starting at step 1.  Which features do you need?  Arm cuff monitors give the most exact readings.  Wrist and finger blood pressure monitors are often less exact.  Pick a blood pressure monitor that has passed tests to show they measure exactly. Blood pressure cuffs for sale in the [place] that have passed tests are listed on the website www.validatebp.org.  Some monitors that  "have passed tests are:  Omron 3 Series Upper Arm Blood Pressure Monitor (Model [id number])  Omron 5 Series Upper Arm Blood Pressure Monitor (Model GR5063)  Omron 7 Series Upper Arm Blood Pressure Monitor (Model HEM-7320)  A[employer]D Medical Upper Arm Blood Pressure Monitor with Talking Function (UA 1030T)  Don t use smartphone apps. There are many smartphone apps that claim to check your blood pressure using the pulse in your wrist or finger. These don t work. They haven t passed any tests. Don t give your clinic a blood pressure reading from a smartphone engro.  If you have a flexible spending account (FSA) or health savings account (HSA), you may wish to pay yourself back (reimburse) for the machine and cuff. A blood pressure monitor is an allowed over-the- counter (OTC) item to pay yourself back from these accounts.  Cuff size  The size of the arm cuff is a key feature. Make sure the cuff is the right size for your arm. If the cuff isn t the right size, readings will either be too high or low.  To know what size cuff to buy, measure the distance around your bicep (upper arm).  Use a flexible measuring tape or . Place the measuring tape senior living between your armpit and elbow. Measure the distance around your arm in inches.  You may need to buy a cuff apart from the machine to get the right size.  Cuff sizes and arm measurements  Small adult: 22 to 26 cm (8.7 to 10.2 inches)  Adult: 27 to 34 cm (10.6 to 13.4 inches)  Large adult: 35 to 44 cm (13.8 to 17.3 inches)  Adult thigh: 45 to 52 cm (17.7 to 20.5 inches)    Copyright statement\" content=\"For informational purposes only. Not to replace the advice of your health care provider. Photo: ID [id number]   [employer]. Text copyright   2023 [facility]. All rights reserved. Clinically reviewed by [facility]. [employer] 072520 - REV 03/23.    "

## 2025-05-24 NOTE — PROGRESS NOTES
Summary:  Malena was d/c'd to home at 1530 today in stable and comfortable condition.  Reviewed discharge AVS, instructions and meds - Tongan intrerpeter ipad used for discharge instructions.  Pt had declined signed a declination of services. Written info given in both English/Tongan when available. Pt states understanding of resources, when to seek care, meds, follow up.  Pt states she declines home care and has informed them.  She states she will call the clinic for an apt in 3 days.  States understanding of BP cuff and has at home which she used throughout preg.  Pump given by LC.  Malena visited with NB in NICU today - called for login info and given to pt.  States readiness for discharge and readiness.  Has belongings.  Spouse will escort her from the hospital.  Declined transport.

## 2025-05-24 NOTE — PLAN OF CARE
Goal Outcome Evaluation:         VSS.  Assessments WNL.  Pt reports no dizziness with ambulation.  Up ab bebeto.  Reports had soft BM, +flatus, voiding.  No reported HA, vision changes or epigastric pain.  Ipad  used this shift for educ and assessments.   Pt also communicates in English and at times declines need for ipad use.  She ordered her own meals.  Pt reports her son is doing well at 28.6 wks in the NICU.  Pumping.  Seen by DONNELL Escobar and given br pump for home - rental by ODNNELL.  Seen by Dr Sutton.  Pt states readiness for discharge to home this afternoon.  Comfortable with scheduled tyl/motrin.  Visited NB in the NICU.

## 2025-05-27 ENCOUNTER — PATIENT OUTREACH (OUTPATIENT)
Dept: CARE COORDINATION | Facility: CLINIC | Age: 35
End: 2025-05-27
Payer: COMMERCIAL

## 2025-05-27 NOTE — PROGRESS NOTES
Clinic Care Coordination Contact  Transitions of Care Outreach  Chief Complaint   Patient presents with    Clinic Care Coordination - Post Hospital       Most Recent Admission Date: 2025   Most Recent Admission Diagnosis: Fetal growth restriction antepartum - O36.5990  Non-reassuring electronic fetal monitoring tracing - O36.8390     Most Recent Discharge Date: 2025   Most Recent Discharge Diagnosis: Fetal growth restriction antepartum - O36.5990  S/P  section - Z98.891  Postpartum care and examination of lactating mother - Z39.1  Gestational hypertension, antepartum - O13.9  Acute post-operative pain - G89.18     Transitions of Care Assessment    Discharge Assessment  How are you doing now that you are home?: good  How are your symptoms? (Red Flag symptoms escalate to triage hotline per guidelines): Improved  Do you know how to contact your clinic care team if you have future questions or changes to your health status? : Yes  Does the patient have their discharge instructions? : Yes  Does the patient have questions regarding their discharge instructions? : No  Were you started on any new medications or were there changes to any of your previous medications? : Yes  Does the patient have all of their medications?: Yes  Do you have questions regarding any of your medications? : No  Do you have all of your needed medical supplies or equipment (DME)?  (i.e. oxygen tank, CPAP, cane, etc.): Yes                  Follow up Plan     Discharge Follow-Up  Discharge follow up appointment scheduled in alignment with recommended follow up timeframe or Transitions of Risk Category? (Low = within 30 days; Moderate= within 14 days; High= within 7 days): Yes  Discharge Follow Up Appointment Scheduled with?: Primary Care Provider    No future appointments.    Outpatient Plan as outlined on AVS reviewed with patient.    For any urgent concerns, please contact our 24 hour nurse triage line: [phone]  ([phone])       JONI Mcfadden

## 2025-05-27 NOTE — ANESTHESIA POSTPROCEDURE EVALUATION
Patient: Malena Thomas    Procedure: Procedure(s):   section       Anesthesia Type:  Spinal    Note:  Disposition: Admission   Postop Pain Control: Uneventful            Sign Out: Well controlled pain   PONV: No   Neuro/Psych: Uneventful            Sign Out: Acceptable/Baseline neuro status   Airway/Respiratory: Uneventful            Sign Out: Acceptable/Baseline resp. status   CV/Hemodynamics: Uneventful            Sign Out: Acceptable CV status; No obvious hypovolemia; No obvious fluid overload   Other NRE: NONE   DID A NON-ROUTINE EVENT OCCUR? YES    Event details/Postop Comments:  Postpartum hemorrhage >1.5Lts           Last vitals:  Vitals Value Taken Time   /72 25 17:45   Temp 36.4  C (97.6  F) 25 15:00   Pulse 73 25 15:45   Resp 16 25 17:30   SpO2 98 % 25 17:45       Electronically Signed By: Luna Kraus MD  May 27, 2025  9:52 AM

## 2025-05-28 ENCOUNTER — LACTATION ENCOUNTER (OUTPATIENT)
Dept: OTHER | Facility: CLINIC | Age: 35
End: 2025-05-28

## 2025-06-03 ENCOUNTER — LACTATION ENCOUNTER (OUTPATIENT)
Dept: OTHER | Facility: CLINIC | Age: 35
End: 2025-06-03

## 2025-06-12 ENCOUNTER — LACTATION ENCOUNTER (OUTPATIENT)
Dept: OTHER | Facility: CLINIC | Age: 35
End: 2025-06-12

## 2025-06-12 NOTE — LACTATION NOTE
"This note was copied from a baby's chart.  Lactation Follow Up Note    Reason for visit/ call/ message (age: 21 days)  (corrected GA: 31w6d):  Comfort/supply check. Last contact day +5 despite phone calls, repeated attempts to catch at bedside. Team also has had difficulty with consistent communication.  Malena requests only female interpreters for discussing lactation.    Supply:  States she is pumping every 3 hours however historically milk time/date brought in indicates 2-3x/d. She pumped 35ml this morning, all from right breast. Left breast has minimal drops. She has been pumping for 30 minutes until machine shuts off.    Significant changes (medications, equipment, comfort, etc):  Right nipple pain/sensitivity. Denies cracks or blisters.    Hand Hugs/ Skin to Skin/ Oral Care/ Nuzzling/ Latching:  Malena states she holds STS. Encouraged STS and gave rationale.    Education given:  Pump for shorter duration, watching flow and not waiting for pump to turn off; likely 20\" duration appropriate.  Gave nipple cream (MotherLove and Lansinoh samples) for nipple care.  Gave pumping spray lubricant.  Reviewed her pumping history, lactation goalsC  Talked about milk making reminders including hands on pumping and hand expression, recommended pumping 8x/d.  Discussed hormonal window for establishing supply.  Provided positive feedback, support and encouraged maternal self care as she is juggling a lot as single parent with a toddler at home.    Plan:  Check back in a few days to see if nipple sensitivity improved with above measures.   Continued support to help mom reach her lactation goals.    Apple Espinal, RNC-DULCE, IBCLC   Lactation Consultant  Sai: [facility] [phone]  Office: [phone]      "

## 2025-06-18 ENCOUNTER — LACTATION ENCOUNTER (OUTPATIENT)
Dept: OTHER | Facility: CLINIC | Age: 35
End: 2025-06-18

## 2025-06-22 ENCOUNTER — LACTATION ENCOUNTER (OUTPATIENT)
Dept: OTHER | Facility: CLINIC | Age: 35
End: 2025-06-22

## 2025-06-22 NOTE — LACTATION NOTE
This note was copied from a baby's chart.  Lactation Follow Up Note    Reason for visit/ call/ message (age: 31 days)  (corrected GA: 33w2d):  Malena called lactation office with questions, main reason for calling was to discuss milk supply and use of Fenugreek.     Supply:  She is pumping every 3 hours for 30ml per pumping. Two days ago she expressed less volume but couldn't pinpoint changes that may have been related, recently her left breast didn't produce any milk during a pumping session. She believes the pump was working fine, and denies any symptoms of ductal narrowing/plugged ducts/breast congestion.     Significant changes (medications, equipment, comfort, etc):  Improved comfort/fit with pumping.   Malena is concerned about milk supply.     Hand Hugs/ Skin to Skin/ Oral Care/ Nuzzling/ Latching:  Encouraged frequent skin to skin holding, encouraged discussion with NICU team regarding nuzzling in future.     Education given:    Malena states she would really like to increase milk supply, asking about ways to do this, including questions about herbal Fenugreek. We reviewed some risk factors (see below) and discussed going back to basics of pumping to try to increase milk supply, ensuring frequency as most importance factor for making breastmilk (at least 8-10x per day, average of every 3 hours which she states she is performing). We talked about hormone control vs supply/demand, and signal to refill breasts with milk each time milk is expressed/pumped. Encouraged her to continue to use gentle massage with hands free pumping band and follow with hand expression.   Discussed anecdotal evidence for use of herbs in lactation, but less researched evidence. Fenugreek could be considered, however I encouraged her to discuss with her provider. We talked about option of seeing an outpatient lactation consultant that is also an advanced practice provider, that could discuss labs, medications, and herbs more in depth in  relation to her full medical history. I encouraged her to make an appointment now as it takes time to get in, and cancel if supply increases with any changes made. I left printed lactation resources information at [facility]'s bedside per request.   Encouraged skin to skin holding often, recommended talking with NICU team about appropriateness of nuzzling at breast. Encouraged pumping right after skin to skin sessions.   Encouraged to have [facility] NICU RN call lactation when mom at his bedside next, so we can meet in person for further review/assessment of risk factors and assist with pumping session if interested.     Maternal risk factors reviewed over phone (please continue with factors not discussed during next visit/call)     I reviewed risk factors:  Trauma/ surgery: No  Thyroid disease: No  Diabetes: No  Hormonal birth control: No  Smoker:  No   Hypertension:No (previous pregnancies preeclampsia)   IGT: Not noted   Growth in pregnancy:    Placenta delivery complications:    Resumed menses: No   Hx PCOS:    Hx infertility: unknown; history of 2 miscarriages and stillbirth [id number]  Edema:    New meds: No    Age: 35  Increased BMI:    Surgical birth: yes  PPH: yes QBL 1519  Stress: yes   Depression: not noted   Anxiety: not noted   ADD/ ADHD: not noted   PTSD:      Plan:  Malena plans to  lactation resources information when in NICU next; may make outpatient lactation consultant visit.   She will have nurse call lactation next time she is at bedside prior to 7pm (challenging to visit regularly).   Lactation to continue to provide lactation support, encouragement.     SHAYLEE Arias, RN, IBCLC   Lactation Consultant  Sai: [facility] [phone]  Office: [phone]

## 2025-07-15 ENCOUNTER — LACTATION ENCOUNTER (OUTPATIENT)
Dept: OTHER | Facility: CLINIC | Age: 35
End: 2025-07-15

## 2025-07-15 NOTE — LACTATION NOTE
"This note was copied from a baby's chart.  Lactation Follow Up Note    Reason for visit/ call/ message (age: 54 days)  (corrected GA: 36w4d):  Per maternal request.     Supply:  Malena states she has been pumping on regular schedule of every 3 hours day and night, however she has not been able to express any milk for the past 4 days with pumping or hand expression. (Of note, historically, bottles of milk brought into NICU for Nacho show dates/times that equal 2-3x pumping per day). Prior to milk dropping off completely, she states her most recent volumes were large puddles up to 15ml-30ml per pumping.     Significant changes (medications, equipment, comfort, etc):  Milk supply dropped off completely.   Menses returned 2 days prior to milk drying up, no other changes per mom.     Hand Hugs/ Skin to Skin/ Oral Care/ Nuzzling/ Latching:  Encouraged continued skin to skin holding both for bonding, and milk benefits if Malena decides to continue pumping.     Education given:  Provided a listening ear for Malena, she shared that she is very sad her milk has gone away and feels like she \"took away his right to be fed breastmilk\" by her supply drying up. She shares that she wanted to provide milk for a long time for Nacho, and is indecisive about continuing to try pumping or stop efforts completely. We talked about what each scenario would look like, offered encouragement for her commitment to providing milk for Nacho and all the benefits of the milk she has provided thus far.   If wanting to try to re-lactate/continue to pump, she could go back to basics that we discussed right after Nacho's birth which include frequent pumping of every 2-3 hours during day, 3-4 hours at night (most important factor), hands on pumping, hand expression, skin to skin holding, could allow Nacho to nuzzle at breast, maybe explore SNS if interest. I brought Malena an additional belly band, milk making reminders information, and paper logs to help if " she decides to continue to pump. We also discussed recommendations from [place] marie regarding supplements of calcium and magnesium during menstrual cycle and I encouraged her to discuss with her provider. I gave her a handout on outpatient lactation resources within [place], and encouraged her to make an appointment with an advanced practice provider and IBCLC if she would like to continue to pursue re-establishing milk supply/pumping. We talked about unpredictability in knowing whether she will be able to resume making milk at this point, role of hormones, but could see what her body could do.   If Malena would like to discontinue pumping, we talked about watching breast comfort and where to return rental [employer] breast pump to a [facility] location when done. I gave [place].   Offered support for her decision either way. Celebrated milk she has been able to provide Aazim, her pumping efforts despite being  from him, long NICU stay, having another child at home with needs, having challenges with transportation to NICU. Encouraged self care, rest, and time to think and process decision she makes. Encouraged her to reach out to lactation to further discuss if interested.     Plan:  Malena may make appointment for outpatient [facility] IBCLC consult and continue pumping, may decide to stop pumping. She will let lactation know or we plan to check in again in a little over one week (Malena is proactive in calling lactation with any needs).   Lactation to continue to support Malena's decision regarding providing breast milk going forward.     SHAYLEE Arias, RN, IBCLC   Lactation Consultant  Sai: [facility] [phone]  Office: [phone]

## 2025-07-28 ENCOUNTER — LACTATION ENCOUNTER (OUTPATIENT)
Dept: OTHER | Facility: CLINIC | Age: 35
End: 2025-07-28

## 2025-07-28 NOTE — LACTATION NOTE
This note was copied from a baby's chart.  No longer pumping.  Closing lactation consult.        Akila Plummer RN, IBCLC   Lactation Consultant  Sai: [facility] [phone]  Office: [phone]

## 2025-08-10 ENCOUNTER — HEALTH MAINTENANCE LETTER (OUTPATIENT)
Age: 35
End: 2025-08-10

## 2025-08-26 LAB
PATH REPORT.COMMENTS IMP SPEC: NORMAL
PATH REPORT.COMMENTS IMP SPEC: NORMAL
PATH REPORT.FINAL DX SPEC: NORMAL
PATH REPORT.GROSS SPEC: NORMAL
PATH REPORT.MICROSCOPIC SPEC OTHER STN: NORMAL
PATH REPORT.RELEVANT HX SPEC: NORMAL
PHOTO IMAGE: NORMAL

## (undated) DEVICE — DRSG STERI STRIP 1/4X3" R1541

## (undated) DEVICE — GLOVE ESTEEM POWDER FREE SMT 6.5  2D72PT65

## (undated) DEVICE — SOL WATER IRRIG 1000ML BOTTLE 07139-09

## (undated) DEVICE — PACK C-SECTION LF PL15OTA83B

## (undated) DEVICE — CATH TRAY FOLEY 16FR BARDEX W/DRAIN BAG STATLOCK 300316A

## (undated) DEVICE — PREP CHLORAPREP 26ML TINTED ORANGE  260815

## (undated) DEVICE — SOL ADH LIQUID BENZOIN SWAB 0.6ML C1544

## (undated) DEVICE — GLOVE PROTEXIS BLUE W/NEU-THERA 7.0  2D73EB70

## (undated) DEVICE — GLOVE PROTEXIS BLUE W/NEU-THERA 6.5  2D73EB65

## (undated) DEVICE — SU VICRYL 4-0 PS-2 18" UND J496G

## (undated) DEVICE — STRAP KNEE/BODY 31143004

## (undated) DEVICE — ESU GROUND PAD UNIVERSAL W/O CORD

## (undated) DEVICE — SU MONOCRYL 0 CT-1 36" Y346H

## (undated) DEVICE — SOL NACL 0.9% IRRIG 1000ML BOTTLE 07138-09

## (undated) DEVICE — STOCKING SLEEVE COMPRESSION CALF LG

## (undated) DEVICE — SU MONOCRYL 4-0 PS-2 18" UND Y496G

## (undated) DEVICE — RX SURGIFLO HEMOSTATIC MATRIX W/THROMBIN 8ML 2994

## (undated) DEVICE — ESU PENCIL W/SMOKE EVAC NEPTUNE STRYKER 0703-046-000

## (undated) DEVICE — SU PLAIN 2-0 CT 27" 853H

## (undated) DEVICE — SU VICRYL 0 CT-1 36" J346H

## (undated) DEVICE — BARRIER INTERCEED 3X4" 4350

## (undated) RX ORDER — OXYTOCIN/0.9 % SODIUM CHLORIDE 30/500 ML
PLASTIC BAG, INJECTION (ML) INTRAVENOUS
Status: DISPENSED
Start: 2023-09-07

## (undated) RX ORDER — MORPHINE SULFATE 1 MG/ML
INJECTION, SOLUTION EPIDURAL; INTRATHECAL; INTRAVENOUS
Status: DISPENSED
Start: 2023-09-07

## (undated) RX ORDER — OXYTOCIN/0.9 % SODIUM CHLORIDE 30/500 ML
PLASTIC BAG, INJECTION (ML) INTRAVENOUS
Status: DISPENSED
Start: 2025-05-22

## (undated) RX ORDER — BUPIVACAINE HYDROCHLORIDE 7.5 MG/ML
INJECTION, SOLUTION INTRASPINAL
Status: DISPENSED
Start: 2025-05-22

## (undated) RX ORDER — FENTANYL CITRATE 50 UG/ML
INJECTION, SOLUTION INTRAMUSCULAR; INTRAVENOUS
Status: DISPENSED
Start: 2025-05-22

## (undated) RX ORDER — BUPIVACAINE HYDROCHLORIDE 2.5 MG/ML
INJECTION, SOLUTION EPIDURAL; INFILTRATION; INTRACAUDAL; PERINEURAL
Status: DISPENSED
Start: 2025-05-22

## (undated) RX ORDER — EPINEPHRINE 1 MG/ML
INJECTION, SOLUTION, CONCENTRATE INTRAVENOUS
Status: DISPENSED
Start: 2025-05-22

## (undated) RX ORDER — MORPHINE SULFATE 1 MG/ML
INJECTION, SOLUTION EPIDURAL; INTRATHECAL; INTRAVENOUS
Status: DISPENSED
Start: 2025-05-22